# Patient Record
Sex: MALE | Race: WHITE | NOT HISPANIC OR LATINO | Employment: OTHER | URBAN - METROPOLITAN AREA
[De-identification: names, ages, dates, MRNs, and addresses within clinical notes are randomized per-mention and may not be internally consistent; named-entity substitution may affect disease eponyms.]

---

## 2019-12-19 ENCOUNTER — TRANSFERRED RECORDS (OUTPATIENT)
Dept: HEALTH INFORMATION MANAGEMENT | Facility: CLINIC | Age: 69
End: 2019-12-19

## 2021-03-08 ENCOUNTER — TRANSFERRED RECORDS (OUTPATIENT)
Dept: HEALTH INFORMATION MANAGEMENT | Facility: CLINIC | Age: 71
End: 2021-03-08

## 2021-03-09 ENCOUNTER — TRANSFERRED RECORDS (OUTPATIENT)
Dept: HEALTH INFORMATION MANAGEMENT | Facility: CLINIC | Age: 71
End: 2021-03-09

## 2021-03-10 ENCOUNTER — REFERRAL (OUTPATIENT)
Dept: TRANSPLANT | Facility: CLINIC | Age: 71
End: 2021-03-10

## 2021-03-10 DIAGNOSIS — N18.6 ESRD (END STAGE RENAL DISEASE) (H): Primary | ICD-10-CM

## 2021-03-10 DIAGNOSIS — N05.9 GLOMERULONEPHRITIS: ICD-10-CM

## 2021-03-10 NOTE — LETTER
March 23, 2021      Cesar Rashid  [address] [place] [address]        Dear Cesar,    Thank you for your interest in the Transplant Center at [facility]. We look forward to being a part of your care team and assisting you through the transplant process.    As we discussed, your transplant coordinator is Brenda Hernandez (Kidney).  You may call your coordinator at any time with questions or concerns.  Your first scheduled call will be on March 29, 2021.  If this needs to change, call [phone].    Please complete the following.    1. Fill out and return the enclosed forms    Authorization for Electronic Communication    Authorization to Discuss Protected Health Information    Authorization for Release of Protected Health Information    Authorization for Care Everywhere Release of Information    2. Sign up for:    [employer], access to your electronic medical record (see enclosed pamphlet)    [employer], a transplant education website    You can use these tools to learn more about your transplant, communicate with your care team, and track your medical details    Sincerely,    Solid Organ Transplant  [facility]  Cc: Loreta Valdivia MD (Referring)

## 2021-03-10 NOTE — LETTER
Cesar Rashid  [address] [facility] [address]                March 11, 2021                                                                                        MEDICAL RECORDS REQUEST    [facility] Kidney, Kidney Pancreas Transplant Program Records Request                      Facility: [facility]    Thank you for referring your patient to the [facility] Kidney, Kidney Pancreas Program, in order to process the referral we will need the following information;    1. Demographics  2. 2728 form   3. Immunizations records  4. Providers Progress notes, (last 3 note)      Please call our office at [phone] if you have any questions or concerns.                Please fax all paper records to [phone] within 1-3 business days.      Thank you,   The SOT Referral Intake Team     [facility]  Solid Organ Transplant Office  [address] [place] [address], [address] [address]

## 2021-03-10 NOTE — LETTER
Cesar Rashid  [address] [facility] [address]                March 23, 2021       Date:     To:      Fax:     Re: Misdirected Fax     Greetings,     Our intake team has received a fax regarding *** from your facility. To better assist you, please resend fax to the appropriate facility/department.   This is a referral fax number for new adult Solid Organ Transplant patients only.      If patient is needing transplant services, send a referral for transplant, and we will reach out to the patient in 1-2 business days.     Please call our office at [phone] if you have any questions or concerns.      Sincerely,     [facility]  Solid Organ Transplant Office  [facility], [address] [address]

## 2021-03-11 VITALS — WEIGHT: 159 LBS | HEIGHT: 68 IN | BODY MASS INDEX: 24.1 KG/M2

## 2021-03-11 SDOH — HEALTH STABILITY: MENTAL HEALTH: HOW OFTEN DO YOU HAVE 6 OR MORE DRINKS ON ONE OCCASION?: NOT ASKED

## 2021-03-11 SDOH — HEALTH STABILITY: MENTAL HEALTH: HOW MANY STANDARD DRINKS CONTAINING ALCOHOL DO YOU HAVE ON A TYPICAL DAY?: NOT ASKED

## 2021-03-11 SDOH — HEALTH STABILITY: MENTAL HEALTH: HOW OFTEN DO YOU HAVE A DRINK CONTAINING ALCOHOL?: NOT ASKED

## 2021-03-11 ASSESSMENT — MIFFLIN-ST. JEOR: SCORE: 1460.72

## 2021-03-11 NOTE — TELEPHONE ENCOUNTER
PCP: Olga Lazcano NP   Referring Provider: Loreta Valdivia MD  Referring Diagnosis: ESRD  Glomeruonephritis    Is patient under the age of 65? N  Is patient diabetic?   Is patient on insulin?   Was patient offered a pancreas transplant referral? N    Is patient in a group home/assisted living? N  Does patient have a guardian? N    Referral intake process completed.  Patient is aware that after financial approval is received, medical records will be requested.   Patient confirmed for a callback from transplant coordinator on March 29, 2021. (within 2 weeks)  Tentative evaluation date April 14, 2021. (within 4 weeks)    Confirmed coordinator will discuss evaluation process in more detail at the time of their call.   Patient is aware of the need to arrange age appropriate cancer screening, vaccinations, and dental care.  Reminded patient to complete questionnaire, complete medical records release, and review packet prior to evaluation visit .  Assessed patient for special needs (ie--wheelchair, assistance, guardian, and ):  none   Patient instructed to call [phone] with questions.     Patient gave verbal consent during intake call to obtain medical records and documents outside of [facility]:  yes

## 2021-03-31 ENCOUNTER — COMMITTEE REVIEW (OUTPATIENT)
Dept: TRANSPLANT | Facility: CLINIC | Age: 71
End: 2021-03-31

## 2021-03-31 NOTE — COMMITTEE REVIEW
Abdominal Patient Discussion Note Transplant Coordinator: Brenda Hernandez  Transplant Surgeon:       Referring Physician: Loreta Valdivia    Committee Review Members:  Nephrology Elgin Perez MD, Tracee Nixon MD, Sarbjit Irvin MD   Nurse Franchesca Owusu RN   Pharmacy Edgardo Herron [facility]    - Clinical Mireyadebby Angel, MSW   Transplant Radha Neal RN, Brenda Hernandez, RN, Roshni Valadez, RN, Katy Goldsmith RN, Tamera Salguero MD, Giulai Garcia, BIJU, Susie Romero RN   Transplant Surgery Ashutosh Calero MD, Tamera Salguero MD       Additional Discussion Notes and Findings: Reviewed pt's recent diagnosis of low-grade noninvasive urothelial carcinoma and treatment plan.  Pt was diagnosed on 2/27/20 and has been following w/ urology since.  He has been undergoing cystoscopies q3 months.  Dr. Salguero felt it appropriate to begin his pre-kidney transplant evaluation at this time as he is already one year out from his initial diagnosis.  Will contact pt w/ outcome.

## 2021-04-01 NOTE — TELEPHONE ENCOUNTER
Called pt and let him know we reviewed his bladder cancer history at [facility] on 3/31/21 and the team thought it would be appropriate for him to proceed w/ transplant evaluation. He is not sure if he is totally on board w/ transplant, he states he feels really good and his labs continue to show improvements in his kidney function.  He told me he is having more labs done early next week and he wants to think it about it more and he will call me back next week.  He has my direct line and had no further questions for me.

## 2021-04-03 ENCOUNTER — HEALTH MAINTENANCE LETTER (OUTPATIENT)
Age: 71
End: 2021-04-03

## 2021-04-12 ENCOUNTER — TELEPHONE (OUTPATIENT)
Dept: TRANSPLANT | Facility: CLINIC | Age: 71
End: 2021-04-12

## 2021-04-12 NOTE — TELEPHONE ENCOUNTER
Called pt to let him know we have a tentative evaluation date of 4/14/21, he confirmed he would like to cancel as he still wants to think more about pursuing transplant.  He has my direct line and will call me when he has a decision about whether or not he wishes to proceed w/ eval. Will let scheduling know.

## 2021-09-09 ENCOUNTER — TELEPHONE (OUTPATIENT)
Dept: TRANSPLANT | Facility: CLINIC | Age: 71
End: 2021-09-09

## 2021-09-09 NOTE — TELEPHONE ENCOUNTER
Left pt a VM stating that we have not heard from him since April and since his insurance auth for eval is now expiring we will be closing his referral.  Stated he may be referred again if the future by his nephrologist if he wishes to be evaluated.  Provided my direct line for follow up.

## 2021-09-18 ENCOUNTER — HEALTH MAINTENANCE LETTER (OUTPATIENT)
Age: 71
End: 2021-09-18

## 2021-12-29 ENCOUNTER — TELEPHONE (OUTPATIENT)
Dept: TRANSPLANT | Facility: CLINIC | Age: 71
End: 2021-12-29
Payer: COMMERCIAL

## 2021-12-29 NOTE — TELEPHONE ENCOUNTER
Pt called and told me he was unsure about pursuing transplant when he was initially referred back in March 2021, however, he has changed his mind and would like to proceed w/ evaluation for kidney transplant.  I explained to him that we will have intake reach out and get insurance approval per our normal process and that once those two pieces are completed, he will hear from me and will have an evaluation arranged. He had no further questions and was in good agreement w/ the plan. Will send chart to intake.

## 2021-12-30 ENCOUNTER — REFERRAL (OUTPATIENT)
Dept: TRANSPLANT | Facility: CLINIC | Age: 71
End: 2021-12-30
Payer: COMMERCIAL

## 2021-12-30 DIAGNOSIS — Z76.82 ORGAN TRANSPLANT CANDIDATE: ICD-10-CM

## 2021-12-30 DIAGNOSIS — N18.6 ESRD (END STAGE RENAL DISEASE) (H): Primary | ICD-10-CM

## 2021-12-30 DIAGNOSIS — Z01.818 PRE-TRANSPLANT EVALUATION FOR KIDNEY TRANSPLANT: ICD-10-CM

## 2021-12-30 DIAGNOSIS — I10 ESSENTIAL HYPERTENSION: ICD-10-CM

## 2021-12-30 DIAGNOSIS — N18.6 END STAGE RENAL DISEASE (H): ICD-10-CM

## 2021-12-30 NOTE — LETTER
January 3, 2022                                                                       MEDICAL RECORDS REQUEST    [facility] Kidney, Kidney Pancreas Transplant Program Records Request                      Facility: [facility]   Rob Rashid   1950    Thank you for referring your patient to the [facility] Kidney, Kidney Pancreas Program, in order to process the referral we will need the following information;    1. Immunizations records  2. Providers Progress notes, (last 3 note)      Please call our office at [phone] if you have any questions or concerns.                Please fax all paper records to [phone] within 3-5 business days.      Thank you,   The SOT Referral Intake Team     [facility]  Solid Organ Transplant Office  [address] [place] [address], [address] [address]

## 2021-12-30 NOTE — LETTER
January 31, 2022    Cesar Rashid  [address] [place] [address]    Dear Cesar,    Thank you for your interest in the Transplant Center at [facility]. We look forward to being a part of your care team and assisting you through the transplant process.    As we discussed, your transplant coordinator is Brenda Hernandez (Kidney).  You may call your coordinator at any time with questions or concerns.  [phone].    Please complete the following.    1. Fill out and return the enclosed forms    Authorization for Electronic Communication    Authorization to Discuss Protected Health Information    Authorization for Release of Protected Health Information    Authorization for Care Everywhere Release of Information    2. Sign up for:    [employer], access to your electronic medical record (see enclosed pamphlet)    [email or web address], a transplant education website    You can use these tools to learn more about your transplant, communicate with your care team, and track your medical details      Sincerely,      Solid Organ Transplant  [facility]    cc: Referring Physician PCP

## 2022-01-03 VITALS — HEIGHT: 68 IN | WEIGHT: 163 LBS | BODY MASS INDEX: 24.71 KG/M2

## 2022-01-03 ASSESSMENT — MIFFLIN-ST. JEOR: SCORE: 1473.86

## 2022-01-03 NOTE — TELEPHONE ENCOUNTER
PCP: Olga Lazcano NP  Referring Provider: Loreta Valdivia MD  Referring Diagnosis: ESRD    Is patient under the age of 65? No  Is patient diabetic? no  Is patient on insulin?   Was patient offered a pancreas transplant referral? no    Is patient in a group home/assisted living? no  Does patient have a guardian? no    Referral intake process completed.  Patient is aware that after financial approval is received, medical records will be requested.   Patient confirmed for a callback from transplant coordinator on Jan. 18, 2022. (within 2 weeks)  Tentative evaluation date Feb. 2, 2022. (within 4 weeks)    Confirmed coordinator will discuss evaluation process in more detail at the time of their call.   Patient is aware of the need to arrange age appropriate cancer screening, vaccinations, and dental care.  Reminded patient to complete questionnaire, complete medical records release, and review packet prior to evaluation visit .  Assessed patient for special needs (ie-wheelchair, assistance, guardian, and ):  none   Patient instructed to call [phone] with questions.     Patient gave verbal consent during intake call to obtain medical records and documents outside of [facility]:  yes

## 2022-01-18 NOTE — TELEPHONE ENCOUNTER
Contacted patient and introduced myself as their Transplant Coordinator, also introduced the role of the Transplant Coordinator in the transplant process.  Explained the purpose of this call including reviewing next steps and answering questions.    Confirmed Referring Provider, Dialysis Center, and Primary Care Physician. Notified patient of the importance of continued communication with referring providers and primary care physicians.    Reviewed components of transplant evaluation process including necessary appointments, tests, and procedures.    Answered questions for patient regarding evaluation, provided my name and contact information and requested they call with any additional questions.    Determined that patient would like additional information regarding transplant by:     Drop Down choices: Mail, Email, MyChart, Phone Call   Encourage MyChart   Notified patients that they will hear from a Transplant  to schedule evaluation.     Reviewed pt's chart for pre-kidney transplant evaluation planning. Pt lives in [place], [place]. Pt has ESKD d/t autoimmune glomerulonephritis, on PD and follows w/ Dr. Loreta Valdivia. Other hx includes anemia, HTN, hypothyroid, GERD, chronic frontal sinusitis (working w/ local ENT), and urothelial carcinoma (follows w/ local urologist- due for follow up).  Heart hx includes angina.  No significant lung issues. BMI 23.  Colonoscopy: not UTD.  Dental: not UTD.  Pt is not a smoker, and denies alcohol and recreational drug abuse. Pt is independent w/ ADLs.  Pt lives w/ wife.  Pt has been vaccinated x3 against COVID-19.     I also introduced [employer] and asked pt to create an account and view pre-kidney transplant videos for review with me following evaluation. Will cancel STD since [place] out of state and will need to be seen in person. Informed pt they will hear from scheduling to arrange the evaluation. Smartset orders entered, chart routed to scheduling pool.

## 2022-02-27 ENCOUNTER — TRANSFERRED RECORDS (OUTPATIENT)
Dept: HEALTH INFORMATION MANAGEMENT | Facility: CLINIC | Age: 72
End: 2022-02-27
Payer: COMMERCIAL

## 2022-03-14 ENCOUNTER — TELEPHONE (OUTPATIENT)
Dept: TRANSPLANT | Facility: CLINIC | Age: 72
End: 2022-03-14
Payer: COMMERCIAL

## 2022-03-14 NOTE — TELEPHONE ENCOUNTER
Received call from pt, he stated that he is no longer interested in being evaluated for kidney transplant.  He has reviewed the pt education and feels that at his age and based on the fact that he is tolerating dialysis well, he does not wish to take on the risks of transplant.  I did mention that he could keep his PKE slot if he wanted to meet with the transplant providers and discuss further with them, he declined, but we did review that if he has a change of heart in the future he is always welcome to be referred back.  He states he feels well overall and since dialysis is going well he wants to maintain his current quality of life.  Will end referral episode and notify scheduling to cancel his PKE STD.

## 2022-04-30 ENCOUNTER — HEALTH MAINTENANCE LETTER (OUTPATIENT)
Age: 72
End: 2022-04-30

## 2022-11-19 ENCOUNTER — HEALTH MAINTENANCE LETTER (OUTPATIENT)
Age: 72
End: 2022-11-19

## 2023-03-24 ENCOUNTER — TRANSFERRED RECORDS (OUTPATIENT)
Dept: HEALTH INFORMATION MANAGEMENT | Facility: CLINIC | Age: 73
End: 2023-03-24

## 2023-06-01 ENCOUNTER — HEALTH MAINTENANCE LETTER (OUTPATIENT)
Age: 73
End: 2023-06-01

## 2024-02-06 ENCOUNTER — TRANSFERRED RECORDS (OUTPATIENT)
Dept: HEALTH INFORMATION MANAGEMENT | Facility: CLINIC | Age: 74
End: 2024-02-06

## 2024-04-02 ENCOUNTER — TRANSFERRED RECORDS (OUTPATIENT)
Dept: HEALTH INFORMATION MANAGEMENT | Facility: CLINIC | Age: 74
End: 2024-04-02

## 2024-04-05 ENCOUNTER — TRANSFERRED RECORDS (OUTPATIENT)
Dept: HEALTH INFORMATION MANAGEMENT | Facility: CLINIC | Age: 74
End: 2024-04-05

## 2024-05-07 ENCOUNTER — REFERRAL (OUTPATIENT)
Dept: TRANSPLANT | Facility: CLINIC | Age: 74
End: 2024-05-07
Payer: COMMERCIAL

## 2024-05-07 DIAGNOSIS — C67.2 MALIGNANT NEOPLASM OF LATERAL WALL OF URINARY BLADDER (H): ICD-10-CM

## 2024-05-07 DIAGNOSIS — D63.1 ANEMIA DUE TO CHRONIC KIDNEY DISEASE: ICD-10-CM

## 2024-05-07 DIAGNOSIS — N18.6 ESRD ON PERITONEAL DIALYSIS (H): Primary | ICD-10-CM

## 2024-05-07 DIAGNOSIS — Z01.818 PRE-TRANSPLANT EVALUATION FOR KIDNEY TRANSPLANT: ICD-10-CM

## 2024-05-07 DIAGNOSIS — Z99.2 ESRD ON PERITONEAL DIALYSIS (H): Primary | ICD-10-CM

## 2024-05-07 DIAGNOSIS — N18.9 ANEMIA DUE TO CHRONIC KIDNEY DISEASE: ICD-10-CM

## 2024-05-07 DIAGNOSIS — Z96.641 STATUS POST RIGHT HIP REPLACEMENT: ICD-10-CM

## 2024-05-07 DIAGNOSIS — N18.6 ESRD (END STAGE RENAL DISEASE) (H): ICD-10-CM

## 2024-05-07 DIAGNOSIS — Z86.16 HISTORY OF COVID-19: ICD-10-CM

## 2024-05-07 NOTE — LETTER
Cesar Rashid  [address] [place] [id number]          Dear Cesar,    Thank you for your interest in the Transplant Center at [facility]. We look forward to being a part of your care team and assisting you through the transplant process.    As we discussed, your transplant coordinator is Nalini Vargas (Kidney).  You may call your coordinator at any time with questions or concerns.  Your first scheduled call will be on 5/13/2024 between 1-4pm.  If this needs to change, call [phone].    Please complete the following.    Fill out and return the enclosed forms  Authorization for Electronic Communication  Authorization to Discuss Protected Health Information  Authorization for Release of Protected Health Information    Sign up for:  [employer], access to your electronic medical record (see enclosed pamphlet)  [employer], a transplant education website    You can use these tools to learn more about your transplant, communicate with your care team, and track your medical details      Sincerely,      Solid Organ Transplant  [facility]    cc: Referring Physician Dialysis Unit PCP

## 2024-05-07 NOTE — LETTER
Cesar Schuler Rashid  [address] [facility] [address]                May 10, 2024                                                                                        MEDICAL RECORDS REQUEST    [facility] Kidney, Kidney Pancreas Transplant Program Records Request                      Facility: [facility] ([facility])    Thank you for referring your patient to the [facility] Kidney, Kidney Pancreas Program, in order to process the referral we will need the following information;    Demographics  2728 form                 Please call our office at [phone] if you have any questions or concerns.                Please fax all paper records to [phone] within 3-5 business days.      Thank you,   The SOT Referral Intake Team     [facility]  Solid Organ Transplant Office  [address] [place] [address] [address]  [place] [address]

## 2024-05-09 ENCOUNTER — TELEPHONE (OUTPATIENT)
Dept: TRANSPLANT | Facility: CLINIC | Age: 74
End: 2024-05-09
Payer: COMMERCIAL

## 2024-05-09 NOTE — TELEPHONE ENCOUNTER
Patient Call: General  Route to LPN    Reason for call: wife and patient returning call back to intake team.     Call back needed? Yes    Return Call Needed  Same as documented in contacts section  When to return call?: Same day: Route High Priority

## 2024-05-10 VITALS — BODY MASS INDEX: 25.01 KG/M2 | WEIGHT: 165 LBS | HEIGHT: 68 IN

## 2024-05-10 PROBLEM — N18.6 ESRD ON PERITONEAL DIALYSIS (H): Status: ACTIVE | Noted: 2021-06-01

## 2024-05-10 PROBLEM — Z99.2 ESRD ON PERITONEAL DIALYSIS (H): Status: ACTIVE | Noted: 2021-06-01

## 2024-05-10 NOTE — TELEPHONE ENCOUNTER
SOT KIDNEY INTAKE   May 10, 2024 3:58 PM - Jessica Schmitz LPN:     PCP: Steffi Hatch    Referring Organization: [facility]  Referring Provider: Loreta Valdivia MD   Referring Diagnosis: ESRD on PD    GFR/Date: On dialysis     Is patient under the age of 65? No   Is patient diabetic? No  Is patient on insulin? No  Was patient offered a pancreas transplant referral? No    Previous transplants: No  Cancer history: Bladder mass 12/31/2019  Cardiac history: VF, Ischemic cardiomyopathy, STEMI , ICD 9/06/2023  Biopsy: Left renal biopsy 1/2020  Oxygen use at rest: 0 with activity: 0    BMI: 25.09 (BMI> 40 - RNCC call only/ no PKE date)    Is patient in a group home/assisted living? No  Does patient have a guardian? No    Referral intake process completed.  Patient is aware that after financial approval is received, medical records will be requested.   Patient confirmed for a callback from transplant coordinator on 5/13/2024. (within 2 weeks)  Tentative evaluation date 7/29/2024 slot 5 (within 4 weeks) if appointment is virtual, does patient have capabilities of setting this up? No    Confirmed coordinator will discuss evaluation process in more detail at the time of their call.   Patient is aware of the need to arrange age appropriate cancer screening, vaccinations, and dental care.  Reminded patient to complete questionnaire, complete medical records release, and review packet prior to evaluation visit .  Assessed patient for special needs (ie-wheelchair, assistance, guardian, and ):  None    Patient instructed to call [phone] with questions.     Patient gave verbal consent during intake call to obtain medical records and documents outside of [facility]:  Yes

## 2024-05-13 PROBLEM — N17.9 ACUTE RENAL FAILURE (H): Status: ACTIVE | Noted: 2019-12-20

## 2024-05-13 PROBLEM — I25.5 ISCHEMIC CARDIOMYOPATHY: Status: ACTIVE | Noted: 2023-09-06

## 2024-05-13 PROBLEM — E78.5 DYSLIPIDEMIA: Status: ACTIVE | Noted: 2023-09-07

## 2024-05-13 PROBLEM — Z95.5 STENTED CORONARY ARTERY: Status: ACTIVE | Noted: 2023-09-06

## 2024-05-13 PROBLEM — Z86.16 HISTORY OF COVID-19: Status: ACTIVE | Noted: 2022-12-16

## 2024-05-13 PROBLEM — I21.19 ACUTE ST ELEVATION MYOCARDIAL INFARCTION (STEMI) OF INFERIOR WALL (H): Status: ACTIVE | Noted: 2023-09-06

## 2024-05-13 PROBLEM — I25.10 ATHEROSCLEROSIS OF CORONARY ARTERY: Status: ACTIVE | Noted: 2023-10-10

## 2024-05-13 PROBLEM — D63.1 ANEMIA DUE TO CHRONIC KIDNEY DISEASE: Status: ACTIVE | Noted: 2020-07-19

## 2024-05-13 PROBLEM — C67.2 MALIGNANT NEOPLASM OF LATERAL WALL OF URINARY BLADDER (H): Status: ACTIVE | Noted: 2020-02-27

## 2024-05-13 PROBLEM — I49.01 VF (VENTRICULAR FIBRILLATION) (H): Status: ACTIVE | Noted: 2023-09-06

## 2024-05-13 PROBLEM — N18.9 ANEMIA DUE TO CHRONIC KIDNEY DISEASE: Status: ACTIVE | Noted: 2020-07-19

## 2024-05-13 PROBLEM — N32.89 BLADDER MASS: Status: ACTIVE | Noted: 2019-12-31

## 2024-05-13 PROBLEM — R91.1 PULMONARY NODULE: Status: ACTIVE | Noted: 2019-12-20

## 2024-05-13 PROBLEM — R00.1 BRADYCARDIA, SINUS: Status: ACTIVE | Noted: 2023-09-08

## 2024-05-13 PROBLEM — N18.6 ESRD (END STAGE RENAL DISEASE) (H): Status: ACTIVE | Noted: 2021-06-01

## 2024-05-13 RX ORDER — CARVEDILOL 12.5 MG/1
12.5 TABLET ORAL 2 TIMES DAILY WITH MEALS
COMMUNITY

## 2024-05-13 RX ORDER — ASPIRIN 81 MG/1
81 TABLET, CHEWABLE ORAL DAILY
COMMUNITY
Start: 2023-09-12

## 2024-05-13 RX ORDER — LISINOPRIL 20 MG/1
20 TABLET ORAL DAILY
COMMUNITY
Start: 2023-09-12

## 2024-05-13 RX ORDER — CARVEDILOL 6.25 MG/1
6.25 TABLET ORAL 2 TIMES DAILY WITH MEALS
COMMUNITY
Start: 2024-01-03 | End: 2025-01-02

## 2024-05-13 RX ORDER — LEVOTHYROXINE SODIUM 25 UG/1
1 TABLET ORAL DAILY
COMMUNITY
Start: 2023-11-24

## 2024-05-13 RX ORDER — LEVOTHYROXINE SODIUM 200 UG/1
1 TABLET ORAL DAILY
COMMUNITY
Start: 2023-04-04

## 2024-05-13 NOTE — TELEPHONE ENCOUNTER
Reviewed patients chart for pre-Kidney transplant evaluation planning.      services is required NO    Is pt able to attend virtual education class? YES: 7/22/24      Pt has bladder ca in 2020, biopsy unknown.  Pt is on dialysis, PD started on 7/22/20. Pt is not diabetic, not insulin.     Health hx: Low grade bladder cancer 3/2020, last cytology  Heart hx: heart attack with stent.  Lung hx: n/a  Surgical hx: hips, eye, chest port, PD cath    Pt has never smoked, does not consume alcohol, and does not do recreational drugs. Does not have current infection, does not have non-healing wounds, or active cancer.    Health maintenance items: BMI 25 on 5/10/24.  Colonoscopy: pending.  Dental: pending. Vaccinations: pending. Pt is independent w/ ADLs.  Pt lives in [place] w/ spouse.  Has support following transplant. Pt does not have living donors at this time.     Imaging Available: none, need CT iliacs after eval    Contacted patient and introduced myself as their Transplant Coordinator, also introduced the role of the Transplant Coordinator in the transplant process.  Explained the purpose of this call including reviewing next steps and answering questions.      Confirmed Referring Provider; Dialysis Center, and Primary Care Physician. Explained to the patient of the importance of continued communication with referring providers and primary care physicians.      Reviewed components of transplant evaluation process including necessary appointments, tests, and procedures.  Instructed pt to bring 1-2 people with them to eval and to eat and drink and normally on eval day    Answered questions for patient regarding evaluation, provided my name and contact information and requested they call/message with any additional questions or concerns.  Informed patient they will receive a letter with information discussed in referral call. Determined that patient would like information regarding transplant by:        Lynette Rivas signed up for education class prior to evaluation. Pt expressed good understanding of all and were in  agreement with the plan.    Confirmed STD 7/29/24. Smartset orders entered.

## 2024-06-16 ENCOUNTER — HEALTH MAINTENANCE LETTER (OUTPATIENT)
Age: 74
End: 2024-06-16

## 2024-06-28 ENCOUNTER — TELEPHONE (OUTPATIENT)
Dept: TRANSPLANT | Facility: CLINIC | Age: 74
End: 2024-06-28
Payer: COMMERCIAL

## 2024-06-28 NOTE — TELEPHONE ENCOUNTER
Spoke with Rafaela to let her know we are resending the welcome packet with enclosed forms for patient to sign. She had no further questions and will sign and return forms.

## 2024-07-22 ENCOUNTER — VIRTUAL VISIT (OUTPATIENT)
Dept: TRANSPLANT | Facility: CLINIC | Age: 74
End: 2024-07-22
Attending: NURSE PRACTITIONER
Payer: COMMERCIAL

## 2024-07-22 ENCOUNTER — TELEPHONE (OUTPATIENT)
Dept: TRANSPLANT | Facility: CLINIC | Age: 74
End: 2024-07-22
Payer: COMMERCIAL

## 2024-07-22 DIAGNOSIS — Z99.2 ESRD ON PERITONEAL DIALYSIS (H): ICD-10-CM

## 2024-07-22 DIAGNOSIS — Z01.818 PRE-TRANSPLANT EVALUATION FOR KIDNEY TRANSPLANT: ICD-10-CM

## 2024-07-22 DIAGNOSIS — N18.6 ESRD ON PERITONEAL DIALYSIS (H): ICD-10-CM

## 2024-07-22 NOTE — TELEPHONE ENCOUNTER
Spoke with patient and his wife to reschedule the virtual transplant class as they had difficulties logging on this morning. Rescheduled to 0930 on 7/25/24. Patient will log on a few minutes before 0930.

## 2024-07-22 NOTE — TELEPHONE ENCOUNTER
Pt's wife called stated they missed the Transplant class this morning and wonder what to do next or how to get another one scheduled?

## 2024-07-25 ENCOUNTER — VIRTUAL VISIT (OUTPATIENT)
Dept: TRANSPLANT | Facility: CLINIC | Age: 74
End: 2024-07-25
Payer: COMMERCIAL

## 2024-07-25 DIAGNOSIS — Z76.82 ORGAN TRANSPLANT CANDIDATE: Primary | ICD-10-CM

## 2024-07-25 NOTE — GROUP NOTE
Date: 7/25/2024    Scheduled Start Time:  9:30 AM   Scheduled End Time: 10:30 AM    Department: [facility] Transplant Clinic    Facilitator(s): Kim Mahoney RN    Documentation:  Solid Organ Transplant Education      Patient attended the pre-transplant patient education class today. The [facility] website pre-transplant modules were viewed:     Content reviewed:  Living Donation and how to access that program  Paired exchange  Kidney Donor Profile Index (KDPI)  Waiting list issues (right to decline without penalty, high PHS risk donors, what to expect when called with an offer)  Hospital experience, length of stay, need to stay locally post-discharge (2-4 weeks)    Surgical complications with video                      Post-surgery lifting and driving restrictions  Post-transplant routines, frequency of lab work and clinic visits  Role of Transplant Coordinator    Participants were informed of the benefits of transplant as well as potential risks such as infection, cancer, and death. The need for total adherence with immunosuppression medications and following transplant regimens was stressed.  The overall evaluation/approval/listing process was reviewed.          Patient:   Cesar Rashid    Transplant Episode(s):  Kidney Transplant Referral - 5/7/2024    Patient attended class with his wife and was very engaged and asked good questions.

## 2024-07-25 NOTE — PROGRESS NOTES
[facility] SOLID ORGAN TRANSPLANT  OUTPATIENT MNT: KIDNEY TRANSPLANT EVALUATION    Current BMI: 24.7 (HT 69.5 in,  lbs/77 kg)  BMI guideline for kidney transplant up to a BMI of 40 / per surgeon discretion     Frailty Assessment-- Not Frail (2/5 points)- low activity, low   Handgrip Strength: 16    Reference:  Score of 0-2 = Not Frail  Score of 3-5 = Frail     *Pt would need to stop several supplements (see below) at time of transplant      TIME SPENT: 15 minutes  VISIT TYPE: Initial   REFERRING PHYSICIAN: Lester  PT ACCOMPANIED BY: his wife     History of previous txp: none   Dialysis: PD 7/2020    NUTRITION ASSESSMENT  - Meal prep & grocery shopping: pt's wife   - Previous RD education: yes  - Appetite: variable, slightly better over the years  - Food allergies/intolerances: no  - Issues chewing or swallowing: no  - N/V/D/C: no  - Food access concerns: not asked     *Has been taking the following supplements for several years  Vitamins, Supplements, Pertinent Meds: omega 3, CoQ10, renagel (takes most of the time)     Herbal Medicines/Supplements: all of the below would need to be stopped at time of transplant  - [employer] Cardiogenics Intensive Care  - Arterosil Endothelial Glycocalyx Support  - NutriDyn Chondro Relief  - [facility] Liver Care  - NutriDyn Essential Multivitamin- contains many herbals     Protein Supplements: protein drink from [employer]- 1/2 per day with meds     Weight hx // fluid retention:   - no MIKE  - weight stable     PHYSICAL ACTIVITY   Works around the house, [employer], cuts wood   Energy level OK, can walk 1 block      DIET RECALL  Breakfast Late AM- pastry with coffee or none due to lack of appetite    Lunch Hamburger, brat   Dinner Taco salad; brat & beans; carrot juice    Snacks Occasional potato chips   Beverages Water, some coffee, occasional iced tea   Alcohol Seldom- if out for pizza 2x/year    Dining out A few times/week      LABS  Per pt, K/Phos slightly high  the past few months, no recent results on file      NUTRITION DIAGNOSIS   No nutrition diagnosis identified at this time     NUTRITION INTERVENTION   Nutrition education provided:  Discussed sodium intake (low sodium foods and drinks, seasoning food without salt and tips for low sodium diet).  Reviewed K/Phos, protein needs with being on dialysis.     Reviewed post txp diet guidelines in brief (will review in further detail post txp):  (1) Review of proper food safety measures d/t immunosuppressant therapy post-op and increased risk for food-borne illness    (2) Avoid the following post txp d/t risk for rejection, unknown effects on the organs, and/or potential interactions with immunosuppressants:  - Herbal, Chinese, holistic, chiropractic, natural, alternative medicines and supplements  - Detoxes and cleanses  - Weight loss pills  - Protein powders or other products with extracts or herbs (ie green tea extract)    (3) Med regimen and possible side effects    Patient Understanding: Pt verbalized understanding of education provided.  Expected Engagement: Good  Follow-Up Plans: PRN     NUTRITION GOALS   No nutrition goals identified at this time     Zoila Thomas, RD, LD, CCTD

## 2024-07-27 NOTE — PROGRESS NOTES
TRANSPLANT NEPHROLOGY RECIPIENT EVALUATION NOTE    Recommendations:  - Visit with Dr. Stoddard for input on ANCA disease quiescence.   - cardiac risk assessment   - CT a/p/c and iliacs  - Hindu: unknown to this writer at time of evaluation. Will need to confirm willingness for blood transfusions/ cell saver.   -urology/ updated cysto  - colonoscopy   - repeat lupus anticoagulant with SPEP/ UPEP and beta-2 glycoproteins,  per lab recs.     Assessment and Plan:  # Kidney Transplant Evaluation: Patient is a fair candidate overall. Benefits of a living donor transplant were discussed.    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 Refused a kidney biopsy. HD initiation 7/17/20 since changed to PD which is going well. May benefit from a kidney transplant. Repeat MPO titer today decreased/stable ~ 58. His course has also been  c/b  chronic frontal sinusitis. Recommend evaluation with Dr. Stoddard for input on disease quiescence.     # Cardiac Risk: H/o STEMI and VF cardiac arrest 9/6/23 s/p shockwave lithotripsy and SURESH to RCA and SURESH x1 to RPLA, ischemic cardiomyopathy with LVEF of 45%, on amiodarone for VT. During his hospital stay he had an in-hospital VT arrest. Repeat coronary angio was completed that showed no evidence of In Stent restenosis. S/p ICD placement. Follow-up echo 11/17/23 showed LVEF of 56% with mild hypokinesis of mid lateral wall, mild MR. Would need risk assessment,     # PAD Screening: scattered disease on 2019 CT. Refer to surgery for updated imaging.     # Chronic frontal sinusitis: followed by ENT, managing with sinus rinses and appears stable with last infection in 12/2023.     # Hindu: unknown to this writer at time of evaluation. Will need to confirm willingness for blood transfusions/ cell saver.     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     Path showed:   A.  Bladder, bladder tumor, transurethral resection:    Non-invasive low grade papillary urothelial carcinoma  Muscularis propria is present There was no evidence of muscle invasion.     Overdue for urology follow up, scheuduled for 8/8.     # Indeterminant 3 mm nodule right middle lobe:  most likely benign on 2019 CT along with a couple of tiny 3 mm subpleural nodules along the major fissures. Most likely benign. Recommend repeat CT.     # Indeterminate lupus anticoagulant: will repeat with SPEP/ UPEP and beta-2 glycoproteins per lab recs.     # Health Maintenance: Colonoscopy: Not up to date and Dental: should be UTD in the past 6-12 months.     - Discussed the risks and benefits of a transplant, including the risk of surgery and immunosuppression medications.  Patient's overall evaluation will be discussed in the Transplant Program's regular meeting with a final recommendation on the patients suitability for transplant to be made at that time.    Pending completion of the full evaluation, patient presently appears to be enough of an acceptable kidney transplant recipient candidate to have any potential kidney donors start the evaluation process.      Evaluation:  Cesar Rashid was seen in consultation at the request of Dr. Myranda Batista for evaluation as a potential kidney transplant recipient.    Reason for Visit:  Cesar Rashid is a 73 year old male with ESKD from ANCA vasculitis, who presents for kidney transplant evaluation.    History of Present Illness:           Kidney Disease Hx:         Presented in 12/2019 with LLUVIA (Cr ~7 mg/dl), microscopic hematuria (11-20 RBCs on UA) and a UPCR ~ 3.9 g/g. Serologies unremarkable for ISABEL -ve , C3/C4 N , SPEP , FLC  and Anti GBM, however, was positive for MPO ANCA  (level  ~ 70.5 CU) / PR3 negative. He refused a kidney biopsy / IS/Ritux at the time. He ultimately started hemodialysis on 7/17/2020. During that time he  was also diagnosed with bladder cancer s/p TURBT in 3/2020 and received BCG. His last cysto was negative in 8/2023. He has since changed to PD which is going well. His delay in coming in for a transplant evaluation was because he was unsure if he wanted a transplant.            Kidney Disease Dx: ANCA vasculitis       Biopsy Proven: No         On Dialysis: Yes, Date initiated: 7/2020 and Dialysis Type: Incenter HD; making a little urine,  BPs reportedly stable       Primary Nephrologist: Dr. Loreta Valdivia.        H/o Kidney Stones: Yes; 2 stones that he  passed on his own many years ago.        H/o Recurrent/Frequent UTI: No         Diabetic Hx: None           Cardiac Disease    H/o STEMI and VF cardiac arrest 9/6/23 s/p shockwave lithotripsy and SURESH to RCA and SURESH x1 to RPLA, ischemic cardiomyopathy with LVEF of 45%, on amiodarone for VT. During his hospital stay he had an in-hospital VT arrest. Repeat coronary angio was completed that showed no evidence of InStent restenosis. S/p ICD placement. Follow-up echo 11/17/23 showed LVEF of 56% with mild hypokinesis of mid lateral wall, mild MR.         Viral Serology Status       CMV IgG Antibody: Unknown       EBV IgG Antibody: Unknown         Volume Status/Weight:        Volume status: Euvolemic       Weight:  Acceptable BMI       BMI: There is no height or weight on file to calculate BMI.         Functional Capacity/Frailty:        Stays active maintaining his property,  mowing the lawn / cutting wood. Went to [place] last winter to vacation. Walks twice a day outside with his wife for 30 min, believes he could walk a mile. NO exertional or claudication symptoms.     Fatigue/Decreased Energy: [] No [x] Yes Sometimes tired   Chest Pain or SOB with Exertion: [x] No [] Yes    Significant Weight Change: [x] No [] Yes    Nausea, Vomiting or Diarrhea: [x] No [] Yes    Fever, Sweats or Chills:  [x] No [] Yes    Leg Swelling [x] No [] Yes        History of Cancer:   #  Urothelial carcinoma (3/2020): S/p TURBT and BCG, neg cysto last done in 8/2023    Path showed:   A.  Bladder, bladder tumor, transurethral resection:    Non-invasive low grade papillary urothelial carcinoma  Muscularis propria is present There was no evidence of muscle invasion.     Overdue for urology follow up, scheuduled  for 8/8.         Allergy Testing Questions:   Medication that caused a reaction  amlodipine -palpatations    Antibiotics used that didn't give an allergic reaction?  Patient doesn't know    COVID Vaccination Up To Date: Not asked    Potential Living Kidney Donors: Yes, maybe wife     Review of Systems:  A comprehensive review of systems was obtained and negative, except as noted in the HPI or PMH.    Past Medical History:   Medical record was reviewed and PMH was discussed with patient and noted below.  Past Medical History:   Diagnosis Date    Bladder cancer (H)        Past Social History:   Past Surgical History:   Procedure Laterality Date    repaired ruptured globe Right     TOTAL HIP ARTHROPLASTY Left 04/29/2014    TOTAL HIP ARTHROPLASTY Right 09/02/2014    VASCULAR SURGERY      chest port, peritoneal dialysis catheter     Personal history of bleeding or anesthesia problems: No    Family History:  Family History   Problem Relation Age of Onset    Colon Cancer Mother     Coronary Artery Disease Mother     Diabetes Mother     Coronary Artery Disease Father     Thyroid Disease Father     Alcoholism Brother     Diabetes Brother        Personal History:   Social History     Socioeconomic History    Marital status:      Spouse name: Not on file    Number of children: Not on file    Years of education: Not on file    Highest education level: Not on file   Occupational History    Not on file   Tobacco Use    Smoking status: Never    Smokeless tobacco: Never   Substance and Sexual Activity    Alcohol use: Not Currently     Comment: Very little - maybe 2 drinks a year    Drug use: Never     Sexual activity: Not on file   Other Topics Concern    Parent/sibling w/ CABG, MI or angioplasty before 65F 55M? No   Social History Narrative    Not on file     Social Determinants of Health     Financial Resource Strain: Not on file   Food Insecurity: No Food Insecurity (9/6/2023)    [facility]    Hunger Vital Sign     Worried About Running Out of Food in the Last Year: Never true     Ran Out of Food in the Last Year: Never true   Transportation Needs: Not on file   Physical Activity: Not on file   Stress: Not on file   Social Connections: Unknown (4/11/2023)    Received from [employer] [facility]    Social Connections     Frequency of Communication with Friends and Family: Not on file   Interpersonal Safety: Not on file   Housing Stability: Not on file       Allergies:  Allergies   Allergen Reactions    Levofloxacin Other (See Comments)    Omeprazole Nausea and Vomiting     Patient denied - no intolerance or allergy to this    Amlodipine Palpitations       Medications:  Current Outpatient Medications   Medication Sig Dispense Refill    aspirin (ASA) 81 MG chewable tablet Take 81 mg by mouth daily      carvedilol (COREG) 12.5 MG tablet Take 12.5 mg by mouth 2 times daily (with meals)      carvedilol (COREG) 6.25 MG tablet Take 6.25 mg by mouth 2 times daily (with meals)      levothyroxine (SYNTHROID/LEVOTHROID) 200 MCG tablet Take 1 tablet by mouth daily      levothyroxine (SYNTHROID/LEVOTHROID) 25 MCG tablet Take 1 tablet by mouth daily      lisinopril (ZESTRIL) 20 MG tablet Take 20 mg by mouth daily      Naltrexone HCl, Pain, 4.5 MG CAPS Take 4.5 mg by mouth daily       No current facility-administered medications for this visit.       Vitals:  There were no vitals taken for this visit.    Exam:  GENERAL APPEARANCE: alert and no distress  HENT: mouth without ulcers or lesions  RESP: lungs clear to auscultation - no rales, rhonchi or wheezes  CV: regular rhythm, normal  rate, no rub, no murmur  EDEMA: no LE edema bilaterally  ABDOMEN: soft, nondistended, nontender, bowel sounds normal  MS: extremities normal - no gross deformities noted, no evidence of inflammation in joints, no muscle tenderness  SKIN: no rash    Results:   No results found for this or any previous visit (from the past 336 hour(s)).    60 minutes spent on the date of the encounter doing chart review, history and exam, documentation and further activities per the note.

## 2024-07-28 LAB
A1 AG RBC QL: POSITIVE
ABO/RH(D): NORMAL
ABO/RH(D): NORMAL
ANTIBODY SCREEN: NEGATIVE
ANTIBODY TITER IGM SCREEN: NEGATIVE
B IGG TITR SERPL: 16 {TITER}
B IGM TITR SERPL: 8 {TITER}
SPECIMEN EXPIRATION DATE: NORMAL

## 2024-07-29 ENCOUNTER — ALLIED HEALTH/NURSE VISIT (OUTPATIENT)
Dept: TRANSPLANT | Facility: CLINIC | Age: 74
End: 2024-07-29
Attending: NURSE PRACTITIONER
Payer: COMMERCIAL

## 2024-07-29 ENCOUNTER — ANCILLARY PROCEDURE (OUTPATIENT)
Dept: CARDIOLOGY | Facility: CLINIC | Age: 74
End: 2024-07-29
Attending: NURSE PRACTITIONER
Payer: COMMERCIAL

## 2024-07-29 ENCOUNTER — LAB (OUTPATIENT)
Dept: LAB | Facility: CLINIC | Age: 74
End: 2024-07-29
Attending: NURSE PRACTITIONER
Payer: COMMERCIAL

## 2024-07-29 ENCOUNTER — DOCUMENTATION ONLY (OUTPATIENT)
Dept: TRANSPLANT | Facility: CLINIC | Age: 74
End: 2024-07-29

## 2024-07-29 ENCOUNTER — ANCILLARY PROCEDURE (OUTPATIENT)
Dept: GENERAL RADIOLOGY | Facility: CLINIC | Age: 74
End: 2024-07-29
Attending: NURSE PRACTITIONER
Payer: COMMERCIAL

## 2024-07-29 VITALS
BODY MASS INDEX: 24.35 KG/M2 | HEIGHT: 70 IN | WEIGHT: 170.1 LBS | DIASTOLIC BLOOD PRESSURE: 85 MMHG | OXYGEN SATURATION: 99 % | SYSTOLIC BLOOD PRESSURE: 151 MMHG | HEART RATE: 58 BPM

## 2024-07-29 DIAGNOSIS — Z76.82 ORGAN TRANSPLANT CANDIDATE: ICD-10-CM

## 2024-07-29 DIAGNOSIS — Z99.2 ESRD ON PERITONEAL DIALYSIS (H): ICD-10-CM

## 2024-07-29 DIAGNOSIS — N18.6 ESRD (END STAGE RENAL DISEASE) (H): ICD-10-CM

## 2024-07-29 DIAGNOSIS — Z76.82 ORGAN TRANSPLANT CANDIDATE: Primary | ICD-10-CM

## 2024-07-29 DIAGNOSIS — I77.82 ANCA-ASSOCIATED VASCULITIS (H): ICD-10-CM

## 2024-07-29 DIAGNOSIS — Z01.818 PRE-TRANSPLANT EVALUATION FOR KIDNEY TRANSPLANT: ICD-10-CM

## 2024-07-29 DIAGNOSIS — N18.6 ESRD ON PERITONEAL DIALYSIS (H): ICD-10-CM

## 2024-07-29 DIAGNOSIS — N18.6 ESRD ON PERITONEAL DIALYSIS (H): Primary | ICD-10-CM

## 2024-07-29 DIAGNOSIS — Z99.2 ESRD ON PERITONEAL DIALYSIS (H): Primary | ICD-10-CM

## 2024-07-29 DIAGNOSIS — N18.6 END STAGE RENAL DISEASE (H): ICD-10-CM

## 2024-07-29 DIAGNOSIS — Z12.5 ENCOUNTER FOR SCREENING FOR MALIGNANT NEOPLASM OF PROSTATE: ICD-10-CM

## 2024-07-29 DIAGNOSIS — Z01.818 PRE-TRANSPLANT EVALUATION FOR KIDNEY TRANSPLANT: Primary | ICD-10-CM

## 2024-07-29 DIAGNOSIS — N18.6 ESRD (END STAGE RENAL DISEASE) (H): Primary | ICD-10-CM

## 2024-07-29 LAB
ALBUMIN SERPL BCG-MCNC: 3.8 G/DL (ref 3.5–5.2)
ALP SERPL-CCNC: 82 U/L (ref 40–150)
ALT SERPL W P-5'-P-CCNC: 13 U/L (ref 0–70)
ANION GAP SERPL CALCULATED.3IONS-SCNC: 20 MMOL/L (ref 7–15)
APTT PPP: 29 SECONDS (ref 22–38)
AST SERPL W P-5'-P-CCNC: 13 U/L (ref 0–45)
BASOPHILS # BLD AUTO: 0.1 10E3/UL (ref 0–0.2)
BASOPHILS NFR BLD AUTO: 2 %
BILIRUB SERPL-MCNC: 0.3 MG/DL
BUN SERPL-MCNC: 53.8 MG/DL (ref 8–23)
CALCIUM SERPL-MCNC: 8.8 MG/DL (ref 8.8–10.4)
CHLORIDE SERPL-SCNC: 92 MMOL/L (ref 98–107)
CREAT SERPL-MCNC: 16.4 MG/DL (ref 0.67–1.17)
EGFRCR SERPLBLD CKD-EPI 2021: 3 ML/MIN/1.73M2
EOSINOPHIL # BLD AUTO: 0.4 10E3/UL (ref 0–0.7)
EOSINOPHIL NFR BLD AUTO: 7 %
ERYTHROCYTE [DISTWIDTH] IN BLOOD BY AUTOMATED COUNT: 15.1 % (ref 10–15)
FACTOR 2 INTERPRETATION: NORMAL
FACTOR V INTERPRETATION: NORMAL
GLUCOSE SERPL-MCNC: 135 MG/DL (ref 70–99)
HBV CORE AB SERPL QL IA: NONREACTIVE
HBV SURFACE AB SERPL IA-ACNC: <3.5 M[IU]/ML
HBV SURFACE AB SERPL IA-ACNC: NONREACTIVE M[IU]/ML
HBV SURFACE AG SERPL QL IA: NONREACTIVE
HCO3 SERPL-SCNC: 26 MMOL/L (ref 22–29)
HCT VFR BLD AUTO: 31 % (ref 40–53)
HCV AB SERPL QL IA: NONREACTIVE
HGB BLD-MCNC: 10 G/DL (ref 13.3–17.7)
HIV 1+2 AB+HIV1 P24 AG SERPL QL IA: NONREACTIVE
IMM GRANULOCYTES # BLD: 0 10E3/UL
IMM GRANULOCYTES NFR BLD: 0 %
INR PPP: 1.15 (ref 0.85–1.15)
LAB DIRECTOR COMMENTS: NORMAL
LAB DIRECTOR DISCLAIMER: NORMAL
LAB DIRECTOR INTERPRETATION: NORMAL
LAB DIRECTOR METHODOLOGY: NORMAL
LAB DIRECTOR RESULTS: NORMAL
LVEF ECHO: NORMAL
LYMPHOCYTES # BLD AUTO: 1.4 10E3/UL (ref 0.8–5.3)
LYMPHOCYTES NFR BLD AUTO: 23 %
MCH RBC QN AUTO: 31.9 PG (ref 26.5–33)
MCHC RBC AUTO-ENTMCNC: 32.3 G/DL (ref 31.5–36.5)
MCV RBC AUTO: 99 FL (ref 78–100)
MONOCYTES # BLD AUTO: 0.4 10E3/UL (ref 0–1.3)
MONOCYTES NFR BLD AUTO: 7 %
NEUTROPHILS # BLD AUTO: 3.7 10E3/UL (ref 1.6–8.3)
NEUTROPHILS NFR BLD AUTO: 61 %
NRBC # BLD AUTO: 0 10E3/UL
NRBC BLD AUTO-RTO: 0 /100
PLATELET # BLD AUTO: 289 10E3/UL (ref 150–450)
POTASSIUM SERPL-SCNC: 4.6 MMOL/L (ref 3.4–5.3)
PROT SERPL-MCNC: 7 G/DL (ref 6.4–8.3)
PSA SERPL DL<=0.01 NG/ML-MCNC: 0.44 NG/ML (ref 0–6.5)
RBC # BLD AUTO: 3.13 10E6/UL (ref 4.4–5.9)
SODIUM SERPL-SCNC: 138 MMOL/L (ref 135–145)
SPECIMEN DESCRIPTION: NORMAL
T PALLIDUM AB SER QL: NONREACTIVE
WBC # BLD AUTO: 6 10E3/UL (ref 4–11)

## 2024-07-29 PROCEDURE — 99205 OFFICE O/P NEW HI 60 MIN: CPT | Performed by: NURSE PRACTITIONER

## 2024-07-29 PROCEDURE — 86900 BLOOD TYPING SEROLOGIC ABO: CPT | Performed by: NURSE PRACTITIONER

## 2024-07-29 PROCEDURE — 86886 COOMBS TEST INDIRECT TITER: CPT | Performed by: NURSE PRACTITIONER

## 2024-07-29 PROCEDURE — 85613 RUSSELL VIPER VENOM DILUTED: CPT | Performed by: NURSE PRACTITIONER

## 2024-07-29 PROCEDURE — 99204 OFFICE O/P NEW MOD 45 MIN: CPT | Performed by: SURGERY

## 2024-07-29 PROCEDURE — 93000 ELECTROCARDIOGRAM COMPLETE: CPT | Performed by: INTERNAL MEDICINE

## 2024-07-29 PROCEDURE — 83516 IMMUNOASSAY NONANTIBODY: CPT | Performed by: NURSE PRACTITIONER

## 2024-07-29 PROCEDURE — 86787 VARICELLA-ZOSTER ANTIBODY: CPT | Performed by: NURSE PRACTITIONER

## 2024-07-29 PROCEDURE — 85730 THROMBOPLASTIN TIME PARTIAL: CPT | Performed by: NURSE PRACTITIONER

## 2024-07-29 PROCEDURE — 86832 HLA CLASS I HIGH DEFIN QUAL: CPT | Performed by: NURSE PRACTITIONER

## 2024-07-29 PROCEDURE — 36415 COLL VENOUS BLD VENIPUNCTURE: CPT | Performed by: NURSE PRACTITIONER

## 2024-07-29 PROCEDURE — 86780 TREPONEMA PALLIDUM: CPT | Performed by: NURSE PRACTITIONER

## 2024-07-29 PROCEDURE — 85041 AUTOMATED RBC COUNT: CPT | Performed by: NURSE PRACTITIONER

## 2024-07-29 PROCEDURE — 86481 TB AG RESPONSE T-CELL SUSP: CPT | Performed by: NURSE PRACTITIONER

## 2024-07-29 PROCEDURE — G0103 PSA SCREENING: HCPCS | Performed by: NURSE PRACTITIONER

## 2024-07-29 PROCEDURE — G0463 HOSPITAL OUTPT CLINIC VISIT: HCPCS | Performed by: SURGERY

## 2024-07-29 PROCEDURE — 86147 CARDIOLIPIN ANTIBODY EA IG: CPT | Performed by: NURSE PRACTITIONER

## 2024-07-29 PROCEDURE — 86833 HLA CLASS II HIGH DEFIN QUAL: CPT | Performed by: NURSE PRACTITIONER

## 2024-07-29 PROCEDURE — 86706 HEP B SURFACE ANTIBODY: CPT | Performed by: NURSE PRACTITIONER

## 2024-07-29 PROCEDURE — 85670 THROMBIN TIME PLASMA: CPT | Performed by: NURSE PRACTITIONER

## 2024-07-29 PROCEDURE — 86850 RBC ANTIBODY SCREEN: CPT | Performed by: NURSE PRACTITIONER

## 2024-07-29 PROCEDURE — 81378 HLA I & II TYPING HR: CPT | Performed by: NURSE PRACTITIONER

## 2024-07-29 PROCEDURE — 86665 EPSTEIN-BARR CAPSID VCA: CPT | Performed by: NURSE PRACTITIONER

## 2024-07-29 PROCEDURE — 86704 HEP B CORE ANTIBODY TOTAL: CPT | Performed by: NURSE PRACTITIONER

## 2024-07-29 PROCEDURE — 93306 TTE W/DOPPLER COMPLETE: CPT | Performed by: STUDENT IN AN ORGANIZED HEALTH CARE EDUCATION/TRAINING PROGRAM

## 2024-07-29 PROCEDURE — 87340 HEPATITIS B SURFACE AG IA: CPT | Performed by: NURSE PRACTITIONER

## 2024-07-29 PROCEDURE — 71046 X-RAY EXAM CHEST 2 VIEWS: CPT | Mod: GC | Performed by: RADIOLOGY

## 2024-07-29 PROCEDURE — 86644 CMV ANTIBODY: CPT | Performed by: NURSE PRACTITIONER

## 2024-07-29 PROCEDURE — 86803 HEPATITIS C AB TEST: CPT | Performed by: NURSE PRACTITIONER

## 2024-07-29 PROCEDURE — G0452 MOLECULAR PATHOLOGY INTERPR: HCPCS | Mod: 26 | Performed by: PATHOLOGY

## 2024-07-29 PROCEDURE — 84155 ASSAY OF PROTEIN SERUM: CPT | Performed by: NURSE PRACTITIONER

## 2024-07-29 PROCEDURE — 81240 F2 GENE: CPT | Performed by: NURSE PRACTITIONER

## 2024-07-29 PROCEDURE — 86905 BLOOD TYPING RBC ANTIGENS: CPT | Performed by: NURSE PRACTITIONER

## 2024-07-29 PROCEDURE — 82040 ASSAY OF SERUM ALBUMIN: CPT | Performed by: NURSE PRACTITIONER

## 2024-07-29 PROCEDURE — 85390 FIBRINOLYSINS SCREEN I&R: CPT | Mod: 26 | Performed by: PATHOLOGY

## 2024-07-29 PROCEDURE — 85610 PROTHROMBIN TIME: CPT | Performed by: NURSE PRACTITIONER

## 2024-07-29 RX ORDER — SEVELAMER HYDROCHLORIDE 800 MG/1
800 TABLET, FILM COATED ORAL
COMMUNITY

## 2024-07-29 NOTE — PROGRESS NOTES
Transplant Surgery Consult Note     Medical record number: [id number]  YOB: 1950,   Consult requested  for evaluation of kidney transplant candidacy.    Assessment and Recommendations:Mr. Rashid appears to be a good candidate for kidney transplantation and has a good understanding of the risks and benefits of this approach to the management of renal failure. The following issues should be addressed prior to finalizing his transplant candidacy:     Transplant order: Mr. Rashid has End stage renal failure due to ANCA vasculitis whose condition is not expected to resolve, is expected to progress, and is expected to continue to develop related comorbid conditions.  Recommend he be considered as a candidate for kidney transplant.  Cardiology consult for cardiac risk stratification to be ordered: Yes  CT abdomen and pelvis without contrast to be ordered for assessment of vascular targets: Yes  Transplant listing labs ordered to include HLA, ABOx2, Cr, etc.  Dietician consult ordered: Yes  Social work consult ordered: Yes  Imaging reports reviewed: None available for review  Radiology images reviewed: None available for review  Recipient suitable to move forward with work up of living donors:  Yes  MI 9/2023  Fungal infection in the sinuses 2022 s/p treatment (1/2024 no growth on cx)  MPO still positive as of 2/2023  Holiness. Will get list of blood products he will receive.   Superficial bladder cancer. Due for cystoscopy and monitoring.  Will need this for transplant evaluation  Derm evaluation  Was having chronic vomiting but has been resolved for 1 month.  Unsure of what this was from.  May need further workup if it returns and may need immunosuppression trial.    The majority of our visit was spent in counselling, discussing the medical and surgical risks of kidney transplantation. We discussed approximate wait time and how that is influenced by issues such as blood type and  sensitization (PRA) and access to a living donor. I contrasted potential waiting time for living vs  donor kidneys from  normal (0-85%) or higher (%) kidney donor profile index (KDPI) donors and their associated outcomes. I toldwould recommend this individual to consider kidneys from high KDPI donors. The reason for this decision is best summarized as: decreased dialysis related morbidity/mortality, accepting lower kidney graft survival rates. Potential surgical complications of kidney transplantation include bleeding, superficial or deep wound complications (infection, hernia, lymphocele), ureteral anastomotic failure (leak or stenosis), graft thrombosis, need for reoperation and other issues such as cardiac complications, pneumonia, deep venous thrombosis, pulmonary embolism, post transplant diabetes and death. The potential for recurrent disease or need for retransplantation was also addressed. We discussed the possible need for ureteral stent (and subsequent removal), and the utility of protocol biopsy and laboratory studies to evaluate for rejection or recurrent disease. We discussed the risk of graft rejection, our center's average graft and patient survival rates, immunosuppression protocols, as well as the potential opportunity to participate in clinical trials.  We also discussed the average length of stay, recovery process, and posttransplant lab and monitoring protocol.  I emphasized the need for strict immunosuppression medication adherence and the potential for complications of immunosuppression such as skin cancer or lymphoma, as well as a very low but not zero risk of donor-derived disease transmission risks (infection, cancer). Mr. Rashid asked good questions and his candidacy will be reviewed at our Multidisciplinary Selection Committee. Thank you for the opportunity to participate in Mr. Rashid's care.      Total time: 60 minutes        Myranda Batista MD  [facility]  Associate Professor of Surgery  Director, Living Kidney Donor Program.    ---------------------------------------------------------------------------------------------------    HPI: Mr. Rashid has End stage renal failure due to ANCA vasculitis. The patient is non-diabetic.       The patient is on dialysis.    Has potential kidney donors:  Doesn't know .  Interested in participation in paired exchange if a donor is willing: Doesn't know     The patient has the following pertinent history:       No    Yes  Dialysis:    []      [x] via:   PD    Blood Transfusion                  [x]      []  Number of units:   Most recently:  Pregnancy:    [x]      [] Number:       Previous Transplant:  [x]      [] Details:    Cancer    []      [x] Comment: Bladder cancer s/p transuretheral bladder resection. No BCG  Kidney stones   [x]      [] Comment:    remote (40yrs ago)  Recurrent infections  [x]      []  Type:                  Bladder dysfunction  [x]      [] Cause:    Claudication   [x]      [] Distance:    Previous Amputation  [x]      [] Cause:     Chronic anticoagulation  [x]      [] Indication:   Yazidi  [x]      []      Past Medical History:   Diagnosis Date    Bladder cancer (H)      Past Surgical History:   Procedure Laterality Date    repaired ruptured globe Right     TOTAL HIP ARTHROPLASTY Left 04/29/2014    TOTAL HIP ARTHROPLASTY Right 09/02/2014    VASCULAR SURGERY      chest port, peritoneal dialysis catheter     Family History   Problem Relation Age of Onset    Colon Cancer Mother 80    Coronary Artery Disease Mother     Diabetes Mother     Coronary Artery Disease Father     Thyroid Disease Father     Alcoholism Brother     Diabetes Brother      Social History     Socioeconomic History    Marital status:      Spouse name: Not on file    Number of children: Not on file    Years of education: Not on file    Highest education level: Not on file   Occupational History    Not on file    Tobacco Use    Smoking status: Never    Smokeless tobacco: Never   Substance and Sexual Activity    Alcohol use: Not Currently     Comment: Very little - maybe 2 drinks a year    Drug use: Never    Sexual activity: Not on file   Other Topics Concern    Parent/sibling w/ CABG, MI or angioplasty before 65F 55M? No   Social History Narrative    Not on file     Social Determinants of Health     Financial Resource Strain: Not on file   Food Insecurity: No Food Insecurity (9/6/2023)    [facility]    Hunger Vital Sign     Worried About Running Out of Food in the Last Year: Never true     Ran Out of Food in the Last Year: Never true   Transportation Needs: Not on file   Physical Activity: Not on file   Stress: Not on file   Social Connections: Unknown (4/11/2023)    [facility]    Social Connections     Frequency of Communication with Friends and Family: Not on file   Interpersonal Safety: Not on file   Housing Stability: Not on file       ROS:   CONSTITUTIONAL:  No fevers or chills  EYES: negative for icterus  ENT:  negative for hearing loss, tinnitus and sore throat  RESPIRATORY:  negative for cough, sputum, dyspnea  CARDIOVASCULAR:  negative for chest pain Fatigue  GASTROINTESTINAL:  negative for nausea, vomiting, diarrhea or constipation  GENITOURINARY:  negative for incontinence, dysuria, bladder emptying problems  HEME:  No easy bruising  INTEGUMENT:  negative for rash and pruritus  NEURO:  Negative for headache, seizure disorder  Allergies:   Allergies   Allergen Reactions    Levofloxacin Other (See Comments)    Omeprazole Nausea and Vomiting     Patient denied - no intolerance or allergy to this    Amlodipine Palpitations     Medications:  Prescription Medications as of 7/29/2024         Rx Number Disp Refills Start End Last Dispensed Date Next Fill Date Owning Pharmacy    aspirin (ASA) 81 MG chewable tablet  -- -- 9/12/2023 --       Sig: Take 81 mg  "by mouth daily    Class: Historical    Route: Oral    carvedilol (COREG) 12.5 MG tablet  -- --  --       Sig: Take 12.5 mg by mouth 2 times daily (with meals)    Class: Historical    Route: Oral    carvedilol (COREG) 6.25 MG tablet  -- -- 1/3/2024 1/2/2025       Sig: Take 6.25 mg by mouth 2 times daily (with meals)    Class: Historical    Route: Oral    levothyroxine (SYNTHROID/LEVOTHROID) 200 MCG tablet  -- -- 4/4/2023 --       Sig: Take 1 tablet by mouth daily    Class: Historical    Route: Oral    levothyroxine (SYNTHROID/LEVOTHROID) 25 MCG tablet  -- -- 11/24/2023 --       Sig: Take 1 tablet by mouth daily    Class: Historical    Route: Oral    lisinopril (ZESTRIL) 20 MG tablet  -- -- 9/12/2023 --       Sig: Take 20 mg by mouth daily    Class: Historical    Route: Oral    Naltrexone HCl, Pain, 4.5 MG CAPS  -- --  --       Sig: Take 4.5 mg by mouth daily    Class: Historical    Route: Oral    sevelamer HCl (RENAGEL) 800 MG tablet  -- --  --       Sig: Take 800 mg by mouth 3 times daily (with meals)    Class: Historical    Route: Oral          Exam:     BP (!) 151/85   Pulse 58   Ht 1.765 m (5' 9.5\")   Wt 77.2 kg (170 lb 1.6 oz)   SpO2 99%   BMI 24.76 kg/m    Appearance: in no apparent distress.   Skin: normal  Eyes:  no redness or discharge.  Sclera anicteric  Head and Neck: Normal, no rashes or jaundice  Respiratory: easy respirations, no audible wheezing.  Abdomen: flat, No distention, Surgical scars consistent with history, and PD catheter present  Extremities: femoral 2+/2+, Edema, none  Neuro: without deficit   Psychiatric: Normal mood and affect    Diagnostics:   No results found for this or any previous visit (from the past 672 hour(s)).  No results found for: \"CPRA\"   "

## 2024-07-29 NOTE — PROGRESS NOTES
Psychosocial Assessment  Patient Name/ Age: Cesar Rashid 73 year old   Medical Record #: [id number]  Duration of Interview:  45 mins  Process:   Face-to-Face Interview                (counseling < 50%)   Present at Appointment: Rob and Rafaela (Spouse)        : FAITH Smith, LOPEZ Date: July 29, 2024        Type of transplant: Kidney     Donor type: Rafaela has expressed interest in being a living donor for Rob.     Cadaver and spouse   Prior Transplants:    No Status of Transplant:       Current Living Situation    Location:   [address] [place], [place] [id number]  With Whom: lives with their spouse       Family/ Social Support:    Rob and Rafaela have one daughter and son who live in [place], [place].     Rob has two sisters who live an hour away.  Available, helpful   Committed relationship: Rob and Rafaela have been  for 45 years.      Stable/supportive   Other supports: Friends, [facility] community   Available, helpful       Activities/ Functional Ability    Current level: Rob uses glasses for reading.  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 Rob sometimes helps his son with the landscaping company.  retired   Job Description      Income   [employer] and Spouse's income   Insurance  [employer] Medicare Replacement     At this time, patient can afford medication costs:  Yes  Private Insurance       Medical Status    Current mode of treatment for ESRD Dialysis   Complications Neuropathy       Behavioral    Tobacco Use No Chemical Dependency No         Psychiatric Impairment No      Reading ability Good  Education Level: Some vocational training  Recent Legal History No      Coping Style/Strategies: Rob enjoys wood working, cutting wood, and going for walks with spouse.      Ability to Adhere to Complex  Medical Regime: Yes     Adherence History: Rob reports generally following his providers recommendations and instructions.         Education  _X_ Medicare  _X_ Rehabilitation  _X_ Donor issues  _X_ Community resources  _X_ Post discharge housing  _X_ Financial resources  _X_ Medical insurance options  _X_ Psych adjustment  _X_ Family adjustment  _X_ Health Care Directive - Yes, Will Provide   Psychosocial Risks of Transplant Reviewed and Discussed:  _X_ Increased stress related to emotional,            family, social, employment or financial           situation  _X_ Affect on work and/or disability benefits  _X_ Affect on future life insurance  _X_ Transplant outcome expectations may           not be met  _X_ Mental Health Risks: anxiety,           depression, PTSD, guilt, grief and           chronic fatigue     Notable Items:   Rob and Rafaela identify as Jehovah Witnesses.       Final Evaluation/Assessment   Patient seemed to process information well. Appeared well informed, motivated and able to follow post transplant requirements. Behavior was appropriate during interview. Has adequate income and insurance coverage. Adequate social support. No major contraindications noted for transplant.  At this time patient appears to understand the risks and benefits of transplant.      Recommendation  Acceptable    Selection Criteria Met:  Plan for support Yes   Chemical Dependence Yes   Smoking Yes   Mental Health Yes   Adequate Finances Yes    Signature: FAITH Smith, LGSW   Title: Clinical

## 2024-07-29 NOTE — PROGRESS NOTES
"Kidney Transplant Evaluation     Participants were informed of the benefits of transplant as well as potential risks such as infection, cancer, and death.  The need for total adherence with immunosuppression medications and following transplant regimens was stressed.  The overall evaluation/approval/listing process was reviewed.        The patient was provided with the following documents:  What You Need to Know About a Kidney Transplant  Adult Kidney Transplant - A Guide for Patients  SRTR Data Sheet - Kidney  Brochure - Kidney Allocation  Brochure - Multiple Listing and Waiting Time Transfer  What Every Patient Needs to Know (UNOS)  Finding a Donor  KDPI Consent  Receipt of Information form    Signed the  Receipt of Information for Organ Transplant Recipient.\" He was provided transplant coordinator's business card and instructed to call with additional questions.      Summary    Team s concerns/comments: Follow up on anca vasculitis - may need rheumatology, due for a cystoscopy, repeat CT - chest/abd/pelvis. Shinto    Candidacy category: No data was found    Action/Plan: continue with evaluation    Expected Selection Meeting Discussion: 8/07/24        "

## 2024-07-29 NOTE — Clinical Note
"7/29/2024      Cesar Rashid  [address] [facility] [address]      Dear Colleague,    Thank you for referring your patient, Cesar Rashid, to the [facility]. Please see a copy of my visit note below.    Transplant Surgery Consult Note     Medical record number: [id number]  YOB: 1950,   Consult requested  for evaluation of {ORGAN:668520} transplant candidacy.    Assessment and Recommendations:Mr. Rashid appears to be a {QUALITY:0969308} candidate for kidney transplantation and has a {GOOD/FAIR/POOR:724104::\"good\"} understanding of the risks and benefits of this approach to the management of renal failure. The following issues should be addressed prior to finalizing his transplant candidacy:     Transplant order: Mr. Rashid has {[facility] TRANSPLANT DX:704935} whose condition is not expected to resolve, is expected to progress, and is expected to continue to develop related comorbid conditions.  Recommend he be considered as a candidate for ***.  Cardiology consult for cardiac risk stratification to be ordered: {Yes and No:143042}  CT abdomen and pelvis without contrast to be ordered for assessment of vascular targets: {Yes and No:997708}  Transplant listing labs ordered to include HLA, ABOx2, Cr, etc.  Dietician consult ordered: Yes  Social work consult ordered: Yes  Imaging reports reviewed:  ***  Radiology images reviewed:***  Recipient suitable to move forward with work up of living donors:  {Yes and No:863060}  MI 9/2023  Fungal infection in the sinuses 2022 s/p treatment (1/2024 no growth on cx)  MPO still positive as of 2/2023  Muslim. Will get list of blood products he will receive.   Superficial bladder cancer. Due for cystoscopy and monitoring  Derm  Was having chronic vomiting but has been resolved for 1 month    The majority of our visit was spent in counselling, discussing the medical and surgical risks of kidney transplantation. We " discussed approximate wait time and how that is influenced by issues such as blood type and sensitization (PRA) and access to a living donor. I contrasted potential waiting time for living vs  donor kidneys from  normal (0-85%) or higher (%) kidney donor profile index (KDPI) donors and their associated outcomes. I {Would:660240} recommend this individual to consider kidneys from high KDPI donors. The reason for this decision is best summarized as: {KDPI REASON:350213271}. Potential surgical complications of kidney transplantation include bleeding, superficial or deep wound complications (infection, hernia, lymphocele), ureteral anastomotic failure (leak or stenosis), graft thrombosis, need for reoperation and other issues such as cardiac complications, pneumonia, deep venous thrombosis, pulmonary embolism, post transplant diabetes and death. The potential for recurrent disease or need for retransplantation was also addressed. We discussed the possible need for ureteral stent (and subsequent removal), and the utility of protocol biopsy and laboratory studies to evaluate for rejection or recurrent disease. We discussed the risk of graft rejection, our center's average graft and patient survival rates, immunosuppression protocols, as well as the potential opportunity to participate in clinical trials.  We also discussed the average length of stay, recovery process, and posttransplant lab and monitoring protocol.  I emphasized the need for strict immunosuppression medication adherence and the potential for complications of immunosuppression such as skin cancer or lymphoma, as well as a very low but not zero risk of donor-derived disease transmission risks (infection, cancer). Mr. Rashid asked good questions and his candidacy will be reviewed at our Multidisciplinary Selection Committee. Thank you for the opportunity to participate in Mr. Rashid's care.      Total time: *** minutes  Counselling  time: *** minutes    {[facility] TRANSPLANT MD SIGNATURE:[id number]}    ---------------------------------------------------------------------------------------------------    HPI: Mr. Rashid has {[facility] TRANSPLANT DX:601799}. The patient is non-diabetic.       The patient {is/is not:355938} on dialysis.    Has potential kidney donors:  {Yes/No:04103831}.  Interested in participation in paired exchange if a donor is willing: {Yes/No Doesn't know ( Default Yes):28460892}    The patient has the following pertinent history:       No    Yes  Dialysis:    []      [x] via:   PD    Blood Transfusion                  [x]      []  Number of units:   Most recently:  Pregnancy:    [x]      [] Number:       Previous Transplant:  [x]      [] Details:    Cancer    []      [x] Comment: Bladder cancer s/p transuretheral bladder resection. No BCG  Kidney stones   [x]      [] Comment:    remote (40yrs ago)  Recurrent infections  [x]      []  Type:                  Bladder dysfunction  [x]      [] Cause:    Claudication   [x]      [] Distance:    Previous Amputation  [x]      [] Cause:     Chronic anticoagulation  [x]      [] Indication:   Latter day  [x]      []      Past Medical History:   Diagnosis Date    Bladder cancer (H)      Past Surgical History:   Procedure Laterality Date    repaired ruptured globe Right     TOTAL HIP ARTHROPLASTY Left 04/29/2014    TOTAL HIP ARTHROPLASTY Right 09/02/2014    VASCULAR SURGERY      chest port, peritoneal dialysis catheter     Family History   Problem Relation Age of Onset    Colon Cancer Mother 80    Coronary Artery Disease Mother     Diabetes Mother     Coronary Artery Disease Father     Thyroid Disease Father     Alcoholism Brother     Diabetes Brother      Social History     Socioeconomic History    Marital status:      Spouse name: Not on file    Number of children: Not on file    Years of education: Not on file    Highest education level: Not on file   Occupational History    Not  on file   Tobacco Use    Smoking status: Never    Smokeless tobacco: Never   Substance and Sexual Activity    Alcohol use: Not Currently     Comment: Very little - maybe 2 drinks a year    Drug use: Never    Sexual activity: Not on file   Other Topics Concern    Parent/sibling w/ CABG, MI or angioplasty before 65F 55M? No   Social History Narrative    Not on file     Social Determinants of Health     Financial Resource Strain: Not on file   Food Insecurity: No Food Insecurity (9/6/2023)    Received from [facility]    Hunger Vital Sign     Worried About Running Out of Food in the Last Year: Never true     Ran Out of Food in the Last Year: Never true   Transportation Needs: Not on file   Physical Activity: Not on file   Stress: Not on file   Social Connections: Unknown (4/11/2023)    [facility]    Social Connections     Frequency of Communication with Friends and Family: Not on file   Interpersonal Safety: Not on file   Housing Stability: Not on file       ROS:   CONSTITUTIONAL:  No fevers or chills  EYES: negative for icterus  ENT:  negative for hearing loss, tinnitus and sore throat  RESPIRATORY:  negative for cough, sputum, dyspnea  CARDIOVASCULAR:  negative for chest pain {Vascular Disease Manifestation:946555}  GASTROINTESTINAL:  negative for nausea, vomiting, diarrhea or constipation  GENITOURINARY:  negative for incontinence, dysuria, bladder emptying problems  HEME:  No easy bruising  INTEGUMENT:  negative for rash and pruritus  NEURO:  Negative for headache, seizure disorder  Allergies:   Allergies   Allergen Reactions    Levofloxacin Other (See Comments)    Omeprazole Nausea and Vomiting     Patient denied - no intolerance or allergy to this    Amlodipine Palpitations     Medications:  Prescription Medications as of 7/29/2024         Rx Number Disp Refills Start End Last Dispensed Date Next Fill Date Owning Pharmacy    aspirin (ASA) 81 MG chewable tablet   "-- -- 9/12/2023 --       Sig: Take 81 mg by mouth daily    Class: Historical    Route: Oral    carvedilol (COREG) 12.5 MG tablet  -- --  --       Sig: Take 12.5 mg by mouth 2 times daily (with meals)    Class: Historical    Route: Oral    carvedilol (COREG) 6.25 MG tablet  -- -- 1/3/2024 1/2/2025       Sig: Take 6.25 mg by mouth 2 times daily (with meals)    Class: Historical    Route: Oral    levothyroxine (SYNTHROID/LEVOTHROID) 200 MCG tablet  -- -- 4/4/2023 --       Sig: Take 1 tablet by mouth daily    Class: Historical    Route: Oral    levothyroxine (SYNTHROID/LEVOTHROID) 25 MCG tablet  -- -- 11/24/2023 --       Sig: Take 1 tablet by mouth daily    Class: Historical    Route: Oral    lisinopril (ZESTRIL) 20 MG tablet  -- -- 9/12/2023 --       Sig: Take 20 mg by mouth daily    Class: Historical    Route: Oral    Naltrexone HCl, Pain, 4.5 MG CAPS  -- --  --       Sig: Take 4.5 mg by mouth daily    Class: Historical    Route: Oral    sevelamer HCl (RENAGEL) 800 MG tablet  -- --  --       Sig: Take 800 mg by mouth 3 times daily (with meals)    Class: Historical    Route: Oral          Exam:     BP (!) 151/85   Pulse 58   Ht 1.765 m (5' 9.5\")   Wt 77.2 kg (170 lb 1.6 oz)   SpO2 99%   BMI 24.76 kg/m    Appearance: {Distress levels:788958::\"in no apparent distress.\"}   Skin: {SKIN:203898}  Eyes:  no redness or discharge.  Sclera anicteric  Head and Neck: {JAUNDICE:964211:s}  Respiratory: easy respirations, no audible wheezing.  Abdomen: {ABDOMEN INSPECTION:602}, {ABDOMEN (MM):907280}   Extremities: {PULSE POSITIVES LIST:763303:s}, Edema, {EDEMA1:239952}  Neuro: {NEURO:981949}   Psychiatric: Normal mood and affect    Diagnostics:   No results found for this or any previous visit (from the past 672 hour(s)).  No results found for: \"CPRA\"       Again, thank you for allowing me to participate in the care of your patient.        Sincerely,        Myranda Batista MD, MD  "

## 2024-07-29 NOTE — LETTER
7/29/2024      Cesar Rashid  [address] [address]      Dear Colleague,    Thank you for referring your patient, Cesar Rashid, to the [facility] TRANSPLANT CLINIC. Please see a copy of my visit note below.    TRANSPLANT NEPHROLOGY RECIPIENT EVALUATION NOTE    Recommendations:  - Visit with Dr. Stoddard for input on ANCA disease quiescence.   - cardiac risk assessment   - CT a/p/c and iliacs  - Taoist: unknown to this writer at time of evaluation. Will need to confirm willingness for blood transfusions/ cell saver.   -urology/ updated cysto  - colonoscopy   - repeat lupus anticoagulant with SPEP/ UPEP and beta-2 glycoproteins,  per lab recs.     Assessment and Plan:  # Kidney Transplant Evaluation: Patient is a fair candidate overall. Benefits of a living donor transplant were discussed.    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 Refused a kidney biopsy. HD initiation 7/17/20 since changed to PD which is going well. May benefit from a kidney transplant. Repeat MPO titer today decreased/stable ~ 58. His course has also been  c/b  chronic frontal sinusitis. Recommend evaluation with Dr. Stoddard for input on disease quiescence.     # Cardiac Risk: H/o STEMI and VF cardiac arrest 9/6/23 s/p shockwave lithotripsy and SURESH to RCA and SURESH x1 to RPLA, ischemic cardiomyopathy with LVEF of 45%, on amiodarone for VT. During his hospital stay he had an in-hospital VT arrest. Repeat coronary angio was completed that showed no evidence of In Stent restenosis. S/p ICD placement. Follow-up echo 11/17/23 showed LVEF of 56% with mild hypokinesis of mid lateral wall, mild MR. Would need risk assessment,     # PAD Screening: scattered disease on 2019 CT. Refer to surgery for updated imaging.     # Chronic frontal sinusitis: followed by ENT, managing with sinus rinses and appears stable with last infection in 12/2023.     #  Restoration: unknown to this writer at time of evaluation. Will need to confirm willingness for blood transfusions/ cell saver.     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     Path showed:   A.  Bladder, bladder tumor, transurethral resection:    Non-invasive low grade papillary urothelial carcinoma  Muscularis propria is present There was no evidence of muscle invasion.     Overdue for urology follow up, scheuduled for 8/8.     # Indeterminant 3 mm nodule right middle lobe:  most likely benign on 2019 CT along with a couple of tiny 3 mm subpleural nodules along the major fissures. Most likely benign. Recommend repeat CT.     # Indeterminate lupus anticoagulant: will repeat with SPEP/ UPEP and beta-2 glycoproteins per lab recs.     # Health Maintenance: Colonoscopy: Not up to date and Dental: should be UTD in the past 6-12 months.     - Discussed the risks and benefits of a transplant, including the risk of surgery and immunosuppression medications.  Patient's overall evaluation will be discussed in the Transplant Program's regular meeting with a final recommendation on the patients suitability for transplant to be made at that time.    Pending completion of the full evaluation, patient presently appears to be enough of an acceptable kidney transplant recipient candidate to have any potential kidney donors start the evaluation process.      Evaluation:  Cesar Rashid was seen in consultation at the request of Dr. Myranda Btaista for evaluation as a potential kidney transplant recipient.    Reason for Visit:  Cesar Rashid is a 73 year old male with ESKD from ANCA vasculitis, who presents for kidney transplant evaluation.    History of Present Illness:           Kidney Disease Hx:         Presented in 12/2019 with LLUVIA (Cr ~7 mg/dl), microscopic hematuria (11-20 RBCs on UA) and a UPCR ~ 3.9 g/g. Serologies unremarkable  for ISABEL -ve , C3/C4 N , SPEP , FLC  and Anti GBM, however, was positive for MPO ANCA  (level  ~ 70.5 CU) / PR3 negative. He refused a kidney biopsy / IS/Ritux at the time. He ultimately started hemodialysis on 7/17/2020. During that time he was also diagnosed with bladder cancer s/p TURBT in 3/2020 and received BCG. His last cysto was negative in 8/2023. He has since changed to PD which is going well. His delay in coming in for a transplant evaluation was because he was unsure if he wanted a transplant.            Kidney Disease Dx: ANCA vasculitis       Biopsy Proven: No         On Dialysis: Yes, Date initiated: 7/2020 and Dialysis Type: Incenter HD; making a little urine,  BPs reportedly stable       Primary Nephrologist: Dr. Loreta Valdivia.        H/o Kidney Stones: Yes; 2 stones that he  passed on his own many years ago.        H/o Recurrent/Frequent UTI: No         Diabetic Hx: None           Cardiac Disease    H/o STEMI and VF cardiac arrest 9/6/23 s/p shockwave lithotripsy and SURESH to RCA and SURESH x1 to RPLA, ischemic cardiomyopathy with LVEF of 45%, on amiodarone for VT. During his hospital stay he had an in-hospital VT arrest. Repeat coronary angio was completed that showed no evidence of InStent restenosis. S/p ICD placement. Follow-up echo 11/17/23 showed LVEF of 56% with mild hypokinesis of mid lateral wall, mild MR.         Viral Serology Status       CMV IgG Antibody: Unknown       EBV IgG Antibody: Unknown         Volume Status/Weight:        Volume status: Euvolemic       Weight:  Acceptable BMI       BMI: There is no height or weight on file to calculate BMI.         Functional Capacity/Frailty:        Stays active maintaining his property,  mowing the lawn / cutting wood. Went to [place] last winter to vacation. Walks twice a day outside with his wife for 30 min, believes he could walk a mile. NO exertional or claudication symptoms.     Fatigue/Decreased Energy: [] No [x] Yes Sometimes tired   Chest  Pain or SOB with Exertion: [x] No [] Yes    Significant Weight Change: [x] No [] Yes    Nausea, Vomiting or Diarrhea: [x] No [] Yes    Fever, Sweats or Chills:  [x] No [] Yes    Leg Swelling [x] No [] Yes        History of Cancer:   # Urothelial carcinoma (3/2020): S/p TURBT and BCG, neg cysto last done in 8/2023    Path showed:   A.  Bladder, bladder tumor, transurethral resection:    Non-invasive low grade papillary urothelial carcinoma  Muscularis propria is present There was no evidence of muscle invasion.     Overdue for urology follow up, scheuduled  for 8/8.         Allergy Testing Questions:   Medication that caused a reaction  amlodipine -palpatations    Antibiotics used that didn't give an allergic reaction?  Patient doesn't know    COVID Vaccination Up To Date: Not asked    Potential Living Kidney Donors: Yes, maybe wife     Review of Systems:  A comprehensive review of systems was obtained and negative, except as noted in the HPI or PMH.    Past Medical History:   Medical record was reviewed and PMH was discussed with patient and noted below.  Past Medical History:   Diagnosis Date     Bladder cancer (H)        Past Social History:   Past Surgical History:   Procedure Laterality Date     repaired ruptured globe Right      TOTAL HIP ARTHROPLASTY Left 04/29/2014     TOTAL HIP ARTHROPLASTY Right 09/02/2014     VASCULAR SURGERY      chest port, peritoneal dialysis catheter     Personal history of bleeding or anesthesia problems: No    Family History:  Family History   Problem Relation Age of Onset     Colon Cancer Mother      Coronary Artery Disease Mother      Diabetes Mother      Coronary Artery Disease Father      Thyroid Disease Father      Alcoholism Brother      Diabetes Brother        Personal History:   Social History     Socioeconomic History     Marital status:      Spouse name: Not on file     Number of children: Not on file     Years of education: Not on file     Highest education level:  Not on file   Occupational History     Not on file   Tobacco Use     Smoking status: Never     Smokeless tobacco: Never   Substance and Sexual Activity     Alcohol use: Not Currently     Comment: Very little - maybe 2 drinks a year     Drug use: Never     Sexual activity: Not on file   Other Topics Concern     Parent/sibling w/ CABG, MI or angioplasty before 65F 55M? No   Social History Narrative     Not on file     Social Determinants of Health     Financial Resource Strain: Not on file   Food Insecurity: No Food Insecurity (9/6/2023)    Received from [facility]    Hunger Vital Sign      Worried About Running Out of Food in the Last Year: Never true      Ran Out of Food in the Last Year: Never true   Transportation Needs: Not on file   Physical Activity: Not on file   Stress: Not on file   Social Connections: Unknown (4/11/2023)    Received from [employer] [facility]    Social Connections      Frequency of Communication with Friends and Family: Not on file   Interpersonal Safety: Not on file   Housing Stability: Not on file       Allergies:  Allergies   Allergen Reactions     Levofloxacin Other (See Comments)     Omeprazole Nausea and Vomiting     Patient denied - no intolerance or allergy to this     Amlodipine Palpitations       Medications:  Current Outpatient Medications   Medication Sig Dispense Refill     aspirin (ASA) 81 MG chewable tablet Take 81 mg by mouth daily       carvedilol (COREG) 12.5 MG tablet Take 12.5 mg by mouth 2 times daily (with meals)       carvedilol (COREG) 6.25 MG tablet Take 6.25 mg by mouth 2 times daily (with meals)       levothyroxine (SYNTHROID/LEVOTHROID) 200 MCG tablet Take 1 tablet by mouth daily       levothyroxine (SYNTHROID/LEVOTHROID) 25 MCG tablet Take 1 tablet by mouth daily       lisinopril (ZESTRIL) 20 MG tablet Take 20 mg by mouth daily       Naltrexone HCl, Pain, 4.5 MG CAPS Take 4.5 mg by mouth daily       No current facility-administered  medications for this visit.       Vitals:  There were no vitals taken for this visit.    Exam:  GENERAL APPEARANCE: alert and no distress  HENT: mouth without ulcers or lesions  RESP: lungs clear to auscultation - no rales, rhonchi or wheezes  CV: regular rhythm, normal rate, no rub, no murmur  EDEMA: no LE edema bilaterally  ABDOMEN: soft, nondistended, nontender, bowel sounds normal  MS: extremities normal - no gross deformities noted, no evidence of inflammation in joints, no muscle tenderness  SKIN: no rash    Results:   No results found for this or any previous visit (from the past 336 hour(s)).    60 minutes spent on the date of the encounter doing chart review, history and exam, documentation and further activities per the note.         Again, thank you for allowing me to participate in the care of your patient.        Sincerely,        Shyla Durham NP

## 2024-07-30 LAB
CARDIOLIPIN IGG SER IA-ACNC: 34 GPL-U/ML
CARDIOLIPIN IGG SER IA-ACNC: ABNORMAL
CARDIOLIPIN IGM SER IA-ACNC: <2 MPL-U/ML
CARDIOLIPIN IGM SER IA-ACNC: NEGATIVE
CMV IGG SERPL IA-ACNC: >10 U/ML
CMV IGG SERPL IA-ACNC: ABNORMAL
DRVVT CONFIRM NORMALIZED RATIO: 1.13
DRVVT SCREEN MIX RATIO: 1.11
DRVVT SCREEN RATIO: 1.31
EBV VCA IGG SER IA-ACNC: >750 U/ML
EBV VCA IGG SER IA-ACNC: POSITIVE
LA PPP-IMP: ABNORMAL
LUPUS INTERPRETATION: ABNORMAL
MYELOPEROXIDASE AB SER IA-ACNC: 58 U/ML
MYELOPEROXIDASE AB SER IA-ACNC: POSITIVE
PROTEINASE3 AB SER IA-ACNC: <1 U/ML
PROTEINASE3 AB SER IA-ACNC: NEGATIVE
PTT RATIO: 1.11
THROMBIN TIME: 17.2 SECONDS (ref 13–19)
VZV IGG SER QL IA: 1922 INDEX
VZV IGG SER QL IA: POSITIVE

## 2024-07-31 LAB
GAMMA INTERFERON BACKGROUND BLD IA-ACNC: 0.01 IU/ML
M TB IFN-G BLD-IMP: NEGATIVE
M TB IFN-G CD4+ BCKGRND COR BLD-ACNC: 9.99 IU/ML
MITOGEN IGNF BCKGRD COR BLD-ACNC: -0.01 IU/ML
MITOGEN IGNF BCKGRD COR BLD-ACNC: 0.01 IU/ML
QUANTIFERON MITOGEN: 10 IU/ML
QUANTIFERON NIL TUBE: 0.01 IU/ML
QUANTIFERON TB1 TUBE: 0.02 IU/ML
QUANTIFERON TB2 TUBE: 0

## 2024-08-01 LAB
ATRIAL RATE - MUSE: 65 BPM
DIASTOLIC BLOOD PRESSURE - MUSE: NORMAL MMHG
INTERPRETATION ECG - MUSE: NORMAL
P AXIS - MUSE: 30 DEGREES
PR INTERVAL - MUSE: 186 MS
QRS DURATION - MUSE: 96 MS
QT - MUSE: 442 MS
QTC - MUSE: 459 MS
R AXIS - MUSE: 17 DEGREES
SYSTOLIC BLOOD PRESSURE - MUSE: NORMAL MMHG
T AXIS - MUSE: 2 DEGREES
VENTRICULAR RATE- MUSE: 65 BPM

## 2024-08-06 LAB
A*: NORMAL
A*LOCUS SEROLOGIC EQUIVALENT: 2403
A*LOCUS: NORMAL
A*SEROLOGIC EQUIVALENT: 30
ABTEST METHOD: NORMAL
B*: NORMAL
B*LOCUS SEROLOGIC EQUIVALENT: 13
B*LOCUS: NORMAL
B*SEROLOGIC EQUIVALENT: 60
BW-1: NORMAL
BW-2: NORMAL
C*: NORMAL
C*LOCUS SEROLOGIC EQUIVALENT: 10
C*LOCUS: NORMAL
C*SEROLOGIC EQUIVALENT: 6
DPA1*: NORMAL
DPB1*: NORMAL
DPB1*LOCUS: NORMAL
DQA1*: NORMAL
DQA1*LOCUS: NORMAL
DQB1*LOCUS SEROLOGIC EQUIVALENT: 8
DQB1*LOCUS: NORMAL
DRB1*: NORMAL
DRB1*LOCUS SEROLOGIC EQUIVALENT: 4
DRB1*LOCUS: NORMAL
DRB1*SEROLOGIC EQUIVALENT: 4
DRB4*LOCUS SEROLOGIC EQUIVALENT: 53
DRB4*LOCUS: NORMAL
DRSSO TEST METHOD: NORMAL

## 2024-08-07 ENCOUNTER — TELEPHONE (OUTPATIENT)
Dept: TRANSPLANT | Facility: CLINIC | Age: 74
End: 2024-08-07
Payer: COMMERCIAL

## 2024-08-07 ENCOUNTER — COMMITTEE REVIEW (OUTPATIENT)
Dept: TRANSPLANT | Facility: CLINIC | Age: 74
End: 2024-08-07
Payer: COMMERCIAL

## 2024-08-07 NOTE — COMMITTEE REVIEW
Kidney/Pancreas Committee Review Note     Evaluation Date: 7/29/2024  Committee Review Date: 8/7/2024    Organ being evaluated for: Kidney    Transplant Phase: Evaluation  Transplant Status: Active    Transplant Coordinator: Nalini Vargas  Transplant Surgeon: Dr. Batista       Referring Physician: Loreta Valdivia    Primary Diagnosis: ESRD likely secondary to MPO ANCA Vasculitis  Secondary Diagnosis:     Committee Review Members:  Cardiology Kristyn Dallas, APRN CNP   Nephrology Shyla Durham, YUNIER, Debi Whitfield MD, Ky Riley, APRN CNP   Nurse Shabbir Morales, RN, TRAV SCHMIDT, BIJU   Nutrition Zoila Thomas, RD   Pharmacist Yvette Barraza, [facility]    - Clinical Maricruz Ward, MSW, Paul Gil MSW, Reggie Hines, MSW   Transplant LINDA AHUMADA, BIJU, Laura Resendiz, BIJU, Shyla Durham, YUNIER, Radha Neal, BIJU, Luciana Salmon, BIJU, MARY ELLEN Guerrero CNP, Brenda Hernandez, BIJU, Luba Matamoros, BIJU, Katy Goldsmith, BIJU, Susie Romero, BIJU, Nalini Vargas, RN   Transplant Surgery Ashutosh Calero MD       Transplant Eligibility: Irreversible chronic kidney disease treated w/dialysis or expected need for dialysis    Committee Review Decision: Needs Re-presentation    Relative Contraindications: None    Absolute Contraindications: None     Committee Chair Ashutosh Calero MD verbally attested to the committee's decision.    Committee Discussion Details: Reviewed pt's medical status and evaluation results to date with multidisciplinary committee.    Recommended the following evaluation items:    Should first complete a return visit with a Transplant Surgeon: Needs to be seen again by a transplant surgeon to discuss his willingness of accepting blood products due to identifying as a Jew. Need to determine what products he would be willing to accept due to his complex cardiac history and overall safety of proceeding with a kidney transplant.  Recommend he brings a representative from his [employer] to the appointment to assist in identifying acceptable blood products.     Following surgeon visit and if determined ok to proceed with transplant, should complete:   Nephrology: Should see Dr. Stoddard for input on ANCA disease quiescence.   Cardiology: Needs a cardiac risk assessment.   Imaging: Needs CT abdomen/pelvis and Iliac US to assess vascular tagets. Needs a CT Chest for follow-up of right middle lobe nodule.   Urology: Needs to follow-up with urology and have an updated cystoscopy.   Labs: Needs repeat cardiolipin and lupus anticoagulant in 12 weeks.   Health Maintenance: Colonoscopy, Dental and vaccinations.     Patient should have live donors register now to initiate donor evaluation: Yes    Committee determined that patient is a Good candidate for Kidney     Listing plan: Needs Representation     A2B Candidate: No    Patient will be called and summary letter will be sent.

## 2024-08-07 NOTE — TELEPHONE ENCOUNTER
Attempted to reach patient to review selection committee results and was able to speak to patient's wife, Rafaela. Rafaela requested I call back tomorrow morning as Rob isn't home at the moment. I let her know I will call tomorrow morning.

## 2024-08-08 ENCOUNTER — TELEPHONE (OUTPATIENT)
Dept: TRANSPLANT | Facility: CLINIC | Age: 74
End: 2024-08-08
Payer: COMMERCIAL

## 2024-08-08 DIAGNOSIS — N18.9 ANEMIA DUE TO CHRONIC KIDNEY DISEASE: ICD-10-CM

## 2024-08-08 DIAGNOSIS — D63.1 ANEMIA DUE TO CHRONIC KIDNEY DISEASE: ICD-10-CM

## 2024-08-08 DIAGNOSIS — I77.82 ANCA-ASSOCIATED VASCULITIS (H): ICD-10-CM

## 2024-08-08 DIAGNOSIS — Z76.82 ORGAN TRANSPLANT CANDIDATE: Primary | ICD-10-CM

## 2024-08-08 DIAGNOSIS — N18.6 END STAGE RENAL DISEASE (H): ICD-10-CM

## 2024-08-08 LAB
PROTOCOL CUTOFF: NORMAL
SA 1  COMMENTS: NORMAL
SA 1 CELL: NORMAL
SA 1 TEST METHOD: NORMAL
SA 2 CELL: NORMAL
SA 2 COMMENTS: NORMAL
SA 2 TEST METHOD: NORMAL
SA1 HI RISK ABY: NORMAL
SA1 MOD RISK ABY: NORMAL
SA2 HI RISK ABY: NORMAL
SA2 MOD RISK ABY: NORMAL
UNACCEPTABLE ANTIGENS: NORMAL
UNOS CPRA: 0

## 2024-08-08 NOTE — LETTER
08/08/24        Cesar Rashid  [address] [place] [id number]        Dear Cesar,    It was a pleasure to see you recently for consideration of kidney transplantation. Your pre-transplant evaluation results were reviewed at our Multidisciplinary Selection Committee on 08/07/2024. The committee is requesting the following items are completed before determining your candidacy:    Return visit with a transplant surgeon to discuss if you are willing to receive any blood products and document which products you are to ensure safety of proceeding with a kidney transplant surgery. Our schedulers will call you to schedule.   Abdominal pelvic CT without contrast to assess for arterial calcifications. Our schedulers will call you to schedule.   Aorto iliac artery ultrasound to assess the blood flow through vital blood vessels for transplant. Our schedulers will call you to schedule.   Chest CT to follow-up on 2019 CT for 3 mm right middle lobe nodule. Our schedulers will call you to schedule.   Cardiologist appointment for an assessment of your heart status and for a recommendation to proceed with a kidney transplant surgery and if any additional testing needs to be completed. You may work with a local cardiologist to complete or order will be placed following surgeon visit.   Lab appointment in 12 weeks to recheck your lupus anticoagulant panel and cardiolipins. Order will be placed following your visit with the transplant surgeon.   Nephrology visit with Dr. Helio Stoddard for input on ANCA disease quiescence. Order will be placed following your visit with the transplant surgeon.   Urology appointment including an updated cystoscopy for their recommendation to proceed with a kidney transplant surgery. Please work with your urologist to complete this.  Colonoscopy needs to be up-to-date. Please call to have this scheduled where you would prefer to complete.   Dental work needs to be up-to-date. Please work with  your dentist to complete.  Please work with your primary care provider to ensure you are up-to-date on Hepatitis B, Shingrix, Pneumovax and all other recommended vaccinations.     For any questions, please contact the Transplant Office at [phone]. My direct number is [phone].       Sincerely,  Nalini Vargas RN; Pre Kidney Transplant Coordinator    Solid Organ Transplant  [facility]    CC: Dr. Steffi Hatch, Ani YaFern Mckeon

## 2024-08-08 NOTE — TELEPHONE ENCOUNTER
Contacted patient to review outcome of selection committee meeting (See selection committee encounter).   Explained to patient that he/she needs to complete all components of the evaluation to be eligible for active status on the waiting list or to proceed with a live donor kidney transplant.   Reviewed next steps based on outcomes:   Patient will not be listed (patient is on dialysis and evaluation is not complete), patient will receive:    - An Evaluation Summary Letter indicating what is needed to complete evaluation-discussed with patient if they would like to have testing done with [facility] or locally  Confirmed with patient that on successful completion of outstanding components, patient is eligible for active status and they will receive a follow-up call.   Confirmed that patient has contact information for additional questions or concerns.  Patient states he did see urology yesterday and had a cystoscopy completed. He did say he is not willing to receive any blood products.  However, I did let him know we are recommending he speaks with a transplant surgeon to discuss in greater detail what blood products he would accept and to determine how safe it is to proceed with a kidney transplant surgery due to his cardiac history. Patient was in good understanding of the plan and has my direct number for any additional questions.

## 2024-08-20 ENCOUNTER — TRANSFERRED RECORDS (OUTPATIENT)
Dept: HEALTH INFORMATION MANAGEMENT | Facility: CLINIC | Age: 74
End: 2024-08-20

## 2024-08-22 ENCOUNTER — TRANSFERRED RECORDS (OUTPATIENT)
Dept: HEALTH INFORMATION MANAGEMENT | Facility: CLINIC | Age: 74
End: 2024-08-22

## 2024-09-12 ENCOUNTER — ANCILLARY PROCEDURE (OUTPATIENT)
Dept: CT IMAGING | Facility: CLINIC | Age: 74
End: 2024-09-12
Attending: NURSE PRACTITIONER
Payer: COMMERCIAL

## 2024-09-12 ENCOUNTER — ANCILLARY PROCEDURE (OUTPATIENT)
Dept: ULTRASOUND IMAGING | Facility: CLINIC | Age: 74
End: 2024-09-12
Attending: NURSE PRACTITIONER
Payer: COMMERCIAL

## 2024-09-12 ENCOUNTER — OFFICE VISIT (OUTPATIENT)
Dept: TRANSPLANT | Facility: CLINIC | Age: 74
End: 2024-09-12
Attending: NURSE PRACTITIONER
Payer: COMMERCIAL

## 2024-09-12 DIAGNOSIS — N18.6 END STAGE RENAL DISEASE (H): ICD-10-CM

## 2024-09-12 DIAGNOSIS — N18.9 ANEMIA DUE TO CHRONIC KIDNEY DISEASE: ICD-10-CM

## 2024-09-12 DIAGNOSIS — I77.82 ANCA-ASSOCIATED VASCULITIS (H): ICD-10-CM

## 2024-09-12 DIAGNOSIS — D63.1 ANEMIA DUE TO CHRONIC KIDNEY DISEASE: ICD-10-CM

## 2024-09-12 DIAGNOSIS — C67.2 MALIGNANT NEOPLASM OF LATERAL WALL OF URINARY BLADDER (H): Primary | ICD-10-CM

## 2024-09-12 DIAGNOSIS — Z76.82 ORGAN TRANSPLANT CANDIDATE: ICD-10-CM

## 2024-09-12 DIAGNOSIS — Z95.5 STENTED CORONARY ARTERY: ICD-10-CM

## 2024-09-12 DIAGNOSIS — Z99.2 PERITONEAL DIALYSIS CATHETER IN PLACE (H): ICD-10-CM

## 2024-09-12 DIAGNOSIS — Z01.818 ENCOUNTER FOR PRE-TRANSPLANT EVALUATION FOR KIDNEY TRANSPLANT: ICD-10-CM

## 2024-09-12 DIAGNOSIS — Z95.0 PRESENCE OF PERMANENT CARDIAC PACEMAKER: ICD-10-CM

## 2024-09-12 PROCEDURE — 99214 OFFICE O/P EST MOD 30 MIN: CPT | Performed by: TRANSPLANT SURGERY

## 2024-09-12 PROCEDURE — G0463 HOSPITAL OUTPT CLINIC VISIT: HCPCS | Performed by: TRANSPLANT SURGERY

## 2024-09-12 PROCEDURE — 71250 CT THORAX DX C-: CPT | Mod: GC | Performed by: RADIOLOGY

## 2024-09-12 PROCEDURE — 93978 VASCULAR STUDY: CPT | Mod: GC | Performed by: STUDENT IN AN ORGANIZED HEALTH CARE EDUCATION/TRAINING PROGRAM

## 2024-09-12 PROCEDURE — 74176 CT ABD & PELVIS W/O CONTRAST: CPT | Performed by: RADIOLOGY

## 2024-09-12 NOTE — LETTER
9/12/2024      Cesar Rashid  [address] [address]      Dear Colleague,    Thank you for referring your patient, Cesar Rashid, to the [facility] TRANSPLANT CLINIC. Please see a copy of my visit note below.    Surg eval KTx 75 yo man.  On PD for years.  One episode of peritonitis.  No vascular issues.  LE ulcers or DVT.  Minimal urine:  Had prior superficial bladder cancer (history: but no path in records).  No excision of bladder.      Recent MI, 2 stents, PM/defibrillator  Also a Amish, does not want blood.    Current Outpatient Medications   Medication Sig Dispense Refill     aspirin (ASA) 81 MG chewable tablet Take 81 mg by mouth daily       carvedilol (COREG) 6.25 MG tablet Take 6.25 mg by mouth 2 times daily (with meals)       levothyroxine (SYNTHROID/LEVOTHROID) 200 MCG tablet Take 1 tablet by mouth daily       levothyroxine (SYNTHROID/LEVOTHROID) 25 MCG tablet Take 1 tablet by mouth daily       sevelamer HCl (RENAGEL) 800 MG tablet Take 800 mg by mouth 3 times daily (with meals)       carvedilol (COREG) 12.5 MG tablet Take 12.5 mg by mouth 2 times daily (with meals)       lisinopril (ZESTRIL) 20 MG tablet Take 20 mg by mouth daily       Naltrexone HCl, Pain, 4.5 MG CAPS Take 4.5 mg by mouth daily       No current facility-administered medications for this visit.     There were no vitals taken for this visit.  Abd: soft, non-tender  Groin pulse ok    ECHO: Global and regional left ventricular function is normal with an EF of 55-60%.  Global right ventricular function is normal. The right ventricle is normal  size.  No significant valvular abnormalities present.  There is mild dilation at the level of the ascending aorta (4.2 cm, indexed  value 2.21 cm/m2).  IVC diameter <2.1 cm collapsing >50% with sniff suggests a normal RA pressure  of 3 mmHg.    CT: reviewed with patient.  OK landing sites, but has significant Ca in Ao and iliacs.  Had a stroke W/U 2019.  No  gross path identified    2/27/2020: The patient has a 3 cm papillary bladder tumor seen on recent cystoscopy in the clinic.  I would recommend a transurethral resection of the bladder tumor (TURBT) under anesthesia at [facility] in the near future.  I reviewed the procedure in detail with him    There is no prior study for direct comparison.    I had a long discussion with the patient regarding kidney transplantation which included but was not limited to the following points:  Kidney transplant evaluation process to assess whether (s)he can safely undergo a kidney transplant and take long-term immunosuppression.    The selection committee process was discussed.  The federal rules for cadaveric waiting list and blood type matching of the organ. The availability of living-related donor transplantation.  The types of donors: brain death donors, non-heart beating donors and living donor grafts  Extended criteria:  Donors and the increased risk of primary non-function using these extended criteria donors (KDPI being current measure of donor kidney quality).   [facility] high risk donors, risk of donor transmitted infections and donor transmitted malignancy  The kidney transplant operation and the associated risks and technical complications which can include intraoperative death, post-operative death, primary non-function, bleeding requiring re-operations, vascular and ureteral complications, bowel perforations and intra-abdominal abscess. Some of these complications may require a second operation.  The postoperative course and risk of postoperative complications which can include sepsis, MI, stroke, brain injury, pneumonia, pleural effusions, and renal dysfunction.  The current 1 year and 5 year graft and patient survivals.  The need for life long immunosuppressive therapy and the side effects of these medications, including the possibility of toxicity, opportunistic infections, risk of cancer including lymphoma, and  the possibility of rejection even if the patient is taking the medication exactly as prescribed.  The need for compliance with medications and follow-up visits in the clinic and thereafter.  The patient and family understand these risks and wish to proceed to transplantation    IP  Multiple comorbidites.  LDKT would be preferable if proceed.  He seems ok with current Rx.    No absolute surgical contraindications for patient.  However, the path report from 2020 needs to be reviewed, if not reviewing the slides.  Would get local urology opinion, even with neg cysto earlier this year.  Check PVR to make sure empties bladder.  Vessels appear ok.  No anticoagulation at this time.  Cardiac:  has stents, PM and implantable defibrillator.  Sign inc risk, leave to medical discretion  At 73 yo, need frailty and qol assessment..  He needs sufficient gFR to undergo surgical and immunosuppression risk.  Would be best with LD.  He has minimal prob with PD and finds QOL sufficient by his assessment.  Jehovah witness, unwilling to take blood, but until neoplastic and cardiac risks are clarified, did not address strategy to proceed with KT.  Briefly discussed cell saver, but he was unclear about txp in general.    45 min.  10 review, try and find op note and path. 25 min f2f, discussion, 10 documentation      Again, thank you for allowing me to participate in the care of your patient.        Sincerely,        José Miguel Foote MD

## 2024-09-16 NOTE — PROGRESS NOTES
Surg eval KTx 75 yo man.  On PD for years.  One episode of peritonitis.  No vascular issues.  LE ulcers or DVT.  Minimal urine:  Had prior superficial bladder cancer (history: but no path in records).  No excision of bladder.      Recent MI, 2 stents, PM/defibrillator  Also a Mosque, does not want blood.    Current Outpatient Medications   Medication Sig Dispense Refill    aspirin (ASA) 81 MG chewable tablet Take 81 mg by mouth daily      carvedilol (COREG) 6.25 MG tablet Take 6.25 mg by mouth 2 times daily (with meals)      levothyroxine (SYNTHROID/LEVOTHROID) 200 MCG tablet Take 1 tablet by mouth daily      levothyroxine (SYNTHROID/LEVOTHROID) 25 MCG tablet Take 1 tablet by mouth daily      sevelamer HCl (RENAGEL) 800 MG tablet Take 800 mg by mouth 3 times daily (with meals)      carvedilol (COREG) 12.5 MG tablet Take 12.5 mg by mouth 2 times daily (with meals)      lisinopril (ZESTRIL) 20 MG tablet Take 20 mg by mouth daily      Naltrexone HCl, Pain, 4.5 MG CAPS Take 4.5 mg by mouth daily       No current facility-administered medications for this visit.     There were no vitals taken for this visit.  Abd: soft, non-tender  Groin pulse ok    ECHO: Global and regional left ventricular function is normal with an EF of 55-60%.  Global right ventricular function is normal. The right ventricle is normal  size.  No significant valvular abnormalities present.  There is mild dilation at the level of the ascending aorta (4.2 cm, indexed  value 2.21 cm/m2).  IVC diameter <2.1 cm collapsing >50% with sniff suggests a normal RA pressure  of 3 mmHg.    CT: reviewed with patient.  OK landing sites, but has significant Ca in Ao and iliacs.  Had a stroke W/U 2019.  No gross path identified    2/27/2020: The patient has a 3 cm papillary bladder tumor seen on recent cystoscopy in the clinic.  I would recommend a transurethral resection of the bladder tumor (TURBT) under anesthesia at [facility] in the near  future.  I reviewed the procedure in detail with him    There is no prior study for direct comparison.    I had a long discussion with the patient regarding kidney transplantation which included but was not limited to the following points:  Kidney transplant evaluation process to assess whether (s)he can safely undergo a kidney transplant and take long-term immunosuppression.    The selection committee process was discussed.  The federal rules for cadaveric waiting list and blood type matching of the organ. The availability of living-related donor transplantation.  The types of donors: brain death donors, non-heart beating donors and living donor grafts  Extended criteria:  Donors and the increased risk of primary non-function using these extended criteria donors (KDPI being current measure of donor kidney quality).   The CDC high risk donors, risk of donor transmitted infections and donor transmitted malignancy  The kidney transplant operation and the associated risks and technical complications which can include intraoperative death, post-operative death, primary non-function, bleeding requiring re-operations, vascular and ureteral complications, bowel perforations and intra-abdominal abscess. Some of these complications may require a second operation.  The postoperative course and risk of postoperative complications which can include sepsis, MI, stroke, brain injury, pneumonia, pleural effusions, and renal dysfunction.  The current 1 year and 5 year graft and patient survivals.  The need for life long immunosuppressive therapy and the side effects of these medications, including the possibility of toxicity, opportunistic infections, risk of cancer including lymphoma, and the possibility of rejection even if the patient is taking the medication exactly as prescribed.  The need for compliance with medications and follow-up visits in the clinic and thereafter.  The patient and family understand these risks and wish  to proceed to transplantation    IP  Multiple comorbidites.  LDKT would be preferable if proceed.  He seems ok with current Rx.    No absolute surgical contraindications for patient.  However, the path report from 2020 needs to be reviewed, if not reviewing the slides.  Would get local urology opinion, even with neg cysto earlier this year.  Check PVR to make sure empties bladder.  Vessels appear ok.  No anticoagulation at this time.  Cardiac:  has stents, PM and implantable defibrillator.  Sign inc risk, leave to medical discretion  At 73 yo, need frailty and qol assessment..  He needs sufficient gFR to undergo surgical and immunosuppression risk.  Would be best with LD.  He has minimal prob with PD and finds QOL sufficient by his assessment.  Jehovah witness, unwilling to take blood, but until neoplastic and cardiac risks are clarified, did not address strategy to proceed with KT.  Briefly discussed cell saver, but he was unclear about txp in general.    45 min.  10 review, try and find op note and path. 25 min f2f, discussion, 10 documentation

## 2024-09-18 ENCOUNTER — TELEPHONE (OUTPATIENT)
Dept: TRANSPLANT | Facility: CLINIC | Age: 74
End: 2024-09-18
Payer: COMMERCIAL

## 2024-09-18 ENCOUNTER — PATIENT OUTREACH (OUTPATIENT)
Dept: NEPHROLOGY | Facility: CLINIC | Age: 74
End: 2024-09-18
Payer: COMMERCIAL

## 2024-09-18 DIAGNOSIS — Z76.82 ORGAN TRANSPLANT CANDIDATE: ICD-10-CM

## 2024-09-18 DIAGNOSIS — N18.6 END STAGE RENAL DISEASE (H): Primary | ICD-10-CM

## 2024-09-18 DIAGNOSIS — Z99.2 ANEMIA DUE TO CHRONIC KIDNEY DISEASE, ON CHRONIC DIALYSIS (H): ICD-10-CM

## 2024-09-18 DIAGNOSIS — N18.6 ANEMIA DUE TO CHRONIC KIDNEY DISEASE, ON CHRONIC DIALYSIS (H): ICD-10-CM

## 2024-09-18 DIAGNOSIS — I77.82 ANCA-ASSOCIATED VASCULITIS (H): ICD-10-CM

## 2024-09-18 DIAGNOSIS — D63.1 ANEMIA DUE TO CHRONIC KIDNEY DISEASE, ON CHRONIC DIALYSIS (H): ICD-10-CM

## 2024-09-18 NOTE — TELEPHONE ENCOUNTER
Called Rob to let him know his imaging was reviewed yesterday and vessels are fine for transplant but it appears his PD catheter has a leak. He said he never has any leakage on his dressing. Will notify his dialysis unit. Also, let him know we will do a chest CT annually to follow the nodules.    Dialysis unit at [place] notified.

## 2024-09-18 NOTE — PROGRESS NOTES
Vasculitis and Lupus Program Note: Patient Outreach Encounter    REASON FOR CALL:     REASON FOR CALL: Consult    SITUATION/BACKROUND:   Patient is being worked up for SOT kidney on Transplant who request GN team to eval MPO+ results.          ASSESSMENT:     Pre-transplant coordinator, Nalini Vargas sent message requesting consult.  Noted MPO+ results as well as UA/Protein Random   [phone]  Nurse Assessments:    Medications  Nephology medication reconciliation completed: Yes  Any barriers to taking medication(s) as prescribed?  No  Taking over the counter medication(s?) No    Current Outpatient Medications (Antihypertensive, Cardiovascular, Diuretics, Beta blockers, Calcium blockers, Anticoagulants)   Medication Sig    carvedilol (COREG) 12.5 MG tablet Take 12.5 mg by mouth 2 times daily (with meals)    carvedilol (COREG) 6.25 MG tablet Take 6.25 mg by mouth 2 times daily (with meals)    lisinopril (ZESTRIL) 20 MG tablet Take 20 mg by mouth daily     Current Outpatient Medications (Other)   Medication Sig    aspirin (ASA) 81 MG chewable tablet Take 81 mg by mouth daily    levothyroxine (SYNTHROID/LEVOTHROID) 200 MCG tablet Take 1 tablet by mouth daily  = 217mcg daily    levothyroxine (SYNTHROID/LEVOTHROID) 25 MCG tablet Take 1 tablet by mouth daily   = 217mcg daily    Naltrexone HCl, Pain, 4.5 MG CAPS Take 4.5 mg by mouth daily    sevelamer HCl (RENAGEL) 800 MG tablet Take 800 mg by mouth 3 times daily (with meals)         PLAN:     Follow Up:   Route to provider to further advise  Plan to follow up once reviewed on timing of visit.    Patient verbalized understanding and will follow up as recommended.    Suzette Morejon RN  Vasculitis and Lupus Program: [phone]

## 2024-09-20 ENCOUNTER — TELEPHONE (OUTPATIENT)
Dept: TRANSPLANT | Facility: CLINIC | Age: 74
End: 2024-09-20
Payer: COMMERCIAL

## 2024-09-20 DIAGNOSIS — R76.0 LUPUS ANTICOAGULANT POSITIVE: ICD-10-CM

## 2024-09-20 DIAGNOSIS — N18.6 ANEMIA DUE TO CHRONIC KIDNEY DISEASE, ON CHRONIC DIALYSIS (H): ICD-10-CM

## 2024-09-20 DIAGNOSIS — N18.6 END STAGE RENAL DISEASE (H): ICD-10-CM

## 2024-09-20 DIAGNOSIS — Z99.2 ANEMIA DUE TO CHRONIC KIDNEY DISEASE, ON CHRONIC DIALYSIS (H): ICD-10-CM

## 2024-09-20 DIAGNOSIS — I77.82 ANCA-ASSOCIATED VASCULITIS (H): ICD-10-CM

## 2024-09-20 DIAGNOSIS — D63.1 ANEMIA DUE TO CHRONIC KIDNEY DISEASE, ON CHRONIC DIALYSIS (H): ICD-10-CM

## 2024-09-20 DIAGNOSIS — Z76.82 ORGAN TRANSPLANT CANDIDATE: Primary | ICD-10-CM

## 2024-09-20 NOTE — PROGRESS NOTES
Update from On Call Vasculitis provider, Transplant Nephrology team to eval and update if additional support is needed.  MyChart sent to patient.  Referral not needed at this time.  Suzette Morejon RN, BSN, PHN  Vasculitis & Lupus Program Nephrology Nurse Navigator  [phone]

## 2024-09-20 NOTE — TELEPHONE ENCOUNTER
Spoke with Rob to get an update regarding his transplant evaluation. I did let him know Dr. Stoddard and our transplant nephrologists reviewed his evaluation results and he does not need to see Dr. Stoddard at this time. Rob let me know he has seen a dentist and had a colonoscopy, those records were requested. We discussed that our providers need to obtain his ENT records to ensure he doesn't have ongoing sinus issues, records were requested. He is aware he will need to sign release of information consents for requested records.    He will let me know once he has his cardiology appointment scheduled. He will have his cardiolipin and lupus anticoagulant panel redrawn around 10/21/24 at [facility] lab. He said he will discuss the Hepatitis B vaccine with his nephrologist and for any other recommended vaccinations including pneumovax and shingrix.     He touched base with his nephrologist regarding his PD catheter and no further follow-up or intervention is needed. A [employer] message was sent reviewing remaining items and I provided Rob my direct number for any questions/updates.

## 2024-09-20 NOTE — LETTER
Cesar Chapinensen  [address] [facility] [address]                September 20, 2024      Dental Clearance Form       Patient Name: Rob Rashid    YOB: 1950    Dentist Name _______________________________________________     Dental Office: [facility]     Patient's Last Dental Exam _____________________________________       I confirm that this patient has had a dental exam in the last 2 years and this patient does not have a medical condition that requires dental treatment.     Today's Date _______________________________________________     Dentist Signature _______________________________________________          Please fax completed form to our Transplant Office at [phone]. For questions, please call BIJU Gayle at [phone].

## 2024-09-23 ENCOUNTER — TRANSFERRED RECORDS (OUTPATIENT)
Dept: HEALTH INFORMATION MANAGEMENT | Facility: CLINIC | Age: 74
End: 2024-09-23
Payer: COMMERCIAL

## 2024-10-03 ENCOUNTER — TELEPHONE (OUTPATIENT)
Dept: TRANSPLANT | Facility: CLINIC | Age: 74
End: 2024-10-03
Payer: COMMERCIAL

## 2024-10-03 NOTE — TELEPHONE ENCOUNTER
Returned patient's wife's call and left a voicemail with my direct number for her to return my call.     Addendum: Pt's wife called back and I let her know the lab orders are in for Rob to have done at the end of October. She verbalized understanding with that. She will let me know when Rob has his next cardiology appointment so a cardiac risk assessment can be completed.

## 2024-10-10 ENCOUNTER — TELEPHONE (OUTPATIENT)
Dept: TRANSPLANT | Facility: CLINIC | Age: 74
End: 2024-10-10
Payer: COMMERCIAL

## 2024-10-10 NOTE — TELEPHONE ENCOUNTER
Called [facility] and left a message with the  for Armani Ngo NP. Writer asked if provider could please comment on whether or not they believe pt's most recent sinusitis in Jan 2024 was caused by an ANCA flare. Provided my direct number for a return call for questions and asked if the updated note could be faxed to our transplant office.

## 2024-10-25 ENCOUNTER — TELEPHONE (OUTPATIENT)
Dept: TRANSPLANT | Facility: CLINIC | Age: 74
End: 2024-10-25
Payer: COMMERCIAL

## 2024-10-25 DIAGNOSIS — C67.2 MALIGNANT NEOPLASM OF LATERAL WALL OF URINARY BLADDER (H): Primary | ICD-10-CM

## 2024-10-25 NOTE — TELEPHONE ENCOUNTER
Called [facility] and left a second message with the  for Armani Ngo NP. Writer asked if provider could please comment on whether or not they believe pt's most recent sinusitis in Jan 2024 was caused by an ANCA flare. Provided my direct number for a return call for questions and asked if the updated note could be faxed to our transplant office.

## 2024-11-05 ENCOUNTER — LAB (OUTPATIENT)
Dept: LAB | Facility: CLINIC | Age: 74
End: 2024-11-05
Payer: COMMERCIAL

## 2024-11-05 DIAGNOSIS — Z99.2 ANEMIA DUE TO CHRONIC KIDNEY DISEASE, ON CHRONIC DIALYSIS (H): ICD-10-CM

## 2024-11-05 DIAGNOSIS — N18.6 END STAGE RENAL DISEASE (H): ICD-10-CM

## 2024-11-05 DIAGNOSIS — D63.1 ANEMIA DUE TO CHRONIC KIDNEY DISEASE, ON CHRONIC DIALYSIS (H): ICD-10-CM

## 2024-11-05 DIAGNOSIS — R76.0 LUPUS ANTICOAGULANT POSITIVE: ICD-10-CM

## 2024-11-05 DIAGNOSIS — I77.82 ANCA-ASSOCIATED VASCULITIS (H): ICD-10-CM

## 2024-11-05 DIAGNOSIS — Z76.82 ORGAN TRANSPLANT CANDIDATE: ICD-10-CM

## 2024-11-05 DIAGNOSIS — N18.6 ANEMIA DUE TO CHRONIC KIDNEY DISEASE, ON CHRONIC DIALYSIS (H): ICD-10-CM

## 2024-11-05 LAB — TOTAL PROTEIN SERUM FOR ELP: 6.3 G/DL (ref 6.4–8.3)

## 2024-11-05 PROCEDURE — 84155 ASSAY OF PROTEIN SERUM: CPT

## 2024-11-05 PROCEDURE — 85390 FIBRINOLYSINS SCREEN I&R: CPT | Performed by: PATHOLOGY

## 2024-11-05 PROCEDURE — 85613 RUSSELL VIPER VENOM DILUTED: CPT

## 2024-11-05 PROCEDURE — 85730 THROMBOPLASTIN TIME PARTIAL: CPT

## 2024-11-05 PROCEDURE — 86146 BETA-2 GLYCOPROTEIN ANTIBODY: CPT | Performed by: STUDENT IN AN ORGANIZED HEALTH CARE EDUCATION/TRAINING PROGRAM

## 2024-11-05 PROCEDURE — 86147 CARDIOLIPIN ANTIBODY EA IG: CPT | Performed by: STUDENT IN AN ORGANIZED HEALTH CARE EDUCATION/TRAINING PROGRAM

## 2024-11-05 PROCEDURE — 36415 COLL VENOUS BLD VENIPUNCTURE: CPT

## 2024-11-05 PROCEDURE — 86334 IMMUNOFIX E-PHORESIS SERUM: CPT | Mod: 26 | Performed by: STUDENT IN AN ORGANIZED HEALTH CARE EDUCATION/TRAINING PROGRAM

## 2024-11-05 PROCEDURE — 84165 PROTEIN E-PHORESIS SERUM: CPT | Performed by: STUDENT IN AN ORGANIZED HEALTH CARE EDUCATION/TRAINING PROGRAM

## 2024-11-06 ENCOUNTER — LAB (OUTPATIENT)
Dept: LAB | Facility: CLINIC | Age: 74
End: 2024-11-06
Payer: COMMERCIAL

## 2024-11-06 DIAGNOSIS — R76.0 LUPUS ANTICOAGULANT POSITIVE: ICD-10-CM

## 2024-11-06 DIAGNOSIS — D63.1 ANEMIA DUE TO CHRONIC KIDNEY DISEASE, ON CHRONIC DIALYSIS (H): ICD-10-CM

## 2024-11-06 DIAGNOSIS — I77.82 ANCA-ASSOCIATED VASCULITIS (H): ICD-10-CM

## 2024-11-06 DIAGNOSIS — N18.6 ANEMIA DUE TO CHRONIC KIDNEY DISEASE, ON CHRONIC DIALYSIS (H): ICD-10-CM

## 2024-11-06 DIAGNOSIS — N18.6 END STAGE RENAL DISEASE (H): ICD-10-CM

## 2024-11-06 DIAGNOSIS — Z99.2 ANEMIA DUE TO CHRONIC KIDNEY DISEASE, ON CHRONIC DIALYSIS (H): ICD-10-CM

## 2024-11-06 DIAGNOSIS — Z76.82 ORGAN TRANSPLANT CANDIDATE: ICD-10-CM

## 2024-11-06 LAB
ALBUMIN SERPL ELPH-MCNC: 3.4 G/DL (ref 3.7–5.1)
ALPHA1 GLOB SERPL ELPH-MCNC: 0.4 G/DL (ref 0.2–0.4)
ALPHA2 GLOB SERPL ELPH-MCNC: 0.9 G/DL (ref 0.5–0.9)
B-GLOBULIN SERPL ELPH-MCNC: 0.8 G/DL (ref 0.6–1)
DRVVT CONFIRM NORMALIZED RATIO: 1.2
DRVVT SCREEN MIX RATIO: 1.17
DRVVT SCREEN RATIO: 1.55
GAMMA GLOB SERPL ELPH-MCNC: 0.8 G/DL (ref 0.7–1.6)
INR PPP: 1.14 (ref 0.85–1.15)
LA PPP-IMP: ABNORMAL
LOCATION OF TASK: ABNORMAL
LUPUS INTERPRETATION: ABNORMAL
M PROTEIN SERPL ELPH-MCNC: 0 G/DL
PROT PATTERN SERPL ELPH-IMP: ABNORMAL
PTT RATIO: 1.08
THROMBIN TIME: 19.9 SECONDS (ref 13–19)

## 2024-11-06 PROCEDURE — 84166 PROTEIN E-PHORESIS/URINE/CSF: CPT | Performed by: STUDENT IN AN ORGANIZED HEALTH CARE EDUCATION/TRAINING PROGRAM

## 2024-11-07 ENCOUNTER — TELEPHONE (OUTPATIENT)
Dept: TRANSPLANT | Facility: CLINIC | Age: 74
End: 2024-11-07
Payer: COMMERCIAL

## 2024-11-07 DIAGNOSIS — D47.2 MONOCLONAL PARAPROTEINEMIA: ICD-10-CM

## 2024-11-07 DIAGNOSIS — C67.2 MALIGNANT NEOPLASM OF LATERAL WALL OF URINARY BLADDER (H): Primary | ICD-10-CM

## 2024-11-07 DIAGNOSIS — Z76.82 ORGAN TRANSPLANT CANDIDATE: ICD-10-CM

## 2024-11-07 LAB
ALBUMIN MFR UR ELPH: 19.6 %
ALPHA1 GLOB MFR UR ELPH: 14 %
ALPHA2 GLOB MFR UR ELPH: 20.8 %
B-GLOBULIN MFR UR ELPH: 27.3 %
GAMMA GLOB MFR UR ELPH: 18.3 %
LOCATION OF TASK: ABNORMAL
M PROTEIN MFR UR ELPH: 3.7 %
PROT PATTERN UR ELPH-IMP: ABNORMAL

## 2024-11-07 NOTE — TELEPHONE ENCOUNTER
LVM for patient asking him to give me a call back regarding labs drawn on 11/5/24. Nephrology is requesting some follow-up labs to be drawn and has ordered them.

## 2024-11-08 LAB
B2 GLYCOPROT1 IGG SERPL IA-ACNC: 71 U/ML
B2 GLYCOPROT1 IGM SERPL IA-ACNC: <2.4 U/ML
CARDIOLIPIN IGG SER IA-ACNC: 35 GPL-U/ML
CARDIOLIPIN IGG SER IA-ACNC: ABNORMAL
CARDIOLIPIN IGM SER IA-ACNC: <2 MPL-U/ML
CARDIOLIPIN IGM SER IA-ACNC: NEGATIVE
LOCATION OF TASK: NORMAL
PROT PATTERN SERPL IFE-IMP: NORMAL

## 2024-11-08 NOTE — TELEPHONE ENCOUNTER
Rob called back and will have the additional lab drawn at [place] as soon as he is able. Pt needs to have a serum/urine immunofixation lab drawn. He had no further questions at this time.

## 2024-11-11 ENCOUNTER — LAB (OUTPATIENT)
Dept: LAB | Facility: CLINIC | Age: 74
End: 2024-11-11
Payer: COMMERCIAL

## 2024-11-11 DIAGNOSIS — Z76.82 ORGAN TRANSPLANT CANDIDATE: ICD-10-CM

## 2024-11-11 DIAGNOSIS — D47.2 MONOCLONAL PARAPROTEINEMIA: ICD-10-CM

## 2024-11-13 ENCOUNTER — LAB (OUTPATIENT)
Dept: LAB | Facility: CLINIC | Age: 74
End: 2024-11-13
Payer: COMMERCIAL

## 2024-11-13 PROCEDURE — 86335 IMMUNFIX E-PHORSIS/URINE/CSF: CPT | Performed by: PATHOLOGY

## 2024-11-14 LAB — PROT ELPH PNL UR ELPH: NORMAL

## 2024-11-15 ENCOUNTER — TELEPHONE (OUTPATIENT)
Dept: TRANSPLANT | Facility: CLINIC | Age: 74
End: 2024-11-15
Payer: COMMERCIAL

## 2024-11-15 DIAGNOSIS — N18.6 ANEMIA DUE TO CHRONIC KIDNEY DISEASE, ON CHRONIC DIALYSIS (H): ICD-10-CM

## 2024-11-15 DIAGNOSIS — Z99.2 ANEMIA DUE TO CHRONIC KIDNEY DISEASE, ON CHRONIC DIALYSIS (H): ICD-10-CM

## 2024-11-15 DIAGNOSIS — D63.1 ANEMIA DUE TO CHRONIC KIDNEY DISEASE, ON CHRONIC DIALYSIS (H): ICD-10-CM

## 2024-11-15 DIAGNOSIS — Z76.82 ORGAN TRANSPLANT CANDIDATE: ICD-10-CM

## 2024-11-15 DIAGNOSIS — N18.6 END STAGE RENAL DISEASE (H): ICD-10-CM

## 2024-11-15 DIAGNOSIS — R76.0 LUPUS ANTICOAGULANT POSITIVE: ICD-10-CM

## 2024-11-15 DIAGNOSIS — I77.82 ANCA-ASSOCIATED VASCULITIS (H): ICD-10-CM

## 2024-11-15 DIAGNOSIS — C67.2 MALIGNANT NEOPLASM OF LATERAL WALL OF URINARY BLADDER (H): Primary | ICD-10-CM

## 2024-11-15 NOTE — TELEPHONE ENCOUNTER
LVM for pt asking for him to return call to review previous lab draw results and follow-up needed. Provided my direct number for a return call.

## 2024-11-18 ENCOUNTER — TELEPHONE (OUTPATIENT)
Dept: TRANSPLANT | Facility: CLINIC | Age: 74
End: 2024-11-18
Payer: COMMERCIAL

## 2024-11-18 DIAGNOSIS — C67.2 MALIGNANT NEOPLASM OF LATERAL WALL OF URINARY BLADDER (H): Primary | ICD-10-CM

## 2024-11-18 NOTE — TELEPHONE ENCOUNTER
Called patient to let him know the transplant team would like him to see the bleeding and clotting clinic due to the results of his lupus anticoagulant and cardiolipin. Patient verbalized understanding. Pt said he sees cardiology on 12/04/2024 at [facility].

## 2025-01-09 ENCOUNTER — VIRTUAL VISIT (OUTPATIENT)
Dept: HEMATOLOGY | Facility: CLINIC | Age: 75
End: 2025-01-09
Attending: NURSE PRACTITIONER
Payer: COMMERCIAL

## 2025-01-09 VITALS — BODY MASS INDEX: 24.22 KG/M2 | WEIGHT: 166.4 LBS

## 2025-01-09 DIAGNOSIS — D63.1 ANEMIA DUE TO CHRONIC KIDNEY DISEASE, ON CHRONIC DIALYSIS (H): ICD-10-CM

## 2025-01-09 DIAGNOSIS — I77.82 ANCA-ASSOCIATED VASCULITIS (H): ICD-10-CM

## 2025-01-09 DIAGNOSIS — Z76.82 ORGAN TRANSPLANT CANDIDATE: ICD-10-CM

## 2025-01-09 DIAGNOSIS — R76.0 LUPUS ANTICOAGULANT POSITIVE: ICD-10-CM

## 2025-01-09 DIAGNOSIS — N18.6 ANEMIA DUE TO CHRONIC KIDNEY DISEASE, ON CHRONIC DIALYSIS (H): ICD-10-CM

## 2025-01-09 DIAGNOSIS — N18.6 END STAGE RENAL DISEASE (H): ICD-10-CM

## 2025-01-09 DIAGNOSIS — Z99.2 ANEMIA DUE TO CHRONIC KIDNEY DISEASE, ON CHRONIC DIALYSIS (H): ICD-10-CM

## 2025-01-09 NOTE — PROGRESS NOTES
Patient was contacted to complete the pre-visit call prior to their telephone visit with the provider.     Allergies and medications were reviewed.     I thanked them for their time to cover this information.     Bertha Celaya MA

## 2025-01-09 NOTE — PROGRESS NOTES
[facility]  [address] [place] [id number]  Phone: [phone], Fax: [phone]    Outpatient Visit Note:    Patient: Cesar Rashid  MRN: [id number]  : 1950  ROXANNE: 2025     Reason for Consultation:  Pos APS test, future kidney transplant    Assessment: Cesar Rashid is a 74 year old man with a history of CAD, ANCA vasculitis and ESRD, bladder cancer, with seropositivity for ALIA IgG and B2GPI IgG without a history of venous thrombosis.  Overall, this is not changed his risk for thrombosis whether that DVT or graft thrombosis.  I would recommend Serendip care postoperative DVT prophylaxis.    Anticardiolipin IgG antibodies are the least specific antibodies for antiphospholipid syndrome and are quite common.  The presence of both beta-2 glycoprotein 1 antibodies and cardiolipin antibodies may also just simply be due to the autoimmune nature of these antibodies and his prior history of ANCA vasculitis will resee a fair amount of overlap and autoimmunity.  My interpretation of his lupus inhibitor testing is that its negative (technically read as possible, but does not fit the pattern we do expect for a lupus inhibitor).  Most importantly, he has no personal history of VTE so does not meet the criteria for antiphospholipid syndrome.    Recommendations:  I counseled the patient about my assessment of results of his APS testing as above  This should not be a barrier to transplant.  The presence of cardiolipin antibodies and beta-2 micro protein 1 antibodies in the absence of a clinical thrombotic event or other manifestation of APS is of no clinical significance so he should receive should receive standard of care VTE prophylaxis after kidney transplant.    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    Overall, he reports he is doing well.  He has no personal history of VTE.  His daughter is with him today.    Current Outpatient Medications   Medication Sig Dispense Refill    aspirin (ASA) 81 MG chewable tablet Take 81 mg by mouth daily      levothyroxine (SYNTHROID/LEVOTHROID) 200 MCG tablet Take 1 tablet by mouth daily      levothyroxine (SYNTHROID/LEVOTHROID) 25 MCG tablet Take 1 tablet by mouth daily      Naltrexone HCl, Pain, 4.5 MG CAPS Take 4.5 mg by mouth daily      sevelamer HCl (RENAGEL) 800 MG tablet Take 800 mg by mouth 3 times daily (with meals)      carvedilol (COREG) 12.5 MG tablet Take 12.5 mg by mouth 2 times daily (with meals)      carvedilol (COREG) 6.25 MG tablet Take 6.25 mg by mouth 2 times daily (with meals)      lisinopril (ZESTRIL) 20 MG tablet Take 20 mg by mouth daily       No current facility-administered medications for this visit.       Physical Exam:  Exam:  Body mass index is 24.22 kg/m .   Constitutional: Appears well, no distress  Eyes: no discharge, injection or icterus  Respiratory: no cough or labored breathing  Skin: no rashes or petechiae  Neurological: no deficits appreciated, speech is fluent  Psych: affect is normal    Labs:   Latest Reference Range & Units 07/29/24 14:01 11/05/24 14:20   Cardiolipin IgG Anay Negative  Weak Positive ! Weak Positive !   Cardiolipin IgM Anay Negative  Negative Negative   Cardiolipin Anay IgG Instrument Value <10.0 GPL-U/mL 34.0 (H) 35.0 (H)   Cardiolipin Anay IgM Instrument Value <10.0 MPL-U/mL <2.0 <2.0   Beta 2 Glycoprotein 1 Antibody  IgG <7.0 U/mL  71.0 (H)   Beta 2 Glycoprotein 1 Antibody IgM <7.0 U/mL  <2.4   !: Data is abnormal  (H): Data is abnormally high    Lupus inhibitor testing:  Final Interpretation     INHIBITOR DETECTED, LUPUS ANTICOAGULANT POSSIBLE.     Recommendations     DRVVT Screen ratio, DRVVT Confirm Normalized ratio and DRVVT Screen Mix ratio  are consistent with an inhibitor, but does not confirm with this testing to be a lupus anticoagulant.     Imaging:  None

## 2025-01-14 ENCOUNTER — TELEPHONE (OUTPATIENT)
Dept: TRANSPLANT | Facility: CLINIC | Age: 75
End: 2025-01-14
Payer: COMMERCIAL

## 2025-01-14 DIAGNOSIS — C67.2 MALIGNANT NEOPLASM OF LATERAL WALL OF URINARY BLADDER (H): Primary | ICD-10-CM

## 2025-01-14 NOTE — LETTER
Cesar Rashid  [address] [facility] [address]                January 14, 2025      Dear Dental Provider,     You may or may not know that the above patient in your care is in evaluation for an organ transplant. In order to be a candidate for transplant, our center requests that the patient provide a letter of clearance from their dentist stating that they are free from disease or infections. At your earliest convenience, please fax this information to [phone]. Your help is greatly appreciated.    Sincerely,   Nalini Vargas RN; Pre Kidney/Pancreas Transplant Coordinator  Direct Number: [phone]      Dental Clearance Form       Patient Name: Rob Rashid    YOB: 1950    Dentist Name: _______________________________________________     Dental Office: [facility]    Patient's Last Dental Exam: _____________________________________     I confirm that this patient has had a dental exam in the last 2 years and this patient does not have a medical condition that requires dental treatment.     Today's Date: _______________________________________________     Dentist Signature: _______________________________________________

## 2025-01-15 ENCOUNTER — COMMITTEE REVIEW (OUTPATIENT)
Dept: TRANSPLANT | Facility: CLINIC | Age: 75
End: 2025-01-15
Payer: COMMERCIAL

## 2025-01-15 DIAGNOSIS — C67.2 MALIGNANT NEOPLASM OF LATERAL WALL OF URINARY BLADDER (H): Primary | ICD-10-CM

## 2025-01-15 NOTE — TELEPHONE ENCOUNTER
Called [facility] and left a message with the  for Armani Ngo NP. Writer asked if provider could please comment on whether or not they believe pt's most recent sinusitis in Jan 2024 was caused by an ANCA flare. Provided my direct number for a return call for questions and asked if the updated note could be faxed to our transplant office.

## 2025-01-16 ENCOUNTER — TELEPHONE (OUTPATIENT)
Dept: TRANSPLANT | Facility: CLINIC | Age: 75
End: 2025-01-16
Payer: COMMERCIAL

## 2025-01-16 DIAGNOSIS — C67.2 MALIGNANT NEOPLASM OF LATERAL WALL OF URINARY BLADDER (H): Primary | ICD-10-CM

## 2025-01-16 NOTE — LETTER
01/16/25        Cesar Rashid  [address] [address]        Dear Cesar,    It was a pleasure to see you recently for consideration of kidney transplantation. Your pre-transplant evaluation results were reviewed at our Multidisciplinary Selection Committee on 01/15/2025. The committee is requesting the following items are completed before determining your candidacy:    Cardiac Stress Test and the transplant team recommends you are taking a statin due to CAD history.   ENT's input regarding previous sinusitis symptoms and whether or not they believe flares were related to an ANCA flare.   We are asking that you have a list of blood products you are not willing and are willing to receive.        For any questions, please contact the Transplant Office at [phone]. My direct number is [phone].       Sincerely,  Nalini Vargas RN; Pre Kidney Transplant Coordinator     Solid Organ Transplant  [facility]

## 2025-01-16 NOTE — TELEPHONE ENCOUNTER
Contacted patient to review outcome of selection committee meeting (See selection committee encounter).   Explained to patient that he/she needs to complete all components of the evaluation to be eligible for active status on the waiting list or to proceed with a live donor kidney transplant.   Reviewed next steps based on outcomes:   Patient will not be listed (patient is on dialysis and evaluation is not complete), patient will receive:    - An Evaluation Summary Letter indicating what is needed to complete evaluation-discussed with patient if they would like to have testing done with [facility] or locally  Confirmed with patient that on successful completion of outstanding components, patient is eligible for active status and they will receive a follow-up call.   Confirmed that patient has contact information for additional questions or concerns.      Called [facility] and left a message asking if patient could have an exercise stress test as part of his cardiac risk assessment. Confirmed their fax number as well and faxed order for stress test.

## 2025-01-16 NOTE — COMMITTEE REVIEW
Kidney/Pancreas Committee Review Note     Evaluation Date: 7/29/2024  Committee Review Date: 1/15/2025    Organ being evaluated for: Kidney    Transplant Phase: Evaluation  Transplant Status: Active    Transplant Coordinator: Nalini Vargas  Transplant Surgeon:  Dr. Foote      Referring Physician: Loreta Valdivia    Primary Diagnosis: MPO ANCA Vasculitis  Secondary Diagnosis:     Committee Review Members:  Nephrology Tracee Nixon MD, Marcelo Lennon MD   Nutrition Zoila Thomas, JN   Pharmacist Yvette Barraza, [facility]    - Clinical FAITH Masterson, Ofelia Recio, [facility]   Transplant LINDA AHUMADA, RN, Laura Resendiz, RN, Radha Neal, RN, MARY ELLEN Guerrero CNP, Brenda Hernandez, RN, Rhina Schuler, BIJ[facility], Kim Silver, BIJU, Katy Goldsmith, BIJU, Nalini Vargas, RN   Transplant Surgery Myranda Batista MD, MD       Transplant Eligibility: Irreversible chronic kidney disease treated w/dialysis or expected need for dialysis    Committee Review Decision: Needs Re-presentation    Relative Contraindications: None    Absolute Contraindications: None     Committee Chair Myranda Batista MD verbally attested to the committee's decision.    Committee Discussion Details: Reviewed pt's medical status and evaluation results to date with multidisciplinary committee.    -Reviewed PAD screening has been reviewed and vessels suitable for transplant.   -Reviewed hx of noninvasive urothelial carcinoma s/p TURBT and pt had a negative cysto done 08/2024. No further testing needed at this time.   -Reviewed pulm nodule hx since 2019 and pt had a CT Chest done 09/2024 and recommended follow-up CT in 12 months.   -Reviewed bleeding and clotting visit for indeterminate lupus anticoagulant and weak positive cardiolipin. Bleeding and clotting recommends pt to receive standard of care for VTE prophylaxis following kidney transplant.   -Reviewed health maintenance is UTD.     Recommended the following  evaluation items:    Cardiology: Needs an exercise stress test if able to exercise or a lexiscan. Patient should be on a statin.    ENT: Need to follow-up with ENT and Dr. Loreta Valdivia, Nephrology to determine if ANCA flare was cause of sinusitis symptoms in 03/2023 and 12/2023.   Confucianist: Pt will need to bring in a list of blood products he is willing to take and not willing to take. Does not need a follow-up with transplant surgery at this time to review these unless pt requests to meet with transplant surgery.     Patient should have live donors register now to initiate donor evaluation: Yes    Committee determined that patient is a Good candidate for Kidney     Listing plan: Needs Representation     A2B Candidate: No    Patient will be called and summary letter will be sent.

## 2025-01-16 NOTE — LETTER
PHYSICIAN ORDERS      DATE & TIME ISSUED: 2025 12:28 PM  PATIENT NAME: Cesar Rashid   : 1950     [facility] MR# [if applicable]: [id number]     DIAGNOSIS:  Awaiting Organ Transplant  ICD-10 CODE: Z76.82     As part of this patient's transplant evaluation, the transplant team is requesting the following:   - Echocardiogram Exercise Stress Test     Any questions please call: Nalini Vargas, Pre-Kidney Transplant Coordinator at [phone].         Myranda Batista MD FACS  Associate Professor of Surgery  Director, Living Kidney Donor Program.

## 2025-01-23 ENCOUNTER — MEDICAL CORRESPONDENCE (OUTPATIENT)
Dept: HEALTH INFORMATION MANAGEMENT | Facility: CLINIC | Age: 75
End: 2025-01-23

## 2025-01-27 ENCOUNTER — TELEPHONE (OUTPATIENT)
Dept: TRANSPLANT | Facility: CLINIC | Age: 75
End: 2025-01-27
Payer: COMMERCIAL

## 2025-01-27 DIAGNOSIS — C67.2 MALIGNANT NEOPLASM OF LATERAL WALL OF URINARY BLADDER (H): Primary | ICD-10-CM

## 2025-01-27 NOTE — TELEPHONE ENCOUNTER
Dr. Stoner's assistant called to get more information regarding what information our transplant team is requesting. She said she will review this with Dr. Stoner and call once she has more information.

## 2025-01-27 NOTE — TELEPHONE ENCOUNTER
Called [facility] in [place], [place] and left a detailed message with their  requesting a call back. Explained that our transplant team is hoping for the ENT providers input regarding whether or not they believe patient's sinusitis symptoms have been a result of an ANCA flare or not. Provided my direct number for their office to call with their input or if they have any additional questions.    Detail Level: Zone

## 2025-01-28 NOTE — TELEPHONE ENCOUNTER
Zoila from [facility] returned call after speaking with Dr. Stoner. Per Dr. Stoner, he does not have any additional information about ANCA vasculitis other than that some types can cause sinusitis symptoms. Dr. Stoner is not able to say if he believes Rob's ANCA caused his sinusitis symptoms.

## 2025-01-29 ENCOUNTER — TELEPHONE (OUTPATIENT)
Dept: TRANSPLANT | Facility: CLINIC | Age: 75
End: 2025-01-29
Payer: COMMERCIAL

## 2025-01-29 DIAGNOSIS — C67.2 MALIGNANT NEOPLASM OF LATERAL WALL OF URINARY BLADDER (H): Primary | ICD-10-CM

## 2025-01-29 NOTE — TELEPHONE ENCOUNTER
Spoke with patient's PD nurse at [facility] in [place]. Left a message requesting for Dr. Valdivia to call me to discuss patient's history of ANCA and whether or not she believes an ANCA flare could be causing patient's sinusitis symptoms. Provided my direct number for a return call.

## 2025-02-13 ENCOUNTER — TELEPHONE (OUTPATIENT)
Dept: TRANSPLANT | Facility: CLINIC | Age: 75
End: 2025-02-13
Payer: COMMERCIAL

## 2025-02-13 DIAGNOSIS — C67.2 MALIGNANT NEOPLASM OF LATERAL WALL OF URINARY BLADDER (H): Primary | ICD-10-CM

## 2025-02-13 NOTE — TELEPHONE ENCOUNTER
Spoke with patient's nephrologist, Dr. 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 Dr. Valdivia had referred patient to ENT due to his chronic sinus issues and concern for an ANCA flare. Dr. Valdivia sad she has spoke to his ENT provider numerous times and there was never a concern for active vasculitis and has never been a need for treatment of vasculitis in regards to his sinus symptoms.     Dr. Valdivia did discuss her concerns about patient's willingness to take new medications. Prior to her being his nephrologist and prior to dialysis, pt did not want any treatment for ANCA vasculitis other than Prednisone.

## 2025-02-17 ENCOUNTER — TELEPHONE (OUTPATIENT)
Dept: TRANSPLANT | Facility: CLINIC | Age: 75
End: 2025-02-17
Payer: COMMERCIAL

## 2025-02-17 DIAGNOSIS — D63.1 ANEMIA DUE TO CHRONIC KIDNEY DISEASE, ON CHRONIC DIALYSIS (H): ICD-10-CM

## 2025-02-17 DIAGNOSIS — N18.6 END STAGE RENAL DISEASE (H): ICD-10-CM

## 2025-02-17 DIAGNOSIS — Z99.2 ANEMIA DUE TO CHRONIC KIDNEY DISEASE, ON CHRONIC DIALYSIS (H): ICD-10-CM

## 2025-02-17 DIAGNOSIS — I77.82 ANCA-ASSOCIATED VASCULITIS (H): ICD-10-CM

## 2025-02-17 DIAGNOSIS — N18.6 ANEMIA DUE TO CHRONIC KIDNEY DISEASE, ON CHRONIC DIALYSIS (H): ICD-10-CM

## 2025-02-17 DIAGNOSIS — Z76.82 ORGAN TRANSPLANT CANDIDATE: Primary | ICD-10-CM

## 2025-02-18 NOTE — TELEPHONE ENCOUNTER
Spoke with Rob to provide an update in his transplant evaluation status. Rob completed a stress test on  on 1/23/25. Pt has not started a statin but is taking citrus bergamot instead of this. Informed the pt this will need to be reviewed by the transplant team as he most likely will need to stop herbal medicines/supplements at the time of transplant unless told otherwise by the transplant team. Pt stated the previous statin he was on made him feel like he had a stroke. Pt will call his cardiologist's office to get their recommendations on changing to an alternative statin.     Discussed with pt that our transplant team would like him to have a follow-up with transplant nephrology to confirm his willingness to take all post-transplant medications as Dr. Valdivia did mention patient has been hesitant to take medications in the past. Pt said he does not have any problem taking the post-transplant medications and understands he will be on them for the rest of his life.

## 2025-02-24 NOTE — PROGRESS NOTES
TRANSPLANT NEPHROLOGY WAITLIST VISIT    Assessment and Plan:  # Kidney Transplant Wait List Evaluation: Patient is a fair candidate overall. Patient is still in evaluation phase. Could be considered for active status as all prior pending items are complete, however, will need to review recent stress test with committee.     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 Refused a kidney biopsy. HD initiation 7/17/20 since changed to PD which is going well.  Repeat MPO titer from 7/2024 decreased/stable ~ 58. Primary nephrologist Dr. Valdivia does believe his disease is quiescent. He may benefit from a kidney transplant.       # Cardiac Risk: H/o STEMI and VF cardiac arrest 9/6/23 s/p shockwave lithotripsy and SURESH to RCA and SURESH to RPLA, ischemic cardiomyopathy with LVEF of 45%. During his hospital stay he had an in-hospital VT arrest. Repeat coronary angio was completed that showed no evidence of In Stent restenosis. S/p ICD placement.     - outside 11/2024 ECHO showed normal LVEF ~ 65%, no WMAs and no significant valvular abnormalities present.     - outside 1/23/2025 stress test achieved 82 % of maximum predicted HR. Results showed that mid to distal inferior wall(s) were hypokinetic post stress, with more hypokinesis in a larger territory (basal to mid) compared to the rest images (no prior study to compare to).    May need coronary angiogram with mild concerning findings on stress test above, however, will await final decision by committee.      # PAD Screening: scattered disease on 2019 CT. Refer to surgery for updated imaging.      # Chronic frontal sinusitis: stable and followed by ENT, managing with sinus rinses.      # Baptism: patient will not accept blood transfusions but will accept cell saver.     # History of noncompliance: outside cardiology documentation mentions patient has self-stopped medications in the past. No concerns about  compliance at this visit. Patient knows his medications and has good blood pressure control. Provided education on the importance post transplant compliance. Appreciate social work input as well.      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      Path showed:   A.  Bladder, bladder tumor, transurethral resection:    Non-invasive low grade papillary urothelial carcinoma  Muscularis propria is present There was no evidence of muscle invasion.      No evidence of recurrence on 2024 cytoscopy.      # Pulmonary nodules: sub 6-mm and benign appearing on 2024 CT chest. Recommend 12 month repeat in 2025.     # Beta 2 glycoprotein 1 antibody +:  no history of clotting. See Dr. Haque's note from 2025 for DVT prophylaxis recs.      # Health Maintenance: 2024 Colonoscopy reportedly up to date (need records) and Dental: UTD     Discussed the risks and benefits of a transplant, including the risk of surgery and immunosuppression medications.    KDPI: We discussed approximate remaining wait time and how that is influenced by issues such as blood type and sensitization (PRA) and access to a living donor. I contrasted potential waiting time for living vs  donor kidneys from  normal (0-85%) or higher (%) kidney donor profile index (KDPI) donors and their associated outcomes. I would recommend Mr. Rashid to consider kidneys from high KDPI donors. The reason for this decision is best summarized as: decreased dialysis related morbidity/mortality, accepting lower kidney graft survival rates.    Patient presently appears to be enough of an acceptable kidney transplant recipient candidate to have any potential kidney donors start the evaluation process.  Patient s overall re-evaluation may require further discussion in the Transplant Program s multidisciplinary selection committee for a final recommendation on the patient s suitability for transplant.     Reason for Visit:  Cesar Schuler  "Rachid is a 74 year old male with ESKD from MPO ANCA vasculitis, who presents for kidney transplant wait list evaluation.     Date of Initial Transplant Evaluation:  7/2024         Current Transplant Phase: Evaluation: Active  Official UNOS Listing Date:    Blood Type: A        cPRA: 0%       Date of cPRA: 7/2024   Transplant Coordinator: Nalini Vargas Transplant Office phone number [phone]     Previous Medical Issues:       History of Present Illness:            Interim Events: reports he tested positive for COVID a few weeks ago but remained stable and did not require hosptilaization. Has a mild residual cough but otherwise back to baseline.            Kidney Disease:        Kidney Disease Dx:  MPO ANCA vasculitis       On Dialysis: Yes, Date initiated: 2020 and Dialysis Type: Home HD;, no episode of peritonitis, BPs stable averaging ~ 130s/70s       Primary Nephrologist: Dr. Valdivia          Cardiac Disease History:  H/o STEMI and VF cardiac arrest 9/6/23 s/p shockwave lithotripsy and SURESH to RCA and SURESH to RPLA, ischemic cardiomyopathy with LVEF of 45%. During his hospital stay he had an in-hospital VT arrest. Repeat coronary angio was completed that showed no evidence of In Stent restenosis. S/p ICD placement.     - outside 11/2024 ECHO showed normal LVEF ~ 65%, no WMAs and no significant valvular abnormalities present.     - outside 1/23/2025 stress test achieved 82 % of maximum predicted HR. Results showed that mid to distal inferior wall(s) were hypokinetic post stress, with more hypokinesis in a larger territory (basal to mid) compared to the rest images (no prior study to compare to).             Functional Capacity/Frailty:       . 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 States he can \"can walk as far as he wants\".  No exertional symptoms.     # COVID Vaccination Up To Date: Not asked      Other Pertinent Transplant Surgical Issues:  Recent Blood " Transfusion: No  Previous Abdominal Transplant: No  Bladder Dysfunction: No  Chronic/Recurrent Infections: No  Chronic Anticoagulation: No  Jehovah s Witness: No       Active Problem List:   Patient Active Problem List   Diagnosis    ESRD on peritoneal dialysis (H)    Acute renal failure    Acute ST elevation myocardial infarction (STEMI) of inferior wall (H)    Anemia due to chronic kidney disease    Atherosclerosis of coronary artery    Bladder mass    Bradycardia, sinus    Dyslipidemia    ESRD (end stage renal disease) (H)    History of COVID-19    Ischemic cardiomyopathy    Malignant neoplasm of lateral wall of urinary bladder (H)    Pulmonary nodule    Procedure and treatment not carried out because of patient's decision for reasons of belief and group pressure    Status post right hip replacement    Stented coronary artery    VF (ventricular fibrillation) (H)    ANCA-associated vasculitis (H)       Personal History:  Nonsmoker      Allergies:  Allergies   Allergen Reactions    Amlodipine Palpitations        Medications:  Current Outpatient Medications   Medication Sig Dispense Refill    aspirin (ASA) 81 MG chewable tablet Take 81 mg by mouth daily      carvedilol (COREG) 12.5 MG tablet Take 12.5 mg by mouth 2 times daily (with meals)      carvedilol (COREG) 6.25 MG tablet Take 6.25 mg by mouth 2 times daily (with meals)      levothyroxine (SYNTHROID/LEVOTHROID) 200 MCG tablet Take 1 tablet by mouth daily      levothyroxine (SYNTHROID/LEVOTHROID) 25 MCG tablet Take 1 tablet by mouth daily      lisinopril (ZESTRIL) 20 MG tablet Take 20 mg by mouth daily      Naltrexone HCl, Pain, 4.5 MG CAPS Take 4.5 mg by mouth daily      sevelamer HCl (RENAGEL) 800 MG tablet Take 800 mg by mouth 3 times daily (with meals)       No current facility-administered medications for this visit.        Vitals:  There were no vitals taken for this visit.     Exam:  GENERAL APPEARANCE: alert and no distress  HENT: no obvious  abnormalities on appearance  RESP: breathing appears unremarkable with normal rate, no audible wheezing or cough and no apparent shortness of breath with conversation  MS: extremities normal - no gross deformities noted  SKIN: no apparent rash and normal skin tone  NEURO: speech is clear with no obvious neurological deficits  PSYCH: mentation appears normal and affect normal        40 minutes spent on the date of the encounter doing chart review, history and exam, documentation and further activities per the note.

## 2025-02-25 ENCOUNTER — VIRTUAL VISIT (OUTPATIENT)
Dept: TRANSPLANT | Facility: CLINIC | Age: 75
End: 2025-02-25
Attending: NURSE PRACTITIONER
Payer: COMMERCIAL

## 2025-02-25 VITALS
WEIGHT: 162 LBS | SYSTOLIC BLOOD PRESSURE: 135 MMHG | BODY MASS INDEX: 23.19 KG/M2 | DIASTOLIC BLOOD PRESSURE: 72 MMHG | HEIGHT: 70 IN

## 2025-02-25 DIAGNOSIS — I77.82 ANCA-ASSOCIATED VASCULITIS (H): ICD-10-CM

## 2025-02-25 DIAGNOSIS — Z99.2 ANEMIA DUE TO CHRONIC KIDNEY DISEASE, ON CHRONIC DIALYSIS (H): ICD-10-CM

## 2025-02-25 DIAGNOSIS — Z76.82 ORGAN TRANSPLANT CANDIDATE: ICD-10-CM

## 2025-02-25 DIAGNOSIS — D63.1 ANEMIA DUE TO CHRONIC KIDNEY DISEASE, ON CHRONIC DIALYSIS (H): ICD-10-CM

## 2025-02-25 DIAGNOSIS — N18.6 ANEMIA DUE TO CHRONIC KIDNEY DISEASE, ON CHRONIC DIALYSIS (H): ICD-10-CM

## 2025-02-25 DIAGNOSIS — C67.2 MALIGNANT NEOPLASM OF LATERAL WALL OF URINARY BLADDER (H): ICD-10-CM

## 2025-02-25 DIAGNOSIS — N18.6 END STAGE RENAL DISEASE (H): Primary | ICD-10-CM

## 2025-02-25 DIAGNOSIS — I15.1 HYPERTENSION SECONDARY TO OTHER RENAL DISORDERS: ICD-10-CM

## 2025-02-25 PROCEDURE — 98003 SYNCH AUDIO-VIDEO NEW HI 60: CPT | Performed by: NURSE PRACTITIONER

## 2025-02-25 ASSESSMENT — PAIN SCALES - GENERAL: PAINLEVEL_OUTOF10: NO PAIN (0)

## 2025-02-25 NOTE — LETTER
2/25/2025      Cesar Rashid  [address] [facility] [address]      Dear Colleague,    Thank you for referring your patient, Cesar Rashid, to the [facility]. Please see a copy of my visit note below.    TRANSPLANT NEPHROLOGY WAITLIST VISIT    Assessment and Plan:  # Kidney Transplant Wait List Evaluation: Patient is a fair candidate overall. Patient is still in evaluation phase. Could be considered for active status as all prior pending items are complete, however, will need to review recent stress test with committee.     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 Refused a kidney biopsy. HD initiation 7/17/20 since changed to PD which is going well.  Repeat MPO titer from 7/2024 decreased/stable ~ 58. Primary nephrologist Dr. Valdivia does believe his disease is quiescent. He may benefit from a kidney transplant.       # Cardiac Risk: H/o STEMI and VF cardiac arrest 9/6/23 s/p shockwave lithotripsy and SURESH to RCA and SURESH to RPLA, ischemic cardiomyopathy with LVEF of 45%. During his hospital stay he had an in-hospital VT arrest. Repeat coronary angio was completed that showed no evidence of In Stent restenosis. S/p ICD placement.     - outside 11/2024 ECHO showed normal LVEF ~ 65%, no WMAs and no significant valvular abnormalities present.     - outside 1/23/2025 stress test achieved 82 % of maximum predicted HR. Results showed that mid to distal inferior wall(s) were hypokinetic post stress, with more hypokinesis in a larger territory (basal to mid) compared to the rest images (no prior study to compare to).    May need coronary angiogram with mild concerning findings on stress test above, however, will await final decision by committee.      # PAD Screening: scattered disease on 2019 CT. Refer to surgery for updated imaging.      # Chronic frontal sinusitis: stable and followed by ENT, managing with sinus  rinses.      # Holiness: patient will not accept blood transfusions but will accept cell saver.     # History of noncompliance: outside cardiology documentation mentions patient has self-stopped medications in the past. No concerns about compliance at this visit. Patient knows his medications and has good blood pressure control. Provided education on the importance post transplant compliance. Appreciate social work input as well.      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      Path showed:   A.  Bladder, bladder tumor, transurethral resection:    Non-invasive low grade papillary urothelial carcinoma  Muscularis propria is present There was no evidence of muscle invasion.      No evidence of recurrence on 2024 cytoscopy.      # Pulmonary nodules: sub 6-mm and benign appearing on 2024 CT chest. Recommend 12 month repeat in 2025.     # Beta 2 glycoprotein 1 antibody +:  no history of clotting. See Dr. Haque's note from 2025 for DVT prophylaxis recs.      # Health Maintenance: 2024 Colonoscopy reportedly up to date (need records) and Dental: UTD     Discussed the risks and benefits of a transplant, including the risk of surgery and immunosuppression medications.    KDPI: We discussed approximate remaining wait time and how that is influenced by issues such as blood type and sensitization (PRA) and access to a living donor. I contrasted potential waiting time for living vs  donor kidneys from  normal (0-85%) or higher (%) kidney donor profile index (KDPI) donors and their associated outcomes. I would recommend Mr. Rashid to consider kidneys from high KDPI donors. The reason for this decision is best summarized as: decreased dialysis related morbidity/mortality, accepting lower kidney graft survival rates.    Patient presently appears to be enough of an acceptable kidney transplant recipient candidate to have any potential kidney donors start the  evaluation process.  Patient s overall re-evaluation may require further discussion in the Transplant Program s multidisciplinary selection committee for a final recommendation on the patient s suitability for transplant.     Reason for Visit:  Cesar Rashid is a 74 year old male with ESKD from MPO ANCA vasculitis, who presents for kidney transplant wait list evaluation.     Date of Initial Transplant Evaluation:  7/2024         Current Transplant Phase: Evaluation: Active  Official UNOS Listing Date:    Blood Type: A        cPRA: 0%       Date of cPRA: 7/2024   Transplant Coordinator: Nalini Vargas Transplant Office phone number [phone]     Previous Medical Issues:       History of Present Illness:            Interim Events: reports he tested positive for COVID a few weeks ago but remained stable and did not require hosptilaization. Has a mild residual cough but otherwise back to baseline.            Kidney Disease:        Kidney Disease Dx:  MPO ANCA vasculitis       On Dialysis: Yes, Date initiated: 2020 and Dialysis Type: Home HD;, no episode of peritonitis, BPs stable averaging ~ 130s/70s       Primary Nephrologist: Dr. Valdivia          Cardiac Disease History:  H/o STEMI and VF cardiac arrest 9/6/23 s/p shockwave lithotripsy and SURESH to RCA and SURESH to RPLA, ischemic cardiomyopathy with LVEF of 45%. During his hospital stay he had an in-hospital VT arrest. Repeat coronary angio was completed that showed no evidence of In Stent restenosis. S/p ICD placement.     - outside 11/2024 ECHO showed normal LVEF ~ 65%, no WMAs and no significant valvular abnormalities present.     - outside 1/23/2025 stress test achieved 82 % of maximum predicted HR. Results showed that mid to distal inferior wall(s) were hypokinetic post stress, with more hypokinesis in a larger territory (basal to mid) compared to the rest images (no prior study to compare to).             Functional Capacity/Frailty:       . 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 States he can \"can walk as far as he wants\".  No exertional symptoms.     # COVID Vaccination Up To Date: Not asked      Other Pertinent Transplant Surgical Issues:  Recent Blood Transfusion: No  Previous Abdominal Transplant: No  Bladder Dysfunction: No  Chronic/Recurrent Infections: No  Chronic Anticoagulation: No  Jehovah s Witness: No       Active Problem List:   Patient Active Problem List   Diagnosis     ESRD on peritoneal dialysis (H)     Acute renal failure     Acute ST elevation myocardial infarction (STEMI) of inferior wall (H)     Anemia due to chronic kidney disease     Atherosclerosis of coronary artery     Bladder mass     Bradycardia, sinus     Dyslipidemia     ESRD (end stage renal disease) (H)     History of COVID-19     Ischemic cardiomyopathy     Malignant neoplasm of lateral wall of urinary bladder (H)     Pulmonary nodule     Procedure and treatment not carried out because of patient's decision for reasons of belief and group pressure     Status post right hip replacement     Stented coronary artery     VF (ventricular fibrillation) (H)     ANCA-associated vasculitis (H)       Personal History:  Nonsmoker      Allergies:  Allergies   Allergen Reactions     Amlodipine Palpitations        Medications:  Current Outpatient Medications   Medication Sig Dispense Refill     aspirin (ASA) 81 MG chewable tablet Take 81 mg by mouth daily       carvedilol (COREG) 12.5 MG tablet Take 12.5 mg by mouth 2 times daily (with meals)       carvedilol (COREG) 6.25 MG tablet Take 6.25 mg by mouth 2 times daily (with meals)       levothyroxine (SYNTHROID/LEVOTHROID) 200 MCG tablet Take 1 tablet by mouth daily       levothyroxine (SYNTHROID/LEVOTHROID) 25 MCG tablet Take 1 tablet by mouth daily       lisinopril (ZESTRIL) 20 MG tablet Take 20 mg by mouth daily       Naltrexone HCl, Pain, 4.5 MG CAPS Take 4.5 mg by mouth daily       sevelamer " HCl (RENAGEL) 800 MG tablet Take 800 mg by mouth 3 times daily (with meals)       No current facility-administered medications for this visit.        Vitals:  There were no vitals taken for this visit.     Exam:  GENERAL APPEARANCE: alert and no distress  HENT: no obvious abnormalities on appearance  RESP: breathing appears unremarkable with normal rate, no audible wheezing or cough and no apparent shortness of breath with conversation  MS: extremities normal - no gross deformities noted  SKIN: no apparent rash and normal skin tone  NEURO: speech is clear with no obvious neurological deficits  PSYCH: mentation appears normal and affect normal        40 minutes spent on the date of the encounter doing chart review, history and exam, documentation and further activities per the note.          Virtual Visit Details    Type of service:  Video Visit   Video Start Time: 2:10PM  Video End Time: 2:20 PM     Originating Location (pt. Location): Other [place] in his parked car    Distant Location (provider location):  On-site  Platform used for Video Visit: AmWell      Again, thank you for allowing me to participate in the care of your patient.        Sincerely,        MARY ELLEN Dejesus CNP    Electronically signed

## 2025-02-25 NOTE — PROGRESS NOTES
Virtual Visit Details    Type of service:  Video Visit   Video Start Time: 2:10PM  Video End Time: 2:20 PM     Originating Location (pt. Location): Other [place] in his parked car    Distant Location (provider location):  On-site  Platform used for Video Visit: Sammy

## 2025-02-25 NOTE — NURSING NOTE
Current patient location: [place]    Is the patient currently in the state of [place]? YES    Visit mode: VIDEO    If the visit is dropped, the patient can be reconnected by:VIDEO VISIT: Text to cell phone:   Telephone Information:   Mobile [phone]       Will anyone else be joining the visit? NO  (If patient encounters technical issues they should call [phone]    Are changes needed to the allergy or medication list? No    Are refills needed on medications prescribed by this physician? NO    Rooming Documentation:  Questionnaire(s) completed    Reason for visit: MICHELLE SINGH

## 2025-02-26 ENCOUNTER — COMMITTEE REVIEW (OUTPATIENT)
Dept: TRANSPLANT | Facility: CLINIC | Age: 75
End: 2025-02-26
Payer: COMMERCIAL

## 2025-02-26 DIAGNOSIS — C67.2 MALIGNANT NEOPLASM OF LATERAL WALL OF URINARY BLADDER (H): Primary | ICD-10-CM

## 2025-02-26 NOTE — COMMITTEE REVIEW
Kidney/Pancreas Committee Review Note     Evaluation Date: 7/29/2024  Committee Review Date: 2/26/2025    Organ being evaluated for: Kidney    Transplant Phase: Evaluation  Transplant Status: Active    Transplant Coordinator: Nalini Vargas  Transplant Surgeon:  Dr. Foote     Referring Physician: Loreta Valdivia    Primary Diagnosis: MPO ANCA Vasculitis   Secondary Diagnosis:     Committee Review Members:  Nephrology Shyla Durham, NP, Debi Whitfield MD, Ky Riley, APRN CNP, Tracee Nixon MD, Marcelo Lennon MD   Nurse Laura Resendiz, BIJU   Nutrition Zoila Thomas, RD   Pharmacist Yvette Barraza, [facility]    - Clinical Maricruz Ward, MSW, Ofelia Recio, [facility]   Transplant LINDA AHUMADA, BIJU, Laura Resendiz, BIJU, Radha Neal, BIJU, Adry Bartlett, APRN CNP, Brenda Hernandez, BIJU, Rhina Schuler, RN, Kim Silver, BIJU, Katy Goldsmith, BIJU, Nalini Vargas, RN   Transplant Surgery Myranda Batista MD, MD       Transplant Eligibility: Irreversible chronic kidney disease treated w/dialysis or expected need for dialysis    Committee Review Decision: Needs Re-presentation    Relative Contraindications: None    Absolute Contraindications: None     Committee Chair Myranda Batista MD verbally attested to the committee's decision.    Committee Discussion Details: Reviewed pt's medical status and evaluation results to date with multidisciplinary committee.    Reviewed stress test from 1/23/25 and due to mid to distal inferior walls were hypokinetic post stress with more hypokinesis in a larger territory. Due to patient's CAD history and history of cardiac arrest, needs an updated coronary angiogram prior to proceeding with transplant.     Reviewed Dr. Valdivia believes patient does not believe pt has active vasculitis and his disease is quiescent.     Recommended the following evaluation items:    Cardiology: Needs an updated coronary angiogram.     Patient should have live donors  register now to initiate donor evaluation: Yes    Committee determined that patient is a Fair candidate for Kidney     Listing plan: Needs Representation     A2B Candidate: No    Patient will be called and summary letter will be sent.

## 2025-02-27 ENCOUNTER — TELEPHONE (OUTPATIENT)
Dept: TRANSPLANT | Facility: CLINIC | Age: 75
End: 2025-02-27
Payer: COMMERCIAL

## 2025-02-27 DIAGNOSIS — C67.2 MALIGNANT NEOPLASM OF LATERAL WALL OF URINARY BLADDER (H): Primary | ICD-10-CM

## 2025-02-27 NOTE — LETTER
02/27/25        Cesar Rashid  [address] [place] [id number]        Dear Cesar,    It was a pleasure to see you recently for consideration of kidney transplantation. Your pre-transplant evaluation results were reviewed at our Multidisciplinary Selection Committee on 02/26/2025. The committee is requesting the following items are completed before determining your candidacy:    Coronary Angiogram needs to be updated. The schedulers will call you to schedule.       For any questions, please contact the Transplant Office at [phone].      Sincerely,  MONIK BarfieldN, RN  Pre-Kidney & Pancreas Transplant Coordinator  [facility], [facility]  Solid Organ Transplant  Direct Number: [phone]  Email: [email or web address]

## 2025-02-27 NOTE — TELEPHONE ENCOUNTER
Contacted patient to review outcome of selection committee meeting (See selection committee encounter).   Explained to patient that he/she needs to complete all components of the evaluation to be eligible for active status on the waiting list or to proceed with a live donor kidney transplant.   Reviewed next steps based on outcomes:   Patient will not be listed (patient is on dialysis and evaluation is not complete), patient will receive:    - An Evaluation Summary Letter indicating what is needed to complete evaluation-discussed with patient if they would like to have testing done with [facility] or locally  Confirmed with patient that on successful completion of outstanding components, patient is eligible for active status and they will receive a follow-up call.   Confirmed that patient has contact information for additional questions or concerns.

## 2025-03-04 ENCOUNTER — TELEPHONE (OUTPATIENT)
Dept: CARDIOLOGY | Facility: CLINIC | Age: 75
End: 2025-03-04
Payer: COMMERCIAL

## 2025-03-04 DIAGNOSIS — C67.2 MALIGNANT NEOPLASM OF LATERAL WALL OF URINARY BLADDER (H): Primary | ICD-10-CM

## 2025-03-04 DIAGNOSIS — Z76.82 ORGAN TRANSPLANT CANDIDATE: ICD-10-CM

## 2025-03-04 DIAGNOSIS — N18.6 END STAGE RENAL DISEASE (H): ICD-10-CM

## 2025-03-04 DIAGNOSIS — I25.10 CORONARY ARTERY DISEASE: ICD-10-CM

## 2025-03-04 DIAGNOSIS — I25.10 CORONARY ARTERY DISEASE INVOLVING NATIVE CORONARY ARTERY OF NATIVE HEART WITHOUT ANGINA PECTORIS: Primary | ICD-10-CM

## 2025-03-04 DIAGNOSIS — I77.82 ANCA-ASSOCIATED VASCULITIS (H): ICD-10-CM

## 2025-03-04 DIAGNOSIS — I25.10 CORONARY ARTERY DISEASE INVOLVING NATIVE CORONARY ARTERY OF NATIVE HEART WITHOUT ANGINA PECTORIS: ICD-10-CM

## 2025-03-04 RX ORDER — ASPIRIN 325 MG
325 TABLET ORAL ONCE
OUTPATIENT
Start: 2025-03-04 | End: 2025-03-04

## 2025-03-04 RX ORDER — POTASSIUM CHLORIDE 1500 MG/1
20 TABLET, EXTENDED RELEASE ORAL
OUTPATIENT
Start: 2025-03-04

## 2025-03-04 RX ORDER — LIDOCAINE 40 MG/G
CREAM TOPICAL
OUTPATIENT
Start: 2025-03-04

## 2025-03-04 RX ORDER — POTASSIUM CHLORIDE 1500 MG/1
40 TABLET, EXTENDED RELEASE ORAL
OUTPATIENT
Start: 2025-03-04

## 2025-03-04 RX ORDER — SODIUM CHLORIDE 9 MG/ML
INJECTION, SOLUTION INTRAVENOUS CONTINUOUS
OUTPATIENT
Start: 2025-03-04

## 2025-03-04 RX ORDER — ASPIRIN 81 MG/1
243 TABLET, CHEWABLE ORAL ONCE
OUTPATIENT
Start: 2025-03-04

## 2025-03-04 NOTE — TELEPHONE ENCOUNTER
"----- Message from Nalini PANDEY sent at 2/27/2025  2:56 PM CST -----  Regarding: Coronary Angiogram  Hi Kristyn,    Would you be able to place an order for a coronary angiogram for Rob? I reviewed him at committee yesterday and he had a mild abnormality on his exercise stress test from 1/23/25, \"The mid to distal inferior wall(s) are hypokinetic post stress, with more hypokinesis in a larger territory (basal to mid) compared to the rest images.\" He has a history of CAD requiring PCI. Last cath was 9/8/23 and they recommended this is updated and he does have an outside cardiologist, but was hoping to have the coronary angiogram here.    Let me know if you have any questions.    Thanks,  Nalini  "

## 2025-03-10 ENCOUNTER — TELEPHONE (OUTPATIENT)
Dept: TRANSPLANT | Facility: CLINIC | Age: 75
End: 2025-03-10
Payer: COMMERCIAL

## 2025-03-10 DIAGNOSIS — Z76.82 ORGAN TRANSPLANT CANDIDATE: ICD-10-CM

## 2025-03-10 DIAGNOSIS — I77.82 ANCA-ASSOCIATED VASCULITIS (H): ICD-10-CM

## 2025-03-10 DIAGNOSIS — C67.2 MALIGNANT NEOPLASM OF LATERAL WALL OF URINARY BLADDER (H): Primary | ICD-10-CM

## 2025-03-10 DIAGNOSIS — N18.6 END STAGE RENAL DISEASE (H): ICD-10-CM

## 2025-03-10 DIAGNOSIS — I25.10 CORONARY ARTERY DISEASE: ICD-10-CM

## 2025-03-10 NOTE — TELEPHONE ENCOUNTER
Called Rob to follow-up on his scheduling of the coronary angiogram and post-angio follow-up. Pt has coronary angiogram scheduled for 3/20/25 but was not able to schedule until 5/28/25 with cardiology JIAN. Placed an order for general cards to see if he is able to get an earlier appointment and if so, will cancel appointment with Kristyn Dallas on 5/28/25. Pt verbalized understanding.

## 2025-03-17 ENCOUNTER — TELEPHONE (OUTPATIENT)
Dept: CARDIOLOGY | Facility: CLINIC | Age: 75
End: 2025-03-17
Payer: COMMERCIAL

## 2025-03-17 DIAGNOSIS — C67.2 MALIGNANT NEOPLASM OF LATERAL WALL OF URINARY BLADDER (H): Primary | ICD-10-CM

## 2025-03-17 NOTE — TELEPHONE ENCOUNTER
Pre-procedure instructions - Coronary Angiogram  Patient Education    Your arrival time is 3/20/25 at 1:30.  Location is [address] Waiting Room  Please plan on being at the hospital all day. If you are on dialysis, DO NOT schedule on a dialysis day.  At any time, emergencies and/or urgent cases may come up which could delay the start of your procedure.    Pre-procedure instructions - Coronary Angiogram  Shower in the evening before or the morning of the procedure  No solid food for 8 hours prior and nothing to drink 2 hours prior to arrival time  You can take your morning medications (except for diabetic and blood thinners) with sips of water.  Take 325 mg of Aspirin the night before and the morning of your procedure.  You will need to arrange a ride to drop you off and , as you will be unable to drive home. Prior to discharge you may be required to lay flat for approximately 2-6 hours in the recovery unit to ensure proper clotting of the artery. Please note: You cannot take an Uber/Taxi/etc unless you are accompanied by someone.You will need a responsible adult to stay with you for 24 hours post-procedure.              Diabetic Medication Instructions- NOT ON THESE MEDICATIONS UPON MY REVIEW OF MED LIST  Hold oral diabetic medication in morning of your procedure and for 48 hours after IV contrast is given  Typical instructions for insulin diabetic medication holding are below. However, please reach out to your Primary Care Provider or Endocrinologist for specific instructions  DO NOT take any oral diabetic medication, short-acting diabetes medications/insulin, humalog or regular insulin the morning of your test  Take   dose of long-acting insulin (Lantus, Levemir) the day of your test  Remember to bring your glucometer and insulin with you to take after your test if needed    GLP-1 Agonists Instructions- NOT ON THESE  MEDICATIONS UPON MY REVIEW OF MED LIST  DO NOT take injectable GLP-1 agonists semaglutide (Ozempic, Wegovy), dulaglutide (Trulicity), exenatide ER (Bydureon), tirzepatide (Mounjaro), or oral semaglutide (Rybelsus) for 7 days prior your procedure  Hold once daily injectable GLP-1 agonists exenatide (Byetta), liraglutide (Saxenda, Victoza), lixisenatide (Soligua) the day before and day of your procedure                  Anticoagulation Medication Instructions -NOT ON THESE MEDICATIONS UPON MY REVIEW OF MED LIST  NA  Write N/A if not currently taking    You will need to follow up with one of our cardiology APPs 1-2 weeks after your procedure. If you need help scheduling or rescheduling your appointment, please call [phone].      Pt and wife Rafaela verbalized understanding of instructions, sent instructions via my chart as well.

## 2025-03-17 NOTE — TELEPHONE ENCOUNTER
[facility] Call Center    Phone Message    May a detailed message be left on voicemail: yes     Reason for Call: Other: pt was returning VM left for his upcoming angiogram procedure education. Please call pt back to have conversation on angiogram education       Action Taken: Other: cardiology     Travel Screening: Not Applicable      Thank you!  [facility]

## 2025-03-19 ENCOUNTER — TELEPHONE (OUTPATIENT)
Dept: CARDIOLOGY | Facility: CLINIC | Age: 75
End: 2025-03-19
Payer: COMMERCIAL

## 2025-03-19 NOTE — TELEPHONE ENCOUNTER
Patient Contacted for the patient to call back and schedule the following:    Appointment type: RTN CARDIO  Provider: ARPITA  Return date: 5/13/2025  Specialty phone number: [phone] OPTION 1  Additional appointment(s) needed: N/A  Additonal Notes: CONONARY ANGIOGRAM FOLLOW-UP

## 2025-03-20 ENCOUNTER — APPOINTMENT (OUTPATIENT)
Dept: LAB | Facility: CLINIC | Age: 75
End: 2025-03-20
Attending: INTERNAL MEDICINE
Payer: COMMERCIAL

## 2025-03-20 ENCOUNTER — APPOINTMENT (OUTPATIENT)
Dept: CT IMAGING | Facility: CLINIC | Age: 75
End: 2025-03-20
Attending: NURSE PRACTITIONER
Payer: COMMERCIAL

## 2025-03-20 ENCOUNTER — HOSPITAL ENCOUNTER (OUTPATIENT)
Facility: CLINIC | Age: 75
Discharge: HOME | End: 2025-03-20
Attending: INTERNAL MEDICINE | Admitting: INTERNAL MEDICINE
Payer: COMMERCIAL

## 2025-03-20 ENCOUNTER — APPOINTMENT (OUTPATIENT)
Dept: MEDSURG UNIT | Facility: CLINIC | Age: 75
End: 2025-03-20
Attending: INTERNAL MEDICINE
Payer: COMMERCIAL

## 2025-03-20 ENCOUNTER — APPOINTMENT (OUTPATIENT)
Dept: GENERAL RADIOLOGY | Facility: CLINIC | Age: 75
End: 2025-03-20
Attending: NURSE PRACTITIONER
Payer: COMMERCIAL

## 2025-03-20 VITALS
WEIGHT: 165 LBS | BODY MASS INDEX: 24.44 KG/M2 | SYSTOLIC BLOOD PRESSURE: 117 MMHG | DIASTOLIC BLOOD PRESSURE: 78 MMHG | RESPIRATION RATE: 15 BRPM | HEIGHT: 69 IN | HEART RATE: 66 BPM | TEMPERATURE: 97.4 F | OXYGEN SATURATION: 100 %

## 2025-03-20 DIAGNOSIS — I25.10 CORONARY ARTERY DISEASE INVOLVING NATIVE CORONARY ARTERY OF NATIVE HEART WITHOUT ANGINA PECTORIS: ICD-10-CM

## 2025-03-20 PROBLEM — Z98.890 STATUS POST CORONARY ANGIOGRAM: Status: ACTIVE | Noted: 2025-03-20

## 2025-03-20 LAB
ABO + RH BLD: NORMAL
ALBUMIN SERPL BCG-MCNC: 3.6 G/DL (ref 3.5–5.2)
ALP SERPL-CCNC: 79 U/L (ref 40–150)
ALT SERPL W P-5'-P-CCNC: 23 U/L (ref 0–70)
ANION GAP SERPL CALCULATED.3IONS-SCNC: 13 MMOL/L (ref 7–15)
AST SERPL W P-5'-P-CCNC: 25 U/L (ref 0–45)
ATRIAL RATE - MUSE: 57 BPM
BILIRUB DIRECT SERPL-MCNC: 0.1 MG/DL (ref 0–0.3)
BILIRUB SERPL-MCNC: 0.4 MG/DL
BLD GP AB SCN SERPL QL: NEGATIVE
BUN SERPL-MCNC: 52.7 MG/DL (ref 8–23)
CALCIUM SERPL-MCNC: 8.7 MG/DL (ref 8.8–10.4)
CHLORIDE SERPL-SCNC: 96 MMOL/L (ref 98–107)
CREAT SERPL-MCNC: 14 MG/DL (ref 0.67–1.17)
DIASTOLIC BLOOD PRESSURE - MUSE: NORMAL MMHG
EGFRCR SERPLBLD CKD-EPI 2021: 3 ML/MIN/1.73M2
ERYTHROCYTE [DISTWIDTH] IN BLOOD BY AUTOMATED COUNT: 15.3 % (ref 10–15)
EST. AVERAGE GLUCOSE BLD GHB EST-MCNC: 97 MG/DL
GLUCOSE SERPL-MCNC: 89 MG/DL (ref 70–99)
HBA1C MFR BLD: 5 %
HCO3 SERPL-SCNC: 27 MMOL/L (ref 22–29)
HCT VFR BLD AUTO: 36.7 % (ref 40–53)
HGB BLD-MCNC: 11.9 G/DL (ref 13.3–17.7)
INTERPRETATION ECG - MUSE: NORMAL
MCH RBC QN AUTO: 31.6 PG (ref 26.5–33)
MCHC RBC AUTO-ENTMCNC: 32.4 G/DL (ref 31.5–36.5)
MCV RBC AUTO: 98 FL (ref 78–100)
P AXIS - MUSE: 48 DEGREES
PLATELET # BLD AUTO: 241 10E3/UL (ref 150–450)
POTASSIUM SERPL-SCNC: 4.9 MMOL/L (ref 3.4–5.3)
PR INTERVAL - MUSE: 204 MS
PROT SERPL-MCNC: 7 G/DL (ref 6.4–8.3)
QRS DURATION - MUSE: 104 MS
QT - MUSE: 496 MS
QTC - MUSE: 482 MS
R AXIS - MUSE: 6 DEGREES
RBC # BLD AUTO: 3.76 10E6/UL (ref 4.4–5.9)
SODIUM SERPL-SCNC: 136 MMOL/L (ref 135–145)
SPECIMEN EXP DATE BLD: NORMAL
SYSTOLIC BLOOD PRESSURE - MUSE: NORMAL MMHG
T AXIS - MUSE: 0 DEGREES
VENTRICULAR RATE- MUSE: 57 BPM
WBC # BLD AUTO: 6.9 10E3/UL (ref 4–11)

## 2025-03-20 PROCEDURE — 999N000054 HC STATISTIC EKG NON-CHARGEABLE

## 2025-03-20 PROCEDURE — 250N000011 HC RX IP 250 OP 636: Performed by: INTERNAL MEDICINE

## 2025-03-20 PROCEDURE — 83036 HEMOGLOBIN GLYCOSYLATED A1C: CPT | Performed by: NURSE PRACTITIONER

## 2025-03-20 PROCEDURE — 71250 CT THORAX DX C-: CPT

## 2025-03-20 PROCEDURE — 93005 ELECTROCARDIOGRAM TRACING: CPT

## 2025-03-20 PROCEDURE — 85027 COMPLETE CBC AUTOMATED: CPT

## 2025-03-20 PROCEDURE — 258N000003 HC RX IP 258 OP 636

## 2025-03-20 PROCEDURE — 86900 BLOOD TYPING SEROLOGIC ABO: CPT | Performed by: NURSE PRACTITIONER

## 2025-03-20 PROCEDURE — 71046 X-RAY EXAM CHEST 2 VIEWS: CPT | Mod: 26 | Performed by: RADIOLOGY

## 2025-03-20 PROCEDURE — 250N000009 HC RX 250: Performed by: INTERNAL MEDICINE

## 2025-03-20 PROCEDURE — 999N000134 HC STATISTIC PP CARE STAGE 3

## 2025-03-20 PROCEDURE — 71250 CT THORAX DX C-: CPT | Mod: 26 | Performed by: RADIOLOGY

## 2025-03-20 PROCEDURE — 99152 MOD SED SAME PHYS/QHP 5/>YRS: CPT | Performed by: INTERNAL MEDICINE

## 2025-03-20 PROCEDURE — 272N000001 HC OR GENERAL SUPPLY STERILE: Performed by: INTERNAL MEDICINE

## 2025-03-20 PROCEDURE — 999N000142 HC STATISTIC PROCEDURE PREP ONLY

## 2025-03-20 PROCEDURE — 93454 CORONARY ARTERY ANGIO S&I: CPT | Mod: 26 | Performed by: INTERNAL MEDICINE

## 2025-03-20 PROCEDURE — 82248 BILIRUBIN DIRECT: CPT | Performed by: NURSE PRACTITIONER

## 2025-03-20 PROCEDURE — 36415 COLL VENOUS BLD VENIPUNCTURE: CPT | Performed by: NURSE PRACTITIONER

## 2025-03-20 PROCEDURE — 99152 MOD SED SAME PHYS/QHP 5/>YRS: CPT | Mod: GC | Performed by: INTERNAL MEDICINE

## 2025-03-20 PROCEDURE — 99207 PR NO BILLABLE SERVICE THIS VISIT: CPT | Performed by: NURSE PRACTITIONER

## 2025-03-20 PROCEDURE — 93454 CORONARY ARTERY ANGIO S&I: CPT | Performed by: INTERNAL MEDICINE

## 2025-03-20 PROCEDURE — 86850 RBC ANTIBODY SCREEN: CPT | Performed by: NURSE PRACTITIONER

## 2025-03-20 PROCEDURE — 71046 X-RAY EXAM CHEST 2 VIEWS: CPT

## 2025-03-20 PROCEDURE — C1894 INTRO/SHEATH, NON-LASER: HCPCS | Performed by: INTERNAL MEDICINE

## 2025-03-20 PROCEDURE — 80053 COMPREHEN METABOLIC PANEL: CPT | Performed by: NURSE PRACTITIONER

## 2025-03-20 PROCEDURE — 36415 COLL VENOUS BLD VENIPUNCTURE: CPT

## 2025-03-20 RX ORDER — NALOXONE HYDROCHLORIDE 0.4 MG/ML
0.2 INJECTION, SOLUTION INTRAMUSCULAR; INTRAVENOUS; SUBCUTANEOUS
Status: DISCONTINUED | OUTPATIENT
Start: 2025-03-20 | End: 2025-03-20 | Stop reason: HOSPADM

## 2025-03-20 RX ORDER — ATROPINE SULFATE 0.1 MG/ML
0.5 INJECTION INTRAVENOUS
Status: DISCONTINUED | OUTPATIENT
Start: 2025-03-20 | End: 2025-03-20 | Stop reason: HOSPADM

## 2025-03-20 RX ORDER — SODIUM CHLORIDE 9 MG/ML
INJECTION, SOLUTION INTRAVENOUS CONTINUOUS
Status: DISCONTINUED | OUTPATIENT
Start: 2025-03-20 | End: 2025-03-20 | Stop reason: HOSPADM

## 2025-03-20 RX ORDER — POTASSIUM CHLORIDE 750 MG/1
40 TABLET, EXTENDED RELEASE ORAL
Status: DISCONTINUED | OUTPATIENT
Start: 2025-03-20 | End: 2025-03-20 | Stop reason: HOSPADM

## 2025-03-20 RX ORDER — IOPAMIDOL 755 MG/ML
INJECTION, SOLUTION INTRAVASCULAR
Status: DISCONTINUED | OUTPATIENT
Start: 2025-03-20 | End: 2025-03-20 | Stop reason: HOSPADM

## 2025-03-20 RX ORDER — NICARDIPINE HYDROCHLORIDE 2.5 MG/ML
INJECTION INTRAVENOUS
Status: DISCONTINUED | OUTPATIENT
Start: 2025-03-20 | End: 2025-03-20 | Stop reason: HOSPADM

## 2025-03-20 RX ORDER — OXYCODONE HYDROCHLORIDE 5 MG/1
5 TABLET ORAL EVERY 4 HOURS PRN
Status: DISCONTINUED | OUTPATIENT
Start: 2025-03-20 | End: 2025-03-20 | Stop reason: HOSPADM

## 2025-03-20 RX ORDER — OXYCODONE HYDROCHLORIDE 10 MG/1
10 TABLET ORAL EVERY 4 HOURS PRN
Status: DISCONTINUED | OUTPATIENT
Start: 2025-03-20 | End: 2025-03-20 | Stop reason: HOSPADM

## 2025-03-20 RX ORDER — NITROGLYCERIN 5 MG/ML
VIAL (ML) INTRAVENOUS
Status: DISCONTINUED | OUTPATIENT
Start: 2025-03-20 | End: 2025-03-20 | Stop reason: HOSPADM

## 2025-03-20 RX ORDER — ASPIRIN 325 MG
325 TABLET ORAL ONCE
Status: COMPLETED | OUTPATIENT
Start: 2025-03-20 | End: 2025-03-20

## 2025-03-20 RX ORDER — POTASSIUM CHLORIDE 750 MG/1
20 TABLET, EXTENDED RELEASE ORAL
Status: DISCONTINUED | OUTPATIENT
Start: 2025-03-20 | End: 2025-03-20 | Stop reason: HOSPADM

## 2025-03-20 RX ORDER — ASPIRIN 81 MG/1
243 TABLET, CHEWABLE ORAL ONCE
Status: COMPLETED | OUTPATIENT
Start: 2025-03-20 | End: 2025-03-20

## 2025-03-20 RX ORDER — NALOXONE HYDROCHLORIDE 0.4 MG/ML
0.4 INJECTION, SOLUTION INTRAMUSCULAR; INTRAVENOUS; SUBCUTANEOUS
Status: DISCONTINUED | OUTPATIENT
Start: 2025-03-20 | End: 2025-03-20 | Stop reason: HOSPADM

## 2025-03-20 RX ORDER — FLUMAZENIL 0.1 MG/ML
0.2 INJECTION, SOLUTION INTRAVENOUS
Status: DISCONTINUED | OUTPATIENT
Start: 2025-03-20 | End: 2025-03-20 | Stop reason: HOSPADM

## 2025-03-20 RX ORDER — HEPARIN SODIUM 1000 [USP'U]/ML
INJECTION, SOLUTION INTRAVENOUS; SUBCUTANEOUS
Status: DISCONTINUED | OUTPATIENT
Start: 2025-03-20 | End: 2025-03-20 | Stop reason: HOSPADM

## 2025-03-20 RX ORDER — FENTANYL CITRATE 50 UG/ML
INJECTION, SOLUTION INTRAMUSCULAR; INTRAVENOUS
Status: DISCONTINUED | OUTPATIENT
Start: 2025-03-20 | End: 2025-03-20 | Stop reason: HOSPADM

## 2025-03-20 RX ORDER — LIDOCAINE 40 MG/G
CREAM TOPICAL
Status: DISCONTINUED | OUTPATIENT
Start: 2025-03-20 | End: 2025-03-20 | Stop reason: HOSPADM

## 2025-03-20 RX ORDER — ACETAMINOPHEN 325 MG/1
650 TABLET ORAL EVERY 4 HOURS PRN
Status: DISCONTINUED | OUTPATIENT
Start: 2025-03-20 | End: 2025-03-20 | Stop reason: HOSPADM

## 2025-03-20 RX ORDER — FENTANYL CITRATE 50 UG/ML
25 INJECTION, SOLUTION INTRAMUSCULAR; INTRAVENOUS
Status: DISCONTINUED | OUTPATIENT
Start: 2025-03-20 | End: 2025-03-20 | Stop reason: HOSPADM

## 2025-03-20 RX ADMIN — SODIUM CHLORIDE: 0.9 INJECTION, SOLUTION INTRAVENOUS at 12:44

## 2025-03-20 ASSESSMENT — ACTIVITIES OF DAILY LIVING (ADL)
ADLS_ACUITY_SCORE: 41
ADLS_ACUITY_SCORE: 41
ADLS_ACUITY_SCORE: 43

## 2025-03-20 NOTE — PROGRESS NOTES
Consenting/Education for Cardiology Procedure: Possible percutaneous intervention and Coronary angiogram    Patient understands we would like to perform the listed procedure(s) due to ESRD.    The patient understands the following:     The procedure was described to the patient in detail.    Moderate sedation is required for this procedure and the risks, benefits and alternatives to moderate sedation were discussed. Patient also understands risks and complications of the procedure which include but are not limited to bruising/swelling around the incision site, infection, bleeding, allergic reaction to local anesthetic, air embolism, arterial puncture, stroke, heart attack, need for emergency surgery, death.    Patient verbalized understanding of risks and benefits and has elected to proceed with the procedure or procedures listed above.    MARY ELLEN Nation CNP  Cardiology

## 2025-03-20 NOTE — PRE-PROCEDURE
GENERAL PRE-PROCEDURE:   Procedure:  Coronary angiogram with possible PCI  Date/Time:  3/20/2025 12:46 PM    Verbal consent obtained?: Yes    Written consent obtained?: Yes    Risks and benefits: Risks, benefits and alternatives were discussed    DC Plan: Appropriate discharge home plan in place for patients who are going home after procedure   Consent given by:  Patient  Patient states understanding of procedure being performed: Yes    Patient's understanding of procedure matches consent: Yes    Procedure consent matches procedure scheduled: Yes    Expected level of sedation:  Moderate  Appropriately NPO:  Yes  ASA Class:  3  Mallampati  :  Grade 3- soft palate visible, posterior pharyngeal wall not visible  Lungs:  Lungs clear with good breath sounds bilaterally  Heart:  Normal heart sounds and rate  History & Physical reviewed:  History and physical reviewed and updates made (see comment)  H&P Comments:  Clinically Significant Risk Factors Present on Admission    Cardiovascular : Native vessel CAD  Cardiomyopathy    Nephrology: CKD POA List: ESRD on dialysis    [unfilled]    Statement of review:  I have reviewed the lab findings, diagnostic data, medications, and the plan for sedation

## 2025-03-20 NOTE — PROGRESS NOTES
D/I/A: Pt roomed on 2a room 2.  Arrived via litter and accompanied by BIJU Joshi On/Off: Off monitor.  VSSA.  Rhythm upon arrival SR on monitor.  Denies pain or sob.  Reviewed activity restrictions and when to notify RN, ie-changes to breathing or increased chest pressure or chest pain.  CCL access:  right TR band. Site is CDI, soft, flat, non tender. +CMS  P: Continue to monitor status.  CVTS consult ordered. Discharge to home once meeting criteria.    1442 Report given to BIJU Mcfarland.  Unable to collect urine sample-pt oliguric. OLEG Jimenez NP aware.

## 2025-03-20 NOTE — DISCHARGE SUMMARY
Pt discharged to home. VSS on RA. Right radial access site C/D/I, negative for a hematoma. Up ambulating w/o difficulty; pt oliguric. CXR, chest CT, and labs were completed today as part of the CVTS w/up. PIV access removed. Tolerating PO intake. Discharge instructions reviewed and all questions answered. Pt escorted to the front entrance with RN where he met with his wife.

## 2025-03-20 NOTE — PROGRESS NOTES
CVTS Brief Note    Consult received for OP CABG evaluation in anticipation of renal transplant.  Met with Rob and his wife and discussed process. Will bring patient back to clinic to consult with surgeon. In the meantime, will obtain some imaging and labs prior to discharge today.  CT chest, 2-view CXR, type and screen, hepatic panel, Hgb A1c and UA  Our RN Care Coordinator will follow up outpatient.    MARY ELLEN Pepe, ACNPC-AG, CCRN  Nurse Practitioner  Cardiothoracic Surgery

## 2025-03-20 NOTE — DISCHARGE INSTRUCTIONS
Going Home after a Coronary Angiogram  ______________________________________________  After you go home:  Have an adult stay with you for 24 hours.  Drink plenty of fluids.  You may eat your normal diet, unless your doctor tells you otherwise.  For 24 hours:  Relax and take it easy.  Do NOT smoke.  Do NOT make any important or legal decisions.  Do NOT drive or operate machines at home or at work.  Do NOT drink alcohol.  Remove the Band-Aid after 24 hours. If there is minor oozing, apply another Band-aid and remove it after 12 hours.  For 2 days, do NOT have sex or do any heavy exercise.  Do NOT take a bath, or use a hot tub or pool for at least 3 days. You may shower.    Care of wrist or arm site  It is normal to have soreness at the puncture site and mild tingling in your hand for up to 3 days.  For 2 days, do not use your hand or arm to support your weight (such as rising from a chair) or bend your wrist.  For 2 days, do not lift more than 5 pounds or exercise your arm (tennis, golf or bowling).    If you start bleeding from the site in your arm:  Sit down and press firmly on the site with your fingers for 10 minutes. Call your doctor as soon as you can.  If the bleeding stops, sit still and keep your wrist straight for 2 hours.    Medicines  If you have started taking Plavix or Effient, do not stop taking it until you talk to your heart doctor (cardiologist).  If you are on metformin (Glucophage), do not restart it until you have blood tests (within 2 to 3 days after discharge). When your doctor tells you it is safe, you may restart the metformin.  If you have stopped any other medicines, check with your nurse or provider about when to restart them.    Call 911 right away if you have bleeding that is heavy or does not stop.    Call your doctor if:  You have a large or growing hard lump around the site.  The site is red, swollen, hot or tender.  Blood or fluid is draining from the site.  You have chills or a  fever greater than 101 F (38 C).  Your leg or arm feels numb or cool.  You have hives, a rash or unusual itching.  [facility] [place]:  [phone] (7 days a week)

## 2025-03-20 NOTE — PROGRESS NOTES
Pt arrives to 2a, with spouse, for CORS. H&P is up to date. Consent is signed. Labs completed. Contrast discharge instructions reviewed with pt pre procedure, copy given.

## 2025-03-25 ENCOUNTER — TELEPHONE (OUTPATIENT)
Dept: TRANSPLANT | Facility: CLINIC | Age: 75
End: 2025-03-25
Payer: COMMERCIAL

## 2025-03-25 DIAGNOSIS — C67.2 MALIGNANT NEOPLASM OF LATERAL WALL OF URINARY BLADDER (H): Primary | ICD-10-CM

## 2025-03-25 NOTE — TELEPHONE ENCOUNTER
Called to follow-up with patient following his coronary angiogram. Pt was referred to cardiothoracic surgery for CABG evaluation and is scheduled to see Dr. Huerta on 4/7/25. Pt states he is doing well and is working on diet and lifestyle modifications. Pt is unsure if he wants to move forward with a CABG and is hoping changing his diet would help. Discussed with patient that for transplant, he will need a cardiac risk assessment and their recommendations on whether or not it is safe to proceed with a kidney transplant. Additionally, discussed the transplant team will need to review his angiogram, cardiology's risk assessment and cardiothoracic surgery's recommendations prior to determining if he is okay to proceed with a transplant. Pt verbalized understanding and is aware a CABG may be required prior to consideration for active status if recommended by cardiothoracic surgery. Pt and his wife had no further questions at this time and have my direct number.

## 2025-04-07 ENCOUNTER — OFFICE VISIT (OUTPATIENT)
Dept: CARDIOLOGY | Facility: CLINIC | Age: 75
End: 2025-04-07
Attending: SURGERY
Payer: COMMERCIAL

## 2025-04-07 VITALS
OXYGEN SATURATION: 97 % | BODY MASS INDEX: 24.58 KG/M2 | SYSTOLIC BLOOD PRESSURE: 164 MMHG | HEART RATE: 58 BPM | DIASTOLIC BLOOD PRESSURE: 88 MMHG | WEIGHT: 168.8 LBS

## 2025-04-07 DIAGNOSIS — I25.10 CAD (CORONARY ARTERY DISEASE): ICD-10-CM

## 2025-04-07 DIAGNOSIS — R79.9 ABNORMAL FINDING OF BLOOD CHEMISTRY, UNSPECIFIED: ICD-10-CM

## 2025-04-07 DIAGNOSIS — R09.89 OTHER SPECIFIED SYMPTOMS AND SIGNS INVOLVING THE CIRCULATORY AND RESPIRATORY SYSTEMS: ICD-10-CM

## 2025-04-07 DIAGNOSIS — I25.10 CORONARY ARTERY DISEASE INVOLVING NATIVE CORONARY ARTERY OF NATIVE HEART WITHOUT ANGINA PECTORIS: Primary | ICD-10-CM

## 2025-04-07 DIAGNOSIS — R07.9 CHEST PAIN, UNSPECIFIED TYPE: ICD-10-CM

## 2025-04-07 DIAGNOSIS — R07.1 CHEST PAIN ON BREATHING: ICD-10-CM

## 2025-04-07 DIAGNOSIS — C67.2 MALIGNANT NEOPLASM OF LATERAL WALL OF URINARY BLADDER (H): Primary | ICD-10-CM

## 2025-04-07 PROCEDURE — G0463 HOSPITAL OUTPT CLINIC VISIT: HCPCS | Performed by: SURGERY

## 2025-04-07 PROCEDURE — 99204 OFFICE O/P NEW MOD 45 MIN: CPT | Performed by: SURGERY

## 2025-04-07 RX ORDER — ROSUVASTATIN CALCIUM 10 MG/1
10 TABLET, COATED ORAL AT BEDTIME
COMMUNITY
Start: 2025-02-25 | End: 2026-02-25

## 2025-04-07 ASSESSMENT — PAIN SCALES - GENERAL: PAINLEVEL_OUTOF10: NO PAIN (0)

## 2025-04-07 NOTE — NURSING NOTE
Chief Complaint   Patient presents with    New Patient     NEW CV SURGERY - cabg      Vitals were taken and medications reconciled.    Kirby Chávez, SHRUTI  3:50 PM

## 2025-04-07 NOTE — LETTER
4/7/2025      RE: Cesar Rashid  [address] [facility] [address]       Dear Colleague,    Thank you for the opportunity to participate in the care of your patient, Cesar Rashid, at the [facility] [place] [facility]. Please see a copy of my visit note below.    HPI:     Cesar Rashid is a 74 y.o. male seen for follow-up CAD and cardiovascular evaluation in anticipation renal transplant.    He was last seen in cardiology clinic on 5/6/2024 by Kitty Pope PA-C for routine follow-up.  In review, he has history of end-stage renal disease on peritoneal dialysis, anemia of chronic disease, hypertension, hypothyroidism, CAD with inferior STEMI and VF cardiac arrest 9/6/23 s/p shockwave lithotripsy and SURESH to RCA and SURESH x1 to RPLA, ischemic cardiomyopathy with LVEF of 45%, on amiodarone for VT.  Follow-up echo 11/18/2024 showed preserved LV systolic function, EF 60% significant. Amiodarone was reduced to 100 mg daily per EP recommendations at last visit given no recurrent VT device checks.     When he was last seen in May 2024, he had taken himself off of amiodarone, isosorbide mononitrate, hydralazine and clopidogrel. He has done well since then.  He is currently being considered for kidney transplant at the [facility]. 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 He denies any shortness of breath, chest pain, palpitations, lightheadedness/dizziness or lower extremity edema. He developed significant cramping in his hands to the point where he was unable to tie and neck tie while on atorvastatin. This all resolved when he stopped the medications.    He underwent coronary angiogram that showed showed severe CAD.                  Current Outpatient Medications   Medication Sig Dispense Refill     aspirin 81 MG chewable tablet Chew and swallow 1 Tablet (81 mg) by mouth daily.  Indications: Subacute Stent Thrombosis Prevention 100 Tablet 3     carvedilol (COREG) 6.25 MG tablet take 1 tablet by mouth twice daily with meals 180 Tablet 2     levothyroxine (SYNTHROID) 200 MCG tablet Take 1 Tablet (200 mcg) by mouth daily.         levothyroxine (SYNTHROID) 25 MCG tablet Take 0.5 Tablets (12.5 mcg) by mouth daily.         lisinopril (ZESTRIL) 5 MG tablet Take 1 Tablet (5 mg) by mouth daily.         Multiple Vitamins-Iron (MULTIPLE VITAMIN/IRON OR)           Naltrexone HCl, Pain, 4.5 MG CAPS a-naltrexone 4.5mg caps   TAKE ONE CAPSULE DAILY AT BEDTIME for inflammation         nitroglycerin (NITROSTAT) 0.4 MG sublingual tablet Place 1 Tablet (0.4 mg) under tongue every 5 minutes as needed for Chest Pain. If no relief after 5 min call 911;continue 1 tab every 5 min max 3 tab (Patient not taking: Reported on 12/4/2024) 15 Tablet 3     Omega 3-6-9 Fatty Acids (OMEGA-3-6-9 OR)           saline (OCEAN) 0.65 % nasal solution Nasal Spray (sodium chloride) 0.65 % aerosol   USE 4 DOSES IN EACH NOSTRIL 4 TIMES DAILY (Patient not taking: Reported on 4/16/2024)         sevelamer (RENAGEL) 800 MG tablet Take 3 Tablets (2,400 mg) by mouth three times a day with meals.          No current facility-administered medications for this visit.         Allergies and drug reactions:         Allergies   Allergen Reactions     Amlodipine Palpitations     Levofloxacin Other, see comments                    PSYCHOSOCIAL HISTORY  Social History           Substance and Sexual Activity   Alcohol Use Yes     Comment: every once in a while 1 beer      Social History          Tobacco Use   Smoking Status Never   Smokeless Tobacco Never      Social History          Substance and Sexual Activity   Drug Use Not on file          FAMILY HISTORY:         Family History   Problem Relation Name Age of Onset     Diabetes Other         Cataract Other         Glaucoma Other         Diabetes, Type II Birth Mother         Cancer, Other Birth  "Mother             colon     Coronary Artery Disease Birth Mother         Coronary Artery Disease Birth Father         Thyroid Disorder Birth Father         Alcohol Abuse Brother         Other (Other) Brother             car accident     Diabetes, Type II Brother         Anesthesia Reaction Negative Family History             There is otherwise no family history of premature CAD, cardiomyopathy or sudden cardiac death.     REVIEW OF SYSTEMS:   Pertinent review of systems are noted in the HPI above.  All other review of systems are negative.     PHYSICAL EXAMINATION:      Filed Vitals:     12/04/24 0955   BP: (!) 152/84   Pulse: 63   Resp: 16   SpO2: 99%   Weight: 173 lb 14.4 oz (78.9 kg)      Estimated body mass index is 24.25 kg/m  as calculated from the following:    Height as of 9/7/23: 5' 11\" (1.803 m).    Weight as of this encounter: 173 lb 14.4 oz (78.9 kg).      Constitutional: Comfortable and in no distress.   Eyes: No icterus.   HENT: Head: Normocephalic, no masses.   Pulmonary:  Chest symmetric, lungs clear bilaterally and no crackles, wheezes or rales.   Cardiovascular: RRR with normal S1 and S2, no murmur, JVP normal.   Gastrointestinal: Normal bowel sounds, non-tender.   Musculoskeletal: Edema of the lower extremities: None.   Skin:  Normal skin color, texture, and turgor.     Neurologic: Oriented and appropriate without obvious focal deficits.    Psychiatric: Normal affect.          REVIEW OF LABORATORY, PATHOLOGY, AND RADIOLOGY DATA:  Lab results:         Lab Results   Component Value Date/Time     BUN 54 (H) 09/11/2023 08:06 AM     SODIUM 135 (L) 09/11/2023 08:06 AM     K 4.2 09/11/2023 08:06 AM     CHLORIDE 95 (L) 09/11/2023 08:06 AM     CO2 25 12/19/2019 04:11 PM     GLUCOSE 91 09/11/2023 08:06 AM     CREATININE 14.17 (HH) 09/11/2023 08:06 AM     GFR 3 (L) 09/11/2023 08:06 AM            Lab Results   Component Value Date/Time     WBC 5.4 01/02/2024 09:37 AM     RBC 3.82 (L) 01/02/2024 09:37 AM     " "HGB 12.3 (L) 01/02/2024 09:37 AM     HCT 36.3 (L) 01/02/2024 09:37 AM     MCV 95.0 01/02/2024 09:37 AM     MCH 32.2 01/02/2024 09:37 AM     MCHC 33.9 01/02/2024 09:37 AM     PLTS 214 01/02/2024 09:37 AM            Lab Results   Component Value Date/Time     TROP 188.90 (H) 09/07/2023 11:08 PM     TROP 204.09 (H) 09/07/2023 08:14 PM     TROP 352.86 (H) 09/06/2023 03:25 PM      No results found for: \"PROBNP\"      Lipid Panel with liver enzymes:        Cholesterol   Date Value Ref Range Status   01/02/2024 153 0 - 199 mg/dL Final   09/06/2023 159 0 - 199 mg/dL Final            HDL Cholesterol   Date Value Ref Range Status   01/02/2024 52 >=40 mg/dL Final   09/06/2023 40 >=40 mg/dL Final            LDL, Calculated   Date Value Ref Range Status   01/02/2024 81 <130 mg/dL Final   09/06/2023 110 <130 mg/dL Final            Triglyceride   Date Value Ref Range Status   01/02/2024 99 <=149 mg/dL Final   09/06/2023 46 <=149 mg/dL Final            AST (SGOT)   Date Value Ref Range Status   09/10/2023 40 10 - 40 U/L Final            ALT (SGPT)   Date Value Ref Range Status   09/10/2023 37 <=55 U/L Final            Bilirubin, Total   Date Value Ref Range Status   09/10/2023 0.4 0.2 - 1.2 mg/dL Final         Other lab results:        TSH, Sensitive   Date Value Ref Range Status   09/07/2023 2.29 0.30 - 4.50 uIU/mL Final            INR   Date Value Ref Range Status   09/11/2023 1.1 0.9 - 1.1 Final       Echo: Date: 7/29/2024 Results:  Echocardiogram with two-dimensional, color and spectral Doppler performed.  ______________________________________________________________________________  Interpretation Summary  Global and regional left ventricular function is normal with an EF of 55-60%.  Global right ventricular function is normal. The right ventricle is normal  size.  No significant valvular abnormalities present.  There is mild dilation at the level of the ascending aorta (4.2 cm, indexed  value 2.21 cm/m2).  IVC diameter <2.1 " cm collapsing >50% with sniff suggests a normal RA pressure  of 3 mmHg.  There is no prior study for direct comparison.     Stress Test:  Date: 1/2025 Results:  Summary    1. The patient exercised for  7 minute(s) on the  Kahlil protocol and achieved 82 % of maximum predicted HR.  Study was diagnostic with mild reduction in sensitivity given marginally below HR target.     2. Normal functional capacity for age.     3. Based on Mark Score of 7 the patient is at Low risk for cardiovascular events.     4. The patient experienced no symptoms during the stress test.     5. Negative stress ECG for ST segment depression- intermittent inferior T wave inversions noted.     6. Normal left ventricular systolic function with very mild basal inferior and inferolateral wall motion abnormalities noted at rest.   LVEF 60%.     7. The mid to distal inferior wall(s) are hypokinetic post stress, with more hypokinesis in a larger territory (basal to mid) compared to the rest images.     8. A prior study is not available for comparison.         Report Signatures   Stress ECG Finalized by Phoenix Poole MD on 01/23/2025 09:55 AM   Echo Finalized by Phoenix Poole MD on 01/23/2025 09:55 AM      ECG Reviewed:  Date: Results:     Cath:  Date: 3/2025 Results:  Conclusion           Ost LM to Mid LM lesion is 40% stenosed.     Ost LAD to Prox LAD lesion is 40% stenosed.     Prox LAD to Mid LAD lesion is 70% stenosed.     Mid LAD lesion is 70% stenosed.     1st Diag lesion is 50% stenosed.     Prox Cx lesion is 80% stenosed.     1st Mrg lesion is 40% stenosed.     Mid Cx to Dist Cx lesion is 70% stenosed.     Prox RCA lesion is 70% stenosed.     Prox RCA to Mid RCA lesion is 100% stenosed.     1.Moderate lesion of the proximal left main coronary artery.  2.Moderate diffuse disease of the left anterior descending artery. There are severe tandem 70% obstructive lesions distal to the takeoff of the first diagonal.  3.There is an 80%  obstructive lesion of the proximal left circumflex artery as well as a 70% obstructive lesion distal to the takeoff of OM2.  4.There is a focal 70% obstructive lesion of the proximal right coronary artery. The previously placed drug-eluting stent has a 100% chronic in-stent restenosis. The distal vessel fills via R-R and L-R collaterals.  5.Successful uncomplicated right radial arterial access with hemostasis by TR band placement.       Assessment     Cesar Rashid is a 74 year old male with severe CAD who is referred for CABG. I agree with the recommendation to consider him for CABG. I discussed the risks and benefits of surgery including the risks of death, stroke, bleeding, infection, renal failure and arrhythmias. They understand and are willing to consider it. Patient is a [employer] and we will refer to the PAC clinic for optimization.    Please do not hesitate to contact me if you have any questions/concerns.     Sincerely,     Ridge Huerta MD

## 2025-04-07 NOTE — PATIENT INSTRUCTIONS
You were seen today in the [facility]                     Cardiothoracic Surgery Clinic    Your surgeon was Dr Ridge Huerta recommends:    coronary artery bypass graft  Pre operative testing including; history and physical, labs, EKG, chest xray, carotid ultrasound, vein mapping - we will get these scheduled for you!  Talk to Pre Assessment/Anesthesia (History & physical) about blood alternative (Epigen, Kcentra, Fibryga , factor 7?)       *Pre operative testing needed within 30 days of surgery. History and physical with the anesthesia team (PAC), labs, EKG, and chest xray.     *You will be contacted by our surgery scheduler Haley to set up your surgery date. Once we have your surgery date you will hear from Sonia, our clinic scheduler, to set up your pre op appointments. Once these are both scheduled you will get a letter with pre operative instructions including possible medication holds, where and when to be at the hospital, and what to bring with you.    Please feel free to call me with any questions or concerns.    Zoila Aggarwal, RN   Cardiothoracic Surgery  [phone]

## 2025-04-08 ENCOUNTER — TELEPHONE (OUTPATIENT)
Dept: CARDIOLOGY | Facility: CLINIC | Age: 75
End: 2025-04-08
Payer: COMMERCIAL

## 2025-04-08 ENCOUNTER — PATIENT OUTREACH (OUTPATIENT)
Dept: OTHER | Facility: CLINIC | Age: 75
End: 2025-04-08
Payer: COMMERCIAL

## 2025-04-08 DIAGNOSIS — C67.2 MALIGNANT NEOPLASM OF LATERAL WALL OF URINARY BLADDER (H): Primary | ICD-10-CM

## 2025-04-08 NOTE — TELEPHONE ENCOUNTER
Per task, pt needs to schedule surgery with Dr. Huerta. Talked with pt and pt states that they would like some additional time to time about it before scheduling. Asked me to call back. Will follow up Monday

## 2025-04-08 NOTE — TELEPHONE ENCOUNTER
Discussed ACP with patient. Please refer to serious illness flowsheet for all clinically meaningful ACP-related information.  Serious Illness Conversation        4/8/2025    11:12 AM   SERIOUS ILLNESS CONVERSATION   People Present Patient;Spouse   How much do the people closest to you know about your priorities and wishes for your care? Some discussion. Further discussion needed.   Given goals and what we know about the illness, what is the recommendation you gave to the patient? Create/update a Health Care Directive (provided Honoring Choices handout and/or placed ACP facilitator order);Discuss goals and values with loved ones   Recommendation/Next Step Comment Pt/spouse reported pt has completed a [place] and [place] HCD previously and provided to the clinic. However, pt plans to complete the PREPARE HCD and will provide copy at next clinic appt.   Other Resource Comments [facility]; [email or web address]. [facility] provided psychoeducation r/t ACP/HCD ([place] policies).

## 2025-04-09 ENCOUNTER — TELEPHONE (OUTPATIENT)
Dept: CARDIOLOGY | Facility: CLINIC | Age: 75
End: 2025-04-09
Payer: COMMERCIAL

## 2025-04-09 DIAGNOSIS — C67.2 MALIGNANT NEOPLASM OF LATERAL WALL OF URINARY BLADDER (H): Primary | ICD-10-CM

## 2025-04-09 NOTE — TELEPHONE ENCOUNTER
FUTURE VISIT INFORMATION      SURGERY INFORMATION:  Discuss options to optimize his hemoglobin/platlets prior to surgery (CABG) and during surgery. Pt is a Jehov[employer] Witness    RECORDS REQUESTED FROM:       Primary Care Provider: Steffi Vivar MD - [facility]     Pertinent Medical History: Coronary arteriosclerosis; Systolic heart failure; Hypertensive disorder; s/p Kidney transplant; Hypothyroidism; Anemia; Antineutrophil cytoplasmic antibody positive vasculitis    Most recent EKG+ Tracin25    Most recent ECHO: 25 - [facility]    Most recent Cardiac Stress Test: 25 - [facility]    Most recent Coronary Angiogram: 3/20/25

## 2025-04-09 NOTE — TELEPHONE ENCOUNTER
Scheduled Pac appointment for pt to discuss what options there are to optimize his hemoglobin/platlets prior to surgery and during surgery. Pt is Jehov[employer] Witness.   Pt aware he needs to be in [place] at the time of his visit and agreed to that.

## 2025-04-14 ENCOUNTER — PRE VISIT (OUTPATIENT)
Dept: SURGERY | Facility: CLINIC | Age: 75
End: 2025-04-14

## 2025-04-14 ENCOUNTER — VIRTUAL VISIT (OUTPATIENT)
Dept: SURGERY | Facility: CLINIC | Age: 75
End: 2025-04-14
Payer: COMMERCIAL

## 2025-04-14 VITALS — HEIGHT: 70 IN | BODY MASS INDEX: 23.19 KG/M2 | WEIGHT: 162 LBS

## 2025-04-14 DIAGNOSIS — D63.1 ANEMIA DUE TO CHRONIC KIDNEY DISEASE, ON CHRONIC DIALYSIS (H): ICD-10-CM

## 2025-04-14 DIAGNOSIS — D50.8 OTHER IRON DEFICIENCY ANEMIA: Primary | ICD-10-CM

## 2025-04-14 DIAGNOSIS — Z01.818 PRE-OP EVALUATION: Primary | ICD-10-CM

## 2025-04-14 DIAGNOSIS — N18.6 ANEMIA DUE TO CHRONIC KIDNEY DISEASE, ON CHRONIC DIALYSIS (H): ICD-10-CM

## 2025-04-14 DIAGNOSIS — N18.6 ANEMIA DUE TO CHRONIC KIDNEY DISEASE, ON CHRONIC DIALYSIS (H): Primary | ICD-10-CM

## 2025-04-14 DIAGNOSIS — Z99.2 ANEMIA DUE TO CHRONIC KIDNEY DISEASE, ON CHRONIC DIALYSIS (H): Primary | ICD-10-CM

## 2025-04-14 DIAGNOSIS — I25.10 CORONARY ARTERY DISEASE INVOLVING NATIVE CORONARY ARTERY OF NATIVE HEART WITHOUT ANGINA PECTORIS: ICD-10-CM

## 2025-04-14 DIAGNOSIS — D63.1 ANEMIA DUE TO CHRONIC KIDNEY DISEASE, ON CHRONIC DIALYSIS (H): Primary | ICD-10-CM

## 2025-04-14 DIAGNOSIS — Z99.2 ANEMIA DUE TO CHRONIC KIDNEY DISEASE, ON CHRONIC DIALYSIS (H): ICD-10-CM

## 2025-04-14 PROCEDURE — 98003 SYNCH AUDIO-VIDEO NEW HI 60: CPT | Performed by: NURSE PRACTITIONER

## 2025-04-14 RX ORDER — NITROGLYCERIN 0.4 MG/1
0.4 TABLET SUBLINGUAL EVERY 5 MIN PRN
COMMUNITY

## 2025-04-14 RX ORDER — CYANOCOBALAMIN (VITAMIN B-12) 500 MCG
400 LOZENGE ORAL PRN
COMMUNITY

## 2025-04-14 ASSESSMENT — ENCOUNTER SYMPTOMS
DYSRHYTHMIAS: 0
SEIZURES: 0

## 2025-04-14 ASSESSMENT — LIFESTYLE VARIABLES: TOBACCO_USE: 0

## 2025-04-14 ASSESSMENT — PAIN SCALES - GENERAL: PAINLEVEL_OUTOF10: NO PAIN (0)

## 2025-04-14 NOTE — CONSULTS
Anesthesia Consult Note      Reason for consult:   High Risk consult  No diagnosis found.      Date of Encounter: 4/14/2025  Referring Physician: Cardiovascular Surgery  Primary Care Physician:  Steffi Hatch  Cesar Rashid is a 74 year old who has been referred for a high risk anesthesia consultation for consideration of CABG with known anemia and is a practicing Orthodox.     The patient has end-stage renal disease on peritoneal dialysis who presented for coronary angiogram on 3/20/2025 as part of kidney transplant evaluation. He was found to have severe coronary artery disease and was referred to cardiothoracic surgery for CABG evaluation.     The patient presents to the PAC virtually today with his wife, Rafaela, comorbid conditions including h/o VF arrest in setting of acute STEMI of inferior wall with subsequent ICD placement,  ICM, HTN, HL, ANCA-associated vasculitis, bladder cancer ESRD on PD, pulmonary nodules anemia in CKD, and h/o b/l ANALISA.     Please note, when patient had his STEMI with VF arrest (9/2023), he was treated with shockwave lithotripsy and SURESH to RCA/RPLA. At that time, his LVEF of 45%. During his hospital stay he had an in-hospital VT arrest. Repeat coronary angio was completed that showed no evidence of In Stent restenosis. S/p ICD placement.     History of bleeding/coagulopathy  ASA    History of anesthesia issues in patient or 1st degree relative  No previous issues with anesthesia for the patient or a first degree relative  Past Medical History  Past Medical History:   Diagnosis Date    ANCA-associated vasculitis (H)     Bladder cancer (H)     CAD (coronary artery disease)     End stage renal disease (H)     HTN (hypertension)     Pulmonary nodules        Past Surgical History  Past Surgical History:   Procedure Laterality Date    CV CORONARY ANGIOGRAM N/A 3/20/2025    Procedure: Coronary Angiogram;  Surgeon: Pepito Helton MD;  Location: [facility]  CARDIAC CATH LAB    repaired ruptured globe Right     TOTAL HIP ARTHROPLASTY Left 04/29/2014    TOTAL HIP ARTHROPLASTY Right 09/02/2014    VASCULAR SURGERY      chest port, peritoneal dialysis catheter       Prior to Admission Medications  Current Outpatient Medications   Medication Sig Dispense Refill    aspirin (ASA) 81 MG chewable tablet Take 81 mg by mouth at bedtime.      carvedilol (COREG) 6.25 MG tablet Take 6.25 mg by mouth 2 times daily (with meals)      Coenzyme Q10 (COQ10 PO) Take by mouth every morning.      levothyroxine (SYNTHROID/LEVOTHROID) 200 MCG tablet Take 1 tablet by mouth every morning (before breakfast).      Naltrexone HCl, Pain, 4.5 MG CAPS Take 4.5 mg by mouth at bedtime.      rosuvastatin (CRESTOR) 10 MG tablet Take 10 mg by mouth at bedtime.      sevelamer HCl (RENAGEL) 800 MG tablet Take 800 mg by mouth 3 times daily (with meals)      UNABLE TO FIND Inject 120 mcg subcutaneously every 30 days. MEDICATION NAME: Micera is given monthly as needed based on patient's Hgb results.      UNABLE TO FIND Take 2 capsules by mouth daily. MEDICATION NAME: arterosil      UNABLE TO FIND Take 3 tablets by mouth daily. MEDICATION NAME: Cardiogenics supplement      UNABLE TO FIND Take 2 capsules by mouth daily. MEDICATION NAME: durable heart supplement      vitamin E 400 units TABS Take 400 Units by mouth as needed.      nitroGLYcerin (NITROSTAT) 0.4 MG sublingual tablet Place 0.4 mg under the tongue every 5 minutes as needed.           Allergies  Allergies   Allergen Reactions    Amlodipine Palpitations       Social History  Social History     Socioeconomic History    Marital status:      Spouse name: Not on file    Number of children: Not on file    Years of education: Not on file    Highest education level: Not on file   Occupational History    Not on file   Tobacco Use    Smoking status: Never    Smokeless tobacco: Never   Substance and Sexual Activity    Alcohol use: Not Currently     Comment:  Very little - maybe 2 drinks a year    Drug use: Never    Sexual activity: Not on file   Other Topics Concern    Parent/sibling w/ CABG, MI or angioplasty before 65F 55M? No   Social History Narrative    Not on file     Social Drivers of Health     Financial Resource Strain: Not on File (2024)    Received from Commissioner    Financial Resource Strain     Financial Resource Strain: 0   Food Insecurity: Not on File (2024)    Received from Commissioner    Food Insecurity     Food: 0   Transportation Needs: Not on File (2024)    Received from Commissioner    Transportation Needs     Transportation: 0   Physical Activity: Not on File (2024)    Received from Commissioner    Physical Activity     Physical Activity: 0   Stress: Not on File (2024)    Received from Commissioner    Stress     Stress: 0   Social Connections: Not on File (2024)    Received from Commissioner    Social Connections     Connectedness: 0   Interpersonal Safety: Not on file   Housing Stability: Not on File (2024)    Received from Commissioner    Housing Stability     Housin       Family History  Family History   Problem Relation Age of Onset    Colon Cancer Mother 80    Coronary Artery Disease Mother     Diabetes Mother     Coronary Artery Disease Father     Thyroid Disease Father     Alcoholism Brother     Diabetes Brother        The complete review of systems is negative other than noted in the HPI or here. Anesthesia Evaluation   Pt has had prior anesthetic. Type: MAC and General.    No history of anesthetic complications       ROS/MED HX  ENT/Pulmonary: Comment: Pulmonary nodule     (+)     YUSEF risk factors,  hypertension,                              (-) tobacco use and asthma   Neurologic:    (-) no seizures, no CVA, no TIA and migraines   Cardiovascular: Comment: STEMI with VF arrest (2023) treated with shockwave lithotripsy and SURESH to RCA/RPLA. During his hospital stay he had an in-hospital VT arrest. Repeat coronary angio was completed that showed no  evidence of In Stent restenosis. S/p ICD placement.       Recent coronary angiogram showing multivessel coronary artery disease.     ANCA-associated vasculitis    (+) Dyslipidemia hypertension- -  CAD - past MI - stent-   Taking blood thinners   CHF etiology: ICM Last EF: 60 date: 1/2025          ICD Reason placed:ICM.  type;[employer] per card that patient has in his wallet.            Previous cardiac testing   Echo: Date: 7/29/2024 Results:  Echocardiogram with two-dimensional, color and spectral Doppler performed.  ______________________________________________________________________________  Interpretation Summary  Global and regional left ventricular function is normal with an EF of 55-60%.  Global right ventricular function is normal. The right ventricle is normal  size.  No significant valvular abnormalities present.  There is mild dilation at the level of the ascending aorta (4.2 cm, indexed  value 2.21 cm/m2).  IVC diameter <2.1 cm collapsing >50% with sniff suggests a normal RA pressure  of 3 mmHg.  There is no prior study for direct comparison.    Stress Test:  Date: 1/2025 Results:  Summary    1. The patient exercised for  7 minute(s) on the  Kahlil protocol and achieved 82 % of maximum predicted HR.  Study was diagnostic with mild reduction in sensitivity given marginally below HR target.     2. Normal functional capacity for age.     3. Based on Mark Score of 7 the patient is at Low risk for cardiovascular events.     4. The patient experienced no symptoms during the stress test.     5. Negative stress ECG for ST segment depression- intermittent inferior T wave inversions noted.     6. Normal left ventricular systolic function with very mild basal inferior and inferolateral wall motion abnormalities noted at rest.   LVEF 60%.     7. The mid to distal inferior wall(s) are hypokinetic post stress, with more hypokinesis in a larger territory (basal to mid) compared to the rest images.     8. A prior study  is not available for comparison.       Report Signatures   Stress ECG Finalized by Phoenix Poole MD on 01/23/2025 09:55 AM   Echo Finalized by Phoenix Poole MD on 01/23/2025 09:55 AM     ECG Reviewed:  Date: Results:    Cath:  Date: 3/2025 Results:  Conclusion          Ost LM to Mid LM lesion is 40% stenosed.     Ost LAD to Prox LAD lesion is 40% stenosed.     Prox LAD to Mid LAD lesion is 70% stenosed.     Mid LAD lesion is 70% stenosed.     1st Diag lesion is 50% stenosed.     Prox Cx lesion is 80% stenosed.     1st Mrg lesion is 40% stenosed.     Mid Cx to Dist Cx lesion is 70% stenosed.     Prox RCA lesion is 70% stenosed.     Prox RCA to Mid RCA lesion is 100% stenosed.     1. Moderate lesion of the proximal left main coronary artery.  2. Moderate diffuse disease of the left anterior descending artery. There are severe tandem 70% obstructive lesions distal to the takeoff of the first diagonal.  3. There is an 80% obstructive lesion of the proximal left circumflex artery as well as a 70% obstructive lesion distal to the takeoff of OM2.  4. There is a focal 70% obstructive lesion of the proximal right coronary artery. The previously placed drug-eluting stent has a 100% chronic in-stent restenosis. The distal vessel fills via R-R and L-R collaterals.  5. Successful uncomplicated right radial arterial access with hemostasis by TR band placement.        Plan      Follow bedrest per protocol    Continued medical management and lifestyle modifications for cardiovascular risk factor optimizations.    Discharge today per protocol        Referral to cardiothoracic surgery for CABG evaluation.     (-) arrhythmias and irregular heartbeat/palpitations   METS/Exercise Tolerance:  Comment: Walks for exercise about a mile without any cardiac symptoms.  Works around yard.   Hematologic: Comments: Muslim    (+)      anemia (In CKD),       (-) history of blood clots and history of blood transfusion    Musculoskeletal:   (+)  arthritis (b/l ANALISA),             GI/Hepatic:    (-) GERD and liver disease   Renal/Genitourinary:     (+) renal disease, type: ESRD, Pt requires dialysis, type: Peritoneal dialysis,       (-) BPH   Endo:     (+)          thyroid problem, hypothyroidism,        (-) Type I DM, Type II DM, chronic steroid usage and obesity   Psychiatric/Substance Use:    (-) psychiatric history, alcohol abuse history and chronic opioid use history   Infectious Disease:  - neg infectious disease ROS     Malignancy:   (+) Malignancy, History of Other.Other CA Bladder status post.    Other:  - neg other ROS    (+)  , no H/O Chronic Pain,           Virtual visit -  No vitals were obtained    Physical Exam  Constitutional: Awake, alert, cooperative, no apparent distress, and appears stated age.  Eyes: Pupils equal  HENT: Normocephalic  Respiratory: non labored breathing   Neurologic: Awake, alert, oriented to name, place and time.   Neuropsychiatric: Calm, cooperative. Normal affect.      Labs / testing: (personally reviewed)   Latest Reference Range & Units 03/20/25 10:53   Sodium 135 - 145 mmol/L 136   Potassium 3.4 - 5.3 mmol/L 4.9   Chloride 98 - 107 mmol/L 96 (L)   Carbon Dioxide (CO2) 22 - 29 mmol/L 27   Urea Nitrogen 8.0 - 23.0 mg/dL 52.7 (H)   Creatinine 0.67 - 1.17 mg/dL 14.00 (H)   GFR Estimate >60 mL/min/1.73m2 3 (L)   Calcium 8.8 - 10.4 mg/dL 8.7 (L)   Anion Gap 7 - 15 mmol/L 13   Albumin 3.5 - 5.2 g/dL 3.6   Protein Total 6.4 - 8.3 g/dL 7.0   Alkaline Phosphatase 40 - 150 U/L 79   ALT 0 - 70 U/L 23   AST 0 - 45 U/L 25   Bilirubin Direct 0.00 - 0.30 mg/dL 0.10   Bilirubin Total <=1.2 mg/dL 0.4   Glucose 70 - 99 mg/dL 89   WBC 4.0 - 11.0 10e3/uL 6.9   Hemoglobin 13.3 - 17.7 g/dL 11.9 (L)   Hematocrit 40.0 - 53.0 % 36.7 (L)   Platelet Count 150 - 450 10e3/uL 241   RBC Count 4.40 - 5.90 10e6/uL 3.76 (L)   MCV 78 - 100 fL 98   MCH 26.5 - 33.0 pg 31.6   MCHC 31.5 - 36.5 g/dL 32.4   RDW 10.0 - 15.0 % 15.3 (H)    ABO/Rh(D)  A POS   Antibody Screen Negative  Negative   SPECIMEN EXPIRATION DATE  [id number]   (L): Data is abnormally low  (H): Data is abnormally high    PROCEDURES  Cardiac Device Check   Eval Commentary 03/27/2025   Eval Commentary Routine remote transmission for this [employer] single chamber ICD.  -Programmed: VVI 40  -Presenting rhythm: VS w/PVCs - 84bpm  -Battery Status: 12.3 years  -Lead Status: Stable lead trending, within normal limits.  No concerns.  -Heart rate trending graphs show good rate variation.  -Pacing: <0.1%   -Atrial high rates: 0  -Ventricular high rates: 0  -ICD therapies: 0 since implant  -Optivol:  Stable lung fluid status.  Plan: No device concerns, home remote check every 3 months and return to clinic 10/2025 .  Indication: Cardiac Arrest  Jolynn Loza CDS    Pertinent Information:       Please see ROS for further cardiac testing results.   Outside records reviewed from:  Care Everywhere    Assessment    Cesar Rashid is a 74 year old male seen as a PAC referral for risk assessment and optimization for anesthesia.    Plan/Recommendations  Pt will be optimized for the proposed procedure.  See below for details on the assessment, risk, and preoperative recommendations    NEUROLOGY  - No history of TIA, CVA or seizure    -Post Op delirium risk factors:  High co-morbid index    ENT  - No current airway concerns.  Will need to be reassessed day of surgery.  Mallampati: Unable to assess  TM: Unable to assess    CARDIAC  - Known multivessel CAD on recent angiogram in setting of chronic anemia in patient with ESRD and Anglican.    ~ Anesthesia consultation for optimizing anemia preop  ~  Will CBC with reflex to iron studies >>pending results, patient may meet criteria for preoperative iron infusions.      - STEMI with VF arrest (9/2023) treated with shockwave lithotripsy and SURESH to RCA/RPLA,  ischemic cardiomyopathy with LVEF of 45%. During his hospital stay  "he had an in-hospital VT arrest. Repeat coronary angio was completed that showed no evidence of In Stent restenosis. S/p ICD placement.    ~ denies cardiac symptoms    ~ ASA and Statin    -  ischemic cardiomyopathy with recovery of LVEF 60% (1/2025)  S/p ICD placement and medical management   ~ Coreg     - ventricular tachycardia    ~ s/p ICD placement    - METS (Metabolic Equivalents)  Patient performs 4 or more METS exercise without symptoms             Total Score: 0        PULMONARY  - YUSEF Medium Risk             Total Score: 3    YUSEF: Hypertension    YUSEF: Over 50 ys old    YUSEF: Male      - Pulmonary nodules   sub 6-mm and benign appearing on 9/2024 CT chest.   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     ENDOCRINE   - BMI: Estimated body mass index is 23.58 kg/m  as calculated from the following:    Height as of this encounter: 1.765 m (5' 9.5\").    Weight as of this encounter: 73.5 kg (162 lb).  Healthy Weight (BMI 18.5-24.9)  - No history of Diabetes Mellitus    HEME  - Beta 2 glycoprotein 1 antibody +  No history of clotting  See Dr. Haque's note from 1/9/2025 for DVT prophylaxis recommendations    - VTE Low Risk 0.5%             Total Score: 3    VTE: Greater than 59 yrs old    VTE: Male      - Platelet dysfunction second to Aspirin (Jesenia, many others)    - Anemia in " ESRD  Patient is a practicing Christian  Patient will not accept blood transfusions but will accept cell saver.   Receives Mircera as needed ~30 days based on his Hgb.  Orders placed today:  CBC with reflex to iron studies, Vitamin B12 and reticulocyte count   Hemoglobin   Date Value Ref Range Status   03/20/2025 11.9 (L) 13.3 - 17.7 g/dL Final   ]    MSK  Patient is NOT Frail             Total Score: 0      - Osteoarthritis s/p b/l ANALISA    Discussed above patient with Dr. Rizvi.   Video-Visit Details    Type of service:  Video Visit    Provider received verbal consent for a Video Visit from the patient? Yes   Video Start Time: 09:00  Video End Time:9:20    Originating Location (pt. Location): Parked in their car in [place]    Distant Location (provider location):  Off-site  Mode of Communication:  Video Conference via Sosei  On the day of service:     Prep time: 18 minutes  Visit time: 20 minutes  Documentation time: 20 minutes  Coordinating care: 10 minutes   ------------------------------------------  Total time: 68 minutes    MARY ELLEN Lugo CNP    Preoperative Assessment Center  [facility]  Office phone: [phone]  Fax: [phone]

## 2025-04-14 NOTE — PROGRESS NOTES
Rob is a 74 year old who is being evaluated via a billable video visit.    How would you like to obtain your AVS? [employer]  If the video visit is dropped, the invitation should be resent by: Text to cell phone: [phone]

## 2025-04-15 ENCOUNTER — PREP FOR PROCEDURE (OUTPATIENT)
Dept: CARDIOLOGY | Facility: CLINIC | Age: 75
End: 2025-04-15
Payer: COMMERCIAL

## 2025-04-15 ENCOUNTER — TELEPHONE (OUTPATIENT)
Dept: CARDIOLOGY | Facility: CLINIC | Age: 75
End: 2025-04-15
Payer: COMMERCIAL

## 2025-04-15 ENCOUNTER — MYC MEDICAL ADVICE (OUTPATIENT)
Dept: CARDIOLOGY | Facility: CLINIC | Age: 75
End: 2025-04-15
Payer: COMMERCIAL

## 2025-04-15 DIAGNOSIS — I25.10 CAD (CORONARY ARTERY DISEASE): ICD-10-CM

## 2025-04-15 DIAGNOSIS — C67.2 MALIGNANT NEOPLASM OF LATERAL WALL OF URINARY BLADDER (H): Primary | ICD-10-CM

## 2025-04-21 ENCOUNTER — DOCUMENTATION ONLY (OUTPATIENT)
Dept: OTHER | Facility: CLINIC | Age: 75
End: 2025-04-21
Payer: COMMERCIAL

## 2025-04-24 NOTE — TELEPHONE ENCOUNTER
Called patient in regards to prior auth questions. Message prior auth pools in regards to insurance questions, writer unable to answer questions in regards to insurance

## 2025-04-28 LAB
ABO + RH BLD: NORMAL
BLD GP AB SCN SERPL QL: NEGATIVE
SPECIMEN EXP DATE BLD: NORMAL

## 2025-04-29 ENCOUNTER — TELEPHONE (OUTPATIENT)
Dept: CARDIOLOGY | Facility: CLINIC | Age: 75
End: 2025-04-29

## 2025-04-29 ENCOUNTER — TELEPHONE (OUTPATIENT)
Dept: CARDIOLOGY | Facility: CLINIC | Age: 75
End: 2025-04-29
Payer: COMMERCIAL

## 2025-04-29 ENCOUNTER — TELEPHONE (OUTPATIENT)
Dept: SURGERY | Facility: CLINIC | Age: 75
End: 2025-04-29

## 2025-04-29 ENCOUNTER — LAB (OUTPATIENT)
Dept: LAB | Facility: CLINIC | Age: 75
End: 2025-04-29
Payer: COMMERCIAL

## 2025-04-29 DIAGNOSIS — D50.8 IRON DEFICIENCY ANEMIA SECONDARY TO INADEQUATE DIETARY IRON INTAKE: Primary | ICD-10-CM

## 2025-04-29 DIAGNOSIS — C67.2 MALIGNANT NEOPLASM OF LATERAL WALL OF URINARY BLADDER (H): Primary | ICD-10-CM

## 2025-04-29 DIAGNOSIS — Z99.2 ANEMIA DUE TO CHRONIC KIDNEY DISEASE, ON CHRONIC DIALYSIS (H): ICD-10-CM

## 2025-04-29 DIAGNOSIS — D50.8 OTHER IRON DEFICIENCY ANEMIA: ICD-10-CM

## 2025-04-29 DIAGNOSIS — D63.1 ANEMIA DUE TO CHRONIC KIDNEY DISEASE, ON CHRONIC DIALYSIS (H): ICD-10-CM

## 2025-04-29 DIAGNOSIS — N18.6 ANEMIA DUE TO CHRONIC KIDNEY DISEASE, ON CHRONIC DIALYSIS (H): ICD-10-CM

## 2025-04-29 LAB
ALBUMIN SERPL BCG-MCNC: 3.2 G/DL (ref 3.5–5.2)
ALBUMIN UR-MCNC: 100 MG/DL
ALP SERPL-CCNC: 78 U/L (ref 40–150)
ALT SERPL W P-5'-P-CCNC: 20 U/L (ref 0–70)
ANION GAP SERPL CALCULATED.3IONS-SCNC: 11 MMOL/L (ref 7–15)
APPEARANCE UR: CLEAR
APTT PPP: 27 SECONDS (ref 22–38)
AST SERPL W P-5'-P-CCNC: 26 U/L (ref 0–45)
BILIRUB SERPL-MCNC: 0.2 MG/DL
BILIRUB UR QL STRIP: NEGATIVE
BUN SERPL-MCNC: 59 MG/DL (ref 8–23)
CALCIUM SERPL-MCNC: 8.3 MG/DL (ref 8.8–10.4)
CHLORIDE SERPL-SCNC: 93 MMOL/L (ref 98–107)
COLOR UR AUTO: YELLOW
CREAT SERPL-MCNC: 12.01 MG/DL (ref 0.67–1.17)
EGFRCR SERPLBLD CKD-EPI 2021: 4 ML/MIN/1.73M2
ERYTHROCYTE [DISTWIDTH] IN BLOOD BY AUTOMATED COUNT: 14.2 % (ref 10–15)
EST. AVERAGE GLUCOSE BLD GHB EST-MCNC: 103 MG/DL
FERRITIN SERPL-MCNC: 1174 NG/ML (ref 31–409)
GLUCOSE SERPL-MCNC: 104 MG/DL (ref 70–99)
GLUCOSE UR STRIP-MCNC: NEGATIVE MG/DL
HBA1C MFR BLD: 5.2 % (ref 0–5.6)
HCO3 SERPL-SCNC: 32 MMOL/L (ref 22–29)
HCT VFR BLD AUTO: 32.9 % (ref 40–53)
HGB BLD-MCNC: 10.9 G/DL (ref 13.3–17.7)
HGB UR QL STRIP: ABNORMAL
INR PPP: 1.09 (ref 0.85–1.15)
IRON BINDING CAPACITY (ROCHE): 216 UG/DL (ref 240–430)
IRON SATN MFR SERPL: 52 % (ref 15–46)
IRON SERPL-MCNC: 112 UG/DL (ref 61–157)
KETONES UR STRIP-MCNC: NEGATIVE MG/DL
LEUKOCYTE ESTERASE UR QL STRIP: NEGATIVE
MAGNESIUM SERPL-MCNC: 3.5 MG/DL (ref 1.7–2.3)
MCH RBC QN AUTO: 31.2 PG (ref 26.5–33)
MCHC RBC AUTO-ENTMCNC: 33.1 G/DL (ref 31.5–36.5)
MCV RBC AUTO: 94 FL (ref 78–100)
NITRATE UR QL: NEGATIVE
PH UR STRIP: 8.5 [PH] (ref 5–7)
PLATELET # BLD AUTO: 273 10E3/UL (ref 150–450)
POTASSIUM SERPL-SCNC: 4.9 MMOL/L (ref 3.4–5.3)
PROT SERPL-MCNC: 5.8 G/DL (ref 6.4–8.3)
PROTHROMBIN TIME: 14 SECONDS (ref 11.8–14.8)
RBC # BLD AUTO: 3.49 10E6/UL (ref 4.4–5.9)
RBC #/AREA URNS AUTO: ABNORMAL /HPF
RETICS # AUTO: 0.03 10E6/UL (ref 0.03–0.1)
RETICS/RBC NFR AUTO: 0.9 % (ref 0.5–2)
SODIUM SERPL-SCNC: 136 MMOL/L (ref 135–145)
SP GR UR STRIP: 1.01 (ref 1–1.03)
UROBILINOGEN UR STRIP-ACNC: 0.2 E.U./DL
WBC # BLD AUTO: 5.4 10E3/UL (ref 4–11)
WBC #/AREA URNS AUTO: ABNORMAL /HPF

## 2025-04-29 PROCEDURE — 83735 ASSAY OF MAGNESIUM: CPT

## 2025-04-29 PROCEDURE — 82728 ASSAY OF FERRITIN: CPT

## 2025-04-29 PROCEDURE — 85045 AUTOMATED RETICULOCYTE COUNT: CPT

## 2025-04-29 PROCEDURE — 36415 COLL VENOUS BLD VENIPUNCTURE: CPT

## 2025-04-29 PROCEDURE — 86850 RBC ANTIBODY SCREEN: CPT

## 2025-04-29 PROCEDURE — 83540 ASSAY OF IRON: CPT

## 2025-04-29 PROCEDURE — 85610 PROTHROMBIN TIME: CPT

## 2025-04-29 PROCEDURE — 86901 BLOOD TYPING SEROLOGIC RH(D): CPT

## 2025-04-29 PROCEDURE — 85027 COMPLETE CBC AUTOMATED: CPT

## 2025-04-29 PROCEDURE — 82607 VITAMIN B-12: CPT

## 2025-04-29 PROCEDURE — 85730 THROMBOPLASTIN TIME PARTIAL: CPT

## 2025-04-29 PROCEDURE — 80053 COMPREHEN METABOLIC PANEL: CPT

## 2025-04-29 PROCEDURE — 84134 ASSAY OF PREALBUMIN: CPT

## 2025-04-29 PROCEDURE — 86900 BLOOD TYPING SEROLOGIC ABO: CPT

## 2025-04-29 PROCEDURE — 83036 HEMOGLOBIN GLYCOSYLATED A1C: CPT | Mod: GZ

## 2025-04-29 PROCEDURE — 83550 IRON BINDING TEST: CPT

## 2025-04-29 PROCEDURE — 81001 URINALYSIS AUTO W/SCOPE: CPT

## 2025-04-29 RX ORDER — MEPERIDINE HYDROCHLORIDE 25 MG/ML
25 INJECTION INTRAMUSCULAR; INTRAVENOUS; SUBCUTANEOUS
OUTPATIENT
Start: 2025-04-29

## 2025-04-29 RX ORDER — ALBUTEROL SULFATE 0.83 MG/ML
2.5 SOLUTION RESPIRATORY (INHALATION)
OUTPATIENT
Start: 2025-04-29

## 2025-04-29 RX ORDER — EPINEPHRINE 1 MG/ML
0.3 INJECTION, SOLUTION INTRAMUSCULAR; SUBCUTANEOUS EVERY 5 MIN PRN
OUTPATIENT
Start: 2025-04-29

## 2025-04-29 RX ORDER — DIPHENHYDRAMINE HYDROCHLORIDE 50 MG/ML
25 INJECTION, SOLUTION INTRAMUSCULAR; INTRAVENOUS
Start: 2025-04-29

## 2025-04-29 RX ORDER — HEPARIN SODIUM,PORCINE 10 UNIT/ML
5-20 VIAL (ML) INTRAVENOUS DAILY PRN
OUTPATIENT
Start: 2025-04-29

## 2025-04-29 RX ORDER — HEPARIN SODIUM (PORCINE) LOCK FLUSH IV SOLN 100 UNIT/ML 100 UNIT/ML
5 SOLUTION INTRAVENOUS
OUTPATIENT
Start: 2025-04-29

## 2025-04-29 RX ORDER — METHYLPREDNISOLONE SODIUM SUCCINATE 40 MG/ML
40 INJECTION INTRAMUSCULAR; INTRAVENOUS
Start: 2025-04-29

## 2025-04-29 RX ORDER — DIPHENHYDRAMINE HYDROCHLORIDE 50 MG/ML
50 INJECTION, SOLUTION INTRAMUSCULAR; INTRAVENOUS
Start: 2025-04-29

## 2025-04-29 RX ORDER — ALBUTEROL SULFATE 90 UG/1
1-2 INHALANT RESPIRATORY (INHALATION)
Start: 2025-04-29

## 2025-04-29 NOTE — TELEPHONE ENCOUNTER
Per discussion with Dr Huerta and writer.    Dr Huerta would like patient to hold aspirin a week prior to surgery and have patient receive iron infusions.

## 2025-04-29 NOTE — TELEPHONE ENCOUNTER
Rebeca Rosas APRN CNP Ung, Katie, BIJU; Jolynn Walter RN  Cc: Mendel Rizvi MD  [facility],    Yes, the orders have been placed and signed.      Jolynn Walter, the PAC RNCC, is working on arranging the iron infusions ASAP with the patient hopefully at [facility] in [facility].  The patient will also be getting his labs that I ordered and Dr. Huerta's orders at the the [facility].    The patient will need an order for T&S for DOS unless he is in the cities prior to 5/9/2025 as [facility] is not connected with the [place] blood bank. Please place that order for DOS.    Let me know if you have other questions.    Thanks,  Rebeca          Previous Messages       ----- Message -----  From: Zoila Aggarwal, BIJU  Sent: 4/29/2025   2:11 PM CDT  To: MARY ELLEN Miranda CNP; *  Subject: RE: Anemia, upcoming CABG and Jewhova's Witn*    Thank you for the quick reply. Just to close the loop and confirm. Will you and Dr Rizvi be putting in the infusions orders in?  ----- Message -----  From: Rebeca Rosas APRN CNP  Sent: 4/29/2025   2:02 PM CDT  To: Zoila Aggarwal RN; Mendel Rizvi MD  Subject: Anemia, upcoming CABG and Jewhova's Witness      Hansel Cummings,    Thank you for your note.  Dr. Rizvi and I recently discussed Mr. Rashid's case.  The original plan was to have the patient get labs >>CBC with iron studies and then set up for iron infusions.  Given he patient had not made a final decision re: surgery, he did not get his labs done.  Also, he was not set up for iron infusions given there was not a surgery date.    We are currently working on getting him set up for labs today and iron infusions X2 prior to surgery on 5/9/2025.  Ideally, we would like to get the iron infusion within the next 24 hours and then the 2nd infusion next week.    Thanks again for your note.  Please let me know if you have any other questions.    Best,  Rebeca  ----- Message -----  From: Ziola Aggarwal RN  Sent: 4/29/2025   1:54 PM  CDT  To: MARY ELLEN Miranda CNP    Hello!    This patient saw you in PAC prior to having his surgical date. Originally we wanted him to see PAC to discuss what options there are to optimize his hemoglobin/platlets prior to surgery and during surgery. I talked to Dr Huerta (Heart surgeon) about this patient and ideally he would want Iron infusions, but wanted the providers at PAC input too. He is currently scheduled for surgery for 5/9, do you have any recommendations to optimizing his hemoglobin prior to surgery?    Thank you  Zoila Aggarwal RN  Cardiothoracic Surgery  [phone]

## 2025-04-29 NOTE — PROGRESS NOTES
HPI:     Cesar Rashid is a 74 y.o. male seen for follow-up CAD and cardiovascular evaluation in anticipation renal transplant.    He was last seen in cardiology clinic on 5/6/2024 by Kitty Pope PA-C for routine follow-up.  In review, he has history of end-stage renal disease on peritoneal dialysis, anemia of chronic disease, hypertension, hypothyroidism, CAD with inferior STEMI and VF cardiac arrest 9/6/23 s/p shockwave lithotripsy and SURESH to RCA and SURESH x1 to RPLA, ischemic cardiomyopathy with LVEF of 45%, on amiodarone for VT.  Follow-up echo 11/18/2024 showed preserved LV systolic function, EF 60% significant. Amiodarone was reduced to 100 mg daily per EP recommendations at last visit given no recurrent VT device checks.     When he was last seen in May 2024, he had taken himself off of amiodarone, isosorbide mononitrate, hydralazine and clopidogrel. He has done well since then.  He is currently being considered for kidney transplant at the [facility]. 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 He denies any shortness of breath, chest pain, palpitations, lightheadedness/dizziness or lower extremity edema. He developed significant cramping in his hands to the point where he was unable to tie and neck tie while on atorvastatin. This all resolved when he stopped the medications.    He underwent coronary angiogram that showed showed severe CAD.                  Current Outpatient Medications   Medication Sig Dispense Refill    aspirin 81 MG chewable tablet Chew and swallow 1 Tablet (81 mg) by mouth daily. Indications: Subacute Stent Thrombosis Prevention 100 Tablet 3    carvedilol (COREG) 6.25 MG tablet take 1 tablet by mouth twice daily with meals 180 Tablet 2    levothyroxine (SYNTHROID) 200 MCG tablet Take 1 Tablet (200 mcg) by mouth daily.        levothyroxine (SYNTHROID) 25 MCG tablet Take 0.5 Tablets (12.5 mcg) by mouth daily.        lisinopril  (ZESTRIL) 5 MG tablet Take 1 Tablet (5 mg) by mouth daily.        Multiple Vitamins-Iron (MULTIPLE VITAMIN/IRON OR)          Naltrexone HCl, Pain, 4.5 MG CAPS a-naltrexone 4.5mg caps   TAKE ONE CAPSULE DAILY AT BEDTIME for inflammation        nitroglycerin (NITROSTAT) 0.4 MG sublingual tablet Place 1 Tablet (0.4 mg) under tongue every 5 minutes as needed for Chest Pain. If no relief after 5 min call 911;continue 1 tab every 5 min max 3 tab (Patient not taking: Reported on 12/4/2024) 15 Tablet 3    Omega 3-6-9 Fatty Acids (OMEGA-3-6-9 OR)          saline (OCEAN) 0.65 % nasal solution Nasal Spray (sodium chloride) 0.65 % aerosol   USE 4 DOSES IN EACH NOSTRIL 4 TIMES DAILY (Patient not taking: Reported on 4/16/2024)        sevelamer (RENAGEL) 800 MG tablet Take 3 Tablets (2,400 mg) by mouth three times a day with meals.          No current facility-administered medications for this visit.         Allergies and drug reactions:         Allergies   Allergen Reactions    Amlodipine Palpitations    Levofloxacin Other, see comments                    PSYCHOSOCIAL HISTORY  Social History           Substance and Sexual Activity   Alcohol Use Yes     Comment: every once in a while 1 beer      Social History          Tobacco Use   Smoking Status Never   Smokeless Tobacco Never      Social History          Substance and Sexual Activity   Drug Use Not on file          FAMILY HISTORY:         Family History   Problem Relation Name Age of Onset    Diabetes Other        Cataract Other        Glaucoma Other        Diabetes, Type II Birth Mother        Cancer, Other Birth Mother             colon    Coronary Artery Disease Birth Mother        Coronary Artery Disease Birth Father        Thyroid Disorder Birth Father        Alcohol Abuse Brother        Other (Other) Brother             car accident    Diabetes, Type II Brother        Anesthesia Reaction Negative Family History             There is otherwise no family history of premature  "CAD, cardiomyopathy or sudden cardiac death.     REVIEW OF SYSTEMS:   Pertinent review of systems are noted in the HPI above.  All other review of systems are negative.     PHYSICAL EXAMINATION:      Filed Vitals:     12/04/24 0955   BP: (!) 152/84   Pulse: 63   Resp: 16   SpO2: 99%   Weight: 173 lb 14.4 oz (78.9 kg)      Estimated body mass index is 24.25 kg/m  as calculated from the following:    Height as of 9/7/23: 5' 11\" (1.803 m).    Weight as of this encounter: 173 lb 14.4 oz (78.9 kg).      Constitutional: Comfortable and in no distress.   Eyes: No icterus.   HENT: Head: Normocephalic, no masses.   Pulmonary:  Chest symmetric, lungs clear bilaterally and no crackles, wheezes or rales.   Cardiovascular: RRR with normal S1 and S2, no murmur, JVP normal.   Gastrointestinal: Normal bowel sounds, non-tender.   Musculoskeletal: Edema of the lower extremities: None.   Skin:  Normal skin color, texture, and turgor.     Neurologic: Oriented and appropriate without obvious focal deficits.    Psychiatric: Normal affect.          REVIEW OF LABORATORY, PATHOLOGY, AND RADIOLOGY DATA:  Lab results:         Lab Results   Component Value Date/Time     BUN 54 (H) 09/11/2023 08:06 AM     SODIUM 135 (L) 09/11/2023 08:06 AM     K 4.2 09/11/2023 08:06 AM     CHLORIDE 95 (L) 09/11/2023 08:06 AM     CO2 25 12/19/2019 04:11 PM     GLUCOSE 91 09/11/2023 08:06 AM     CREATININE 14.17 (HH) 09/11/2023 08:06 AM     GFR 3 (L) 09/11/2023 08:06 AM            Lab Results   Component Value Date/Time     WBC 5.4 01/02/2024 09:37 AM     RBC 3.82 (L) 01/02/2024 09:37 AM     HGB 12.3 (L) 01/02/2024 09:37 AM     HCT 36.3 (L) 01/02/2024 09:37 AM     MCV 95.0 01/02/2024 09:37 AM     MCH 32.2 01/02/2024 09:37 AM     MCHC 33.9 01/02/2024 09:37 AM     PLTS 214 01/02/2024 09:37 AM            Lab Results   Component Value Date/Time     TROP 188.90 (H) 09/07/2023 11:08 PM     TROP 204.09 (H) 09/07/2023 08:14 PM     TROP 352.86 (H) 09/06/2023 03:25 PM    " "  No results found for: \"PROBNP\"      Lipid Panel with liver enzymes:        Cholesterol   Date Value Ref Range Status   01/02/2024 153 0 - 199 mg/dL Final   09/06/2023 159 0 - 199 mg/dL Final            HDL Cholesterol   Date Value Ref Range Status   01/02/2024 52 >=40 mg/dL Final   09/06/2023 40 >=40 mg/dL Final            LDL, Calculated   Date Value Ref Range Status   01/02/2024 81 <130 mg/dL Final   09/06/2023 110 <130 mg/dL Final            Triglyceride   Date Value Ref Range Status   01/02/2024 99 <=149 mg/dL Final   09/06/2023 46 <=149 mg/dL Final            AST (SGOT)   Date Value Ref Range Status   09/10/2023 40 10 - 40 U/L Final            ALT (SGPT)   Date Value Ref Range Status   09/10/2023 37 <=55 U/L Final            Bilirubin, Total   Date Value Ref Range Status   09/10/2023 0.4 0.2 - 1.2 mg/dL Final         Other lab results:        TSH, Sensitive   Date Value Ref Range Status   09/07/2023 2.29 0.30 - 4.50 uIU/mL Final            INR   Date Value Ref Range Status   09/11/2023 1.1 0.9 - 1.1 Final       Echo: Date: 7/29/2024 Results:  Echocardiogram with two-dimensional, color and spectral Doppler performed.  ______________________________________________________________________________  Interpretation Summary  Global and regional left ventricular function is normal with an EF of 55-60%.  Global right ventricular function is normal. The right ventricle is normal  size.  No significant valvular abnormalities present.  There is mild dilation at the level of the ascending aorta (4.2 cm, indexed  value 2.21 cm/m2).  IVC diameter <2.1 cm collapsing >50% with sniff suggests a normal RA pressure  of 3 mmHg.  There is no prior study for direct comparison.     Stress Test:  Date: 1/2025 Results:  Summary    1. The patient exercised for  7 minute(s) on the  Kahlil protocol and achieved 82 % of maximum predicted HR.  Study was diagnostic with mild reduction in sensitivity given marginally below HR target.     2. " Normal functional capacity for age.     3. Based on Mark Score of 7 the patient is at Low risk for cardiovascular events.     4. The patient experienced no symptoms during the stress test.     5. Negative stress ECG for ST segment depression- intermittent inferior T wave inversions noted.     6. Normal left ventricular systolic function with very mild basal inferior and inferolateral wall motion abnormalities noted at rest.   LVEF 60%.     7. The mid to distal inferior wall(s) are hypokinetic post stress, with more hypokinesis in a larger territory (basal to mid) compared to the rest images.     8. A prior study is not available for comparison.         Report Signatures   Stress ECG Finalized by Phoenix Poole MD on 01/23/2025 09:55 AM   Echo Finalized by Phoenix Poole MD on 01/23/2025 09:55 AM      ECG Reviewed:  Date: Results:     Cath:  Date: 3/2025 Results:  Conclusion           Ost LM to Mid LM lesion is 40% stenosed.     Ost LAD to Prox LAD lesion is 40% stenosed.     Prox LAD to Mid LAD lesion is 70% stenosed.     Mid LAD lesion is 70% stenosed.     1st Diag lesion is 50% stenosed.     Prox Cx lesion is 80% stenosed.     1st Mrg lesion is 40% stenosed.     Mid Cx to Dist Cx lesion is 70% stenosed.     Prox RCA lesion is 70% stenosed.     Prox RCA to Mid RCA lesion is 100% stenosed.     1.Moderate lesion of the proximal left main coronary artery.  2.Moderate diffuse disease of the left anterior descending artery. There are severe tandem 70% obstructive lesions distal to the takeoff of the first diagonal.  3.There is an 80% obstructive lesion of the proximal left circumflex artery as well as a 70% obstructive lesion distal to the takeoff of OM2.  4.There is a focal 70% obstructive lesion of the proximal right coronary artery. The previously placed drug-eluting stent has a 100% chronic in-stent restenosis. The distal vessel fills via R-R and L-R collaterals.  5.Successful uncomplicated right radial  arterial access with hemostasis by TR band placement.       Assessment     Cesar Rashid is a 74 year old male with severe CAD who is referred for CABG. I agree with the recommendation to consider him for CABG. I discussed the risks and benefits of surgery including the risks of death, stroke, bleeding, infection, renal failure and arrhythmias. They understand and are willing to consider it. Patient is a Anglican and we will refer to the PAC clinic for optimization.

## 2025-04-29 NOTE — TELEPHONE ENCOUNTER
Updated Rob of his iron infusion appointment on 5/6 at 1:30 pm at [facility] infusion clinic.  Also sent him the details to his my chart, Rob expressed understanding.  Jolynn Walter RN

## 2025-04-30 DIAGNOSIS — N18.6 END STAGE RENAL DISEASE (H): Primary | ICD-10-CM

## 2025-04-30 DIAGNOSIS — C67.2 MALIGNANT NEOPLASM OF LATERAL WALL OF URINARY BLADDER (H): Primary | ICD-10-CM

## 2025-04-30 DIAGNOSIS — D50.9 IRON DEFICIENCY ANEMIA, UNSPECIFIED IRON DEFICIENCY ANEMIA TYPE: Primary | ICD-10-CM

## 2025-04-30 LAB
PREALB SERPL-MCNC: 29.8 MG/DL (ref 20–40)
VIT B12 SERPL-MCNC: 1104 PG/ML (ref 232–1245)

## 2025-04-30 RX ORDER — DIPHENHYDRAMINE HYDROCHLORIDE 50 MG/ML
25 INJECTION, SOLUTION INTRAMUSCULAR; INTRAVENOUS
Start: 2025-04-30

## 2025-04-30 RX ORDER — HEPARIN SODIUM (PORCINE) LOCK FLUSH IV SOLN 100 UNIT/ML 100 UNIT/ML
5 SOLUTION INTRAVENOUS
OUTPATIENT
Start: 2025-04-30

## 2025-04-30 RX ORDER — MEPERIDINE HYDROCHLORIDE 25 MG/ML
25 INJECTION INTRAMUSCULAR; INTRAVENOUS; SUBCUTANEOUS
OUTPATIENT
Start: 2025-04-30

## 2025-04-30 RX ORDER — METHYLPREDNISOLONE SODIUM SUCCINATE 40 MG/ML
40 INJECTION INTRAMUSCULAR; INTRAVENOUS
Start: 2025-04-30

## 2025-04-30 RX ORDER — EPINEPHRINE 1 MG/ML
0.3 INJECTION, SOLUTION INTRAMUSCULAR; SUBCUTANEOUS EVERY 5 MIN PRN
OUTPATIENT
Start: 2025-04-30

## 2025-04-30 RX ORDER — DIPHENHYDRAMINE HYDROCHLORIDE 50 MG/ML
50 INJECTION, SOLUTION INTRAMUSCULAR; INTRAVENOUS
Start: 2025-04-30

## 2025-04-30 RX ORDER — ALBUTEROL SULFATE 0.83 MG/ML
2.5 SOLUTION RESPIRATORY (INHALATION)
OUTPATIENT
Start: 2025-04-30

## 2025-04-30 RX ORDER — HEPARIN SODIUM,PORCINE 10 UNIT/ML
5-20 VIAL (ML) INTRAVENOUS DAILY PRN
OUTPATIENT
Start: 2025-04-30

## 2025-04-30 RX ORDER — ALBUTEROL SULFATE 90 UG/1
1-2 INHALANT RESPIRATORY (INHALATION)
Start: 2025-04-30

## 2025-04-30 NOTE — TELEPHONE ENCOUNTER
FUTURE VISIT INFORMATION      SURGERY INFORMATION:  Date: 5/22/2025   Location: [facility] OR   Surgeon:  Ridge Huerta MD   Anesthesia Type:  General   Procedure: CORONARY ARTERY BYPASS GRAFT AND ANY ASSOCIATED PROCEDURES   Consult: 4/23/25    RECORDS REQUESTED FROM:       Primary Care Provider: Steffi Hatch MD    Pertinent Medical History: CAD (coronary artery disease); ANCA-associated vasculitis (H); Bladder cancer (H); End stage renal disease (H); HTN; Pulmonary nodules; Coronary artery disease involving native coronary artery of native heart without angina pectoris; Iron deficiency anemia secondary to inadequate dietary iron intake;     Most recent EKG+ Tracing: 3/20/25    Most recent ECHO: 11/18/24 - [facility]    Most recent [facility]    Most recent Coronary Angiogram: 3/20/25

## 2025-04-30 NOTE — TELEPHONE ENCOUNTER
Mendel Rizvi MD White, Jennifer, RN  Cc: Zoila Aggarwal, RN; Rebeca Rosas APRN CNP; Ridge Huerta MD Jennifer - after talking to another hematologist, and given the limitations we have regarding scheduling at the infusion centers, I would like to do the following plan:  -keep him scheduled for the appointments you have (you didn't clarify, but I want him to get iron sucrose / Venofer 300mg each day), and see if you can get him scheduled for an additional three or four sessions between May 6 and May 16.    -prior to every other infusion (ie, like on the 4th, and then prior to the second infusion after that), get labs including Hgb, iron level, iron binding capacity, and reticulocyte count  -I am hoping to see a significant increase in reticulocyte count if he's going to have any meaningful Hgb response to the transfusions.  It is possible that we will be able to get his Hgb count up to 12 and change, but the hematologist thinks it unlikely that we will be able to get him significantly above 13 just because of his renal failure, no matter how many iron infusions we do.  -Dr. Huerta / Zoila - I would recommend trying to schedule Rob for surgery early in the week of May 19th, if possible, or at the end of the week of May 12th.      -Gold          Previous Messages       ----- Message -----  From: Jolynn Walter RN  Sent: 4/29/2025   4:56 PM CDT  To: Zoila Aggarwal RN; MARY ELLEN Miranda CNP; *  Subject: RE: Anemia, upcoming CABG and Jewhova's Witn*    Hi Dr. Rizvi,    I was able to get Rob scheduled for 3 iron infusions -    Friday, 5/2 at the [facility]  Sunday, 5/4 at the [facility]  Tuesday, 5/6 at [place]    I spoke with the lead infusion nurse at the [facility] and expressed urgency; she explained all infusion locations are booked out pretty far and this would most likely be the best option.  She also said a stat order wouldn't help.    When would you like the labs redrawn and are you ok with this plan?     "    Thanks,    BIJU Neil  PAC Clinic  ----- Message -----  From: Mendel Rizvi MD  Sent: 4/29/2025   4:24 PM CDT  To: Jolynn Walter RN; Zoila Aggarwal RN; *  Subject: RE: Anemia, upcoming CABG and Jewhova's Witn*    No, he needs at least two infusions prior to surgery.  I want him to get one as soon as possible (it might be too late today, now, so ideally tomorrow) and then another one a week later.  He also needs labs redrawn at the time of the second infusion to make sure he is actually having an appropriate response.      Unfortunately, because he had wanted to go to [facility], he didn't do any of the follow up we wanted him to, and now he's way behind.  Ideally he would have gotten like 4 iron infusions to get his Hgb up >13 prior to surgery.  -Gold  [phone]  ----- Message -----  From: Jolynn Walter RN  Sent: 4/29/2025   3:28 PM CDT  To: Zoila Aggarwal RN; MARY ELLEN Miranda CNP; *  Subject: RE: Anemia, upcoming CABG and Jewhova's Witn*    Hansel Cummings,    Yes, we will take care of the pre-op instructions.  Also, since he's getting labs done today I cancelled the lab appointment for 5/1.      Lastly, he was scheduled for an iron infusion at [place] on 5/6.  So he's all set!    [facility]ynn [facility]  ----- Message -----  From: Zoila Aggarwal RN  Sent: 4/29/2025   2:47 PM CDT  To: Jolynn Walter RN; MARY ELLEN Miranda CNP; *  Subject: RE: Anemia, upcoming CABG and Jewhova's Witn*    Thank you so much for your help Rebeca Neil -  Just mallory Neil he has labs scheduled with [facility] on 5/1 for his pre operatives work up. Are you also able to send out a patient instruction for \"Preparing for Your Surgery\", please let me know if you are able to or not. Thank you  "

## 2025-04-30 NOTE — LETTER
[facility]     CARDIOTHORACIC SURGERY PRE-OP INSTRUCTIONS  Your Heart Surgery is scheduled with DR WELLER  On 5/22 at 7:30 am.  Please arrive at 5 am.     Report to the information desk in the front lobby of the hospital at [address] [place] [address], [place] [phone]. When you walk in the main entrance of the hospital it is directly in front of you. Ask for an escort or the  can help direct you. You will then be escorted or directed to  on the third floor for your surgery preparation.      Effective 5/30/23 [facility] is implementing the following visitor policy      Inpatients are allowed unlimited visitors for the duration of their stay.      Visiting hours are 8 am to 8:30 pm.      parking is available for anyone with mobility limitations or disabilities.  ([facility]  24 hours/ 7 days a week; [facility]  7 am- 3:30 pm, Mon- Fri)     COVID 19 TESTING     We are no longer COVID testing patients prior to surgery. If you develop any of the following symptoms; fever, cough, shortness of breath, sore throat, runny or stuffy nose, muscle or body aches, headaches, fatigue, vomiting or diarrhea please call your surgery coordinator BIJU Cummings or the after hours number ([phone]).      INSTRUCTIONS PRIOR TO SURGERY     Please review this letter and the enclosed booklet and other information in this surgery folder carefully and call Zoila Aggarwal RN at [phone] with any questions or concerns. If you were seen in person at clinic you may have been given this then.      MEDICATIONS     ASPIRIN is okay to take up to the day before your surgery but NOT the day of your surgery. Take your other medications as you normally would until the day of surgery unless instructed otherwise. If you do not take aspirin do not start aspirin unless instructed otherwise.       **Take your last dose of Aspirin on  5/15**     IF you are taking a blood thinner, (Coumadin, Warfarin, Plavix, Pradaxa,  Effient, Brilinta, Pletal, Eliquis, Xarelto or other antiplatelet),  please inform your surgery team as soon as possible as  these medications may need to be stopped for several days (3-7) prior your surgery.        If you're taking a SGLT2-I, (Invokana, Farxiga, Jardiance, or Steglatro), please inform your surgery team as soon as possible as these medications need to be stopped for 3-4 days prior to surgery.         Other medications you will need to hold prior to surgery     Take your last dose of Lisinopril on 5/19        STOP ALL ANTIINFLAMMATORY MEDICATIONS: (Ibuprofen, Aleve, Advil, Celebrex, Votaren, Ketoprofen, and Naproxen) 7 days prior to your surgery     STOP ALL SUPPLEMENTS  7 days prior to your surgery. This includes stopping Co-Q 10, vitamin E and all fish oil supplements.   Please check the labels on any OTC eye vitamins you may be taking.  They often contain vitamin E and should be stopped 10 days prior to surgery.      MEDICATIONS THE MORNING OF SURGERY     Please follow the anesthesia teams instructions on what to take the morning of your surgery.     HISTORY AND PHYSICAL:  LABS and IMAGING  All tests and procedures must be within 30 days of your surgery date.     You do NOT need to fast for the blood work.      You have a pre-op clinic visit beginning on 5/1 at 12:45pm in the [facility], [address] [place] [address]. A  or Coordinator will assist you. The Pre Assessment/Anesthesia Clinic is on the fifth floor.  The laboratory and radiology is on the first floor.       Your schedule is as follows:  12 pm VIRTUAL Pre Assessment/Anesthesia (History & physical)  1:10 pm EKG  1:30 pm Chest xray  2 pm Vein mapping   3 pm  Ultrasound of the Carotid     DIABETIC  INSTRUCTIONS     If you have received instructions from your primary care or during the pre anesthesia consult visit please follow those.  Otherwise,  No oral diabetic medication the morning of  surgery.  If you take long acting insulin in the evening, only take half of your dose. We will check your glucose upon arrival.     DO NOT EAT ANY SOLID FOODS AFTER MIDNIGHT or the morning of your surgery. NO MILK, MILK PRODUCTS, SMOOTHIES 8 HOURS PRIOR TO YOUR ARRIVAL TIME.      DO NOT EAT ANYTHING, however you may have any clear liquids; water, black coffee (sugar okay), clear tea, sprite, ginger ale, apple juice, Gatorade or any clear liquid up to two hours before your arrival time. (No enid tea) No milk, or milk products, no smoothies or juice with pulp.      No alcohol for 24 hours prior to surgery.          BELONGINGS  Do not bring personal belongings, jewelry, money, valuables, toiletries or medications to the hospital the morning of your surgery. You may pack a bag and give it to a family member or friend to bring the following day or when needed.   Please remove all jewelry, including body piercings. Cautery instruments are used during surgery that may pose a risk of burns if a body piercing is left in during surgery.      Do bring a photo ID and insurance card.  A copy of your health care directives, if you have one.  Glasses and hearing aids (bring cases).  You cannot wear contact lenses during the surgery.  Inhaler(s) and eye drops if you use them, tell the staff about them when you arrive. Bring your CPAP machine or breathing device, if you use them.  If you have a pacemaker or ICD (cardiac defibrillator) bring the ID card.  If you have an implanted stimulator, bring the remote control.  If you are a legal guardian bring a copy of the certified (court-stamped) guardianship papers.      Call Sonia our  with questions regarding your test and clinic visit dates/times. She can be reached by phone at [phone] between 8 AM and 4:30PM Monday through Friday.     If you have questions about your medications, test results or have a change in your health status call Zoila Aggarwal RN at [phone] during  regular business hours.      On weekends or after 4:30 please call [phone] and ask the  to page the Cardiothoracic Fellow, Nurse Practitioner, Physician Assistant or Staff on call that weekend.      PLEASE NOTE, there are times when elective surgery (like yours) needs to be rescheduled due to an unplanned emergency or heart transplant. If this should happen, your surgery will be rescheduled as quickly as possible. Our surgery team appreciates your understanding during these instances.       Please feel free to call me or our office with any questions.     Thank you,     Zoila Aggarwal RN   Cardiothoracic Surgery  [phone] Direct Phone  [phone]  Fax

## 2025-04-30 NOTE — PROGRESS NOTES
Called patient in regards to change in surgical date to 5/22. Education about safety of patient anemia and patient would need iron infusions. All questions answered in regards to surgery.

## 2025-05-01 ENCOUNTER — ANESTHESIA EVENT (OUTPATIENT)
Dept: SURGERY | Facility: CLINIC | Age: 75
End: 2025-05-01
Payer: COMMERCIAL

## 2025-05-01 ENCOUNTER — PRE VISIT (OUTPATIENT)
Dept: SURGERY | Facility: CLINIC | Age: 75
End: 2025-05-01

## 2025-05-01 ENCOUNTER — PATIENT OUTREACH (OUTPATIENT)
Dept: ONCOLOGY | Facility: CLINIC | Age: 75
End: 2025-05-01

## 2025-05-01 ENCOUNTER — ANCILLARY PROCEDURE (OUTPATIENT)
Dept: ULTRASOUND IMAGING | Facility: CLINIC | Age: 75
End: 2025-05-01
Attending: SURGERY
Payer: COMMERCIAL

## 2025-05-01 ENCOUNTER — VIRTUAL VISIT (OUTPATIENT)
Dept: SURGERY | Facility: CLINIC | Age: 75
End: 2025-05-01
Payer: COMMERCIAL

## 2025-05-01 VITALS — BODY MASS INDEX: 24.05 KG/M2 | HEIGHT: 70 IN | WEIGHT: 168 LBS

## 2025-05-01 DIAGNOSIS — Z99.2 ANEMIA DUE TO CHRONIC KIDNEY DISEASE, ON CHRONIC DIALYSIS (H): ICD-10-CM

## 2025-05-01 DIAGNOSIS — R09.89 OTHER SPECIFIED SYMPTOMS AND SIGNS INVOLVING THE CIRCULATORY AND RESPIRATORY SYSTEMS: ICD-10-CM

## 2025-05-01 DIAGNOSIS — D63.1 ANEMIA DUE TO CHRONIC KIDNEY DISEASE, ON CHRONIC DIALYSIS (H): ICD-10-CM

## 2025-05-01 DIAGNOSIS — I25.10 CORONARY ARTERY DISEASE INVOLVING NATIVE CORONARY ARTERY OF NATIVE HEART WITHOUT ANGINA PECTORIS: ICD-10-CM

## 2025-05-01 DIAGNOSIS — N18.6 ANEMIA DUE TO CHRONIC KIDNEY DISEASE, ON CHRONIC DIALYSIS (H): ICD-10-CM

## 2025-05-01 DIAGNOSIS — Z01.818 PREOP EXAMINATION: Primary | ICD-10-CM

## 2025-05-01 LAB
ATRIAL RATE - MUSE: 63 BPM
DIASTOLIC BLOOD PRESSURE - MUSE: NORMAL MMHG
INTERPRETATION ECG - MUSE: NORMAL
P AXIS - MUSE: 48 DEGREES
PR INTERVAL - MUSE: 204 MS
QRS DURATION - MUSE: 98 MS
QT - MUSE: 456 MS
QTC - MUSE: 466 MS
R AXIS - MUSE: 19 DEGREES
SYSTOLIC BLOOD PRESSURE - MUSE: NORMAL MMHG
T AXIS - MUSE: 10 DEGREES
VENTRICULAR RATE- MUSE: 63 BPM

## 2025-05-01 PROCEDURE — 93880 EXTRACRANIAL BILAT STUDY: CPT | Performed by: RADIOLOGY

## 2025-05-01 RX ORDER — LISINOPRIL 10 MG/1
10 TABLET ORAL AT BEDTIME
COMMUNITY

## 2025-05-01 ASSESSMENT — ENCOUNTER SYMPTOMS
SEIZURES: 0
DYSRHYTHMIAS: 1

## 2025-05-01 ASSESSMENT — LIFESTYLE VARIABLES: TOBACCO_USE: 0

## 2025-05-01 NOTE — PROVIDER NOTIFICATION
05/01/25 1236   Discharge Planning   Patient/Family Anticipates Transition to home with family   Concerns to be Addressed no discharge needs identified   Living Arrangements   People in Home spouse   Type of Residence Private Residence   Is your private residence a single family home or apartment? Single family home   Number of Stairs, Within Home, Primary two   Once home, are you able to live on one level? Yes   Bathroom Shower/Tub Walk-in shower   Equipment Currently Used at Home none   Support System   Clinic recommendation is to have someone available to stay with you for two weeks once home to support you, meal preparation, household tasks, etc. Do you have this support?  Yes   Name Of Support Person? wife, Rafaela

## 2025-05-01 NOTE — PROGRESS NOTES
Rob is a 74 year old who is being evaluated via a billable video visit.      How would you like to obtain your AVS? MyChart        Subjective   Rob is a 74 year old, presenting for the following health issues:  Pre-Op Exam           DANNIE Cristina LPN

## 2025-05-01 NOTE — PROGRESS NOTES
"New Patient Oncology Nurse Navigator Note     Referral Received: 05/01/25      Referring provider:     Emily Walker APRN CNS     Referring Clinic/Organization: [facility]     Referred to: Benign Hematology    Requested provider (if applicable): First available - did not specify      Evaluation for :   N18.6, D63.1, Z99.2 (ICD-10-CM) - Anemia due to chronic kidney disease, on chronic dialysis (H)     \"Patient with CAD, with plan for CAB 5/22/25, Judaism, ISABEL vasculitis, ESRD on PD, plan for iron infusions but assistance in managing anemia prior to surgery, wanting Hgb at 13, Heme consult requested by Anesthesia, Dr. Rizvi.\"     Clinical History (per Nurse review of records provided):       Latest Reference Range & Units 07/29/24 14:01 03/20/25 10:53 04/29/25 15:30   Ferritin 31 - 409 ng/mL   1,174 (H)   Iron 61 - 157 ug/dL   112   Iron Binding Capacity 240 - 430 ug/dL   216 (L)   Iron Sat Index 15 - 46 %   52 (H)   WBC 4.0 - 11.0 10e3/uL 6.0 6.9 5.4   Hemoglobin 13.3 - 17.7 g/dL 10.0 (L) 11.9 (L) 10.9 (L)   Hematocrit 40.0 - 53.0 % 31.0 (L) 36.7 (L) 32.9 (L)   Platelet Count 150 - 450 10e3/uL 289 241 273   RBC Count 4.40 - 5.90 10e6/uL 3.13 (L) 3.76 (L) 3.49 (L)   MCV 78 - 100 fL 99 98 94   MCH 26.5 - 33.0 pg 31.9 31.6 31.2   MCHC 31.5 - 36.5 g/dL 32.3 32.4 33.1   RDW 10.0 - 15.0 % 15.1 (H) 15.3 (H) 14.2   (H): Data is abnormally high  (L): Data is abnormally low    5/1 OV Aaron:   Patient with findings of severe coronary artery disease on coronary angiogram performed during evaluation for renal transplant.  He was referred to Dr. Huerta and counseled for above procedures.     His medical history is otherwise complex with hyperlipidemia, hypertension, CAD with inferior STEMI and VF cardiac arrest 9/6/23 s/p shockwave lithotripsy and SURESH to RCA and SURESH x1 to RPLA, ischemic cardiomyopathy with LVEF of 45%, on amiodarone for VT, s/p ICD, pulmonary nodules, end-stage renal disease, on peritoneal " dialysis, anemia of chronic disease, hypothyroidism, ANCA-associated vasculitis, bladder cancer,  and h/o b/l ANALISA.      The patient was initially seen in the [facility] for anesthesia consult, in consideration for above surgery with known anemia and is Congregational.  Iron studies were completed and iron infusions have been arranged prior to surgery.    Records Location: [facility]     Additional testing needed prior to consult:     ?    Referral updates and Plan:     05/01/2025 10:33 AM -  Referral received and reviewed. Pt is already scheduled for iron infusions.  Message sent to Dr. Cogan to review urgently.     05/01/2025 12:38 PM -  Case discussed with Dr. Cogan.  We will not be able to accommodate the urgent request for scheduling at this time as there are no slots available.  Message sent to referring advising of this information along with the knowledge that it isn't realistic to get his hemoglobin up to 13 by the surgery date.    Advised I would send for next available with options of waitlist.     Scarlett Ward, MONIKN, RN  Hematology/Oncology Nurse Navigator  [facility]  [phone] / [phone]

## 2025-05-01 NOTE — PATIENT INSTRUCTIONS
Preparing for Your Surgery      Name:  Cesar Rashid   MRN:  [id number]   :  1950   Today's Date:  2025     The [place] [employer] [address] Construction Project                                Timeline 2025 -2025    This project will affect travel to the [facility] and [facility], as well as the [facility].      Please check the [facility] [address] project website for the most up to date information and give yourself additional time to reach your destination.        Arriving for surgery:  Surgery date:  25  Arrival time:  5:00 am  Surgery time: 7:30 am    Please come to:     Please come to:       [facility]    [address] [place]  [address]     The [facility] [place] Patient/Visitor Ramp is at [address]. Patients and visitors who self-park will receive the reduced hospital parking rate. If the Patient /Visitor Ramp is full, please follow the signs to the [employer] car park located at the [facility] entrance.      [employer] parking is available (24 hours/ 7 days a week)      Discounted parking pass options are available for patients and visitors. They can be purchased at the [employer] desk at the [facility] entrance.     -    Stop at the security desk and they will direct surgery patients to the Surgery Check in and Family Lounge. [phone]        - If you need directions, a wheelchair or an escort please stop at the Information/security desk in the lobby.     What can I eat or drink?  -  You may eat and drink normally up to 8 hours prior to arrival time. (Until 9:00 pm on 25)  -  You may have clear liquids until 2 hours prior to arrival time. (Until 3:00 am on 25)    Examples of clear liquids:  Water  Clear broth  Juices (apple, white grape, white cranberry  and cider) without  pulp  Noncarbonated, powder based beverages  (lemonade and Cory-Aid)  Sodas (Sprite, 7-Up, ginger ale and seltzer)  Coffee or tea (without milk or cream)  Gatorade    -  No Alcohol or cannabis products for at least 24 hours before surgery.     Which medicines can I take?    Hold Multivitamins for 10 days before surgery.  Hold Supplements for 10 days before surgery.  Hold Ibuprofen (Advil, Motrin) for 7 day(s) before surgery--unless otherwise directed by surgeon.  Hold Naproxen (Aleve) for 7 days before surgery.  Hold Naltrexone for 3 days before surgery. Last dose to be 5/18/25.  Aspirin - follow your surgeon's instructions on when to stop this (5 days before surgery).  Hold Lisinopril for 48 hours before surgery. Last dose to be 5/19/25.    -  DO NOT take these medications the day of surgery:  Sevelamer (Renagel)    -  PLEASE TAKE these medications the day of surgery or per your usual routine:  Carvedilol (Coreg)  Levothyroxine  Rosuvastatin (Crestor) the night before surgery    How do I prepare myself?  - Please take 2 showers (one the night prior to surgery and one the morning of surgery) using Scrubcare or Hibiclens soap.    Use this soap only from the neck to your toes. Avoid genital area      Leave the soap on your skin for one minute--then rinse thoroughly.      You may use your own shampoo and conditioner. No other hair products.   - Please remove all jewelry and body piercings.  - No lotions, deodorants or fragrance.  - No makeup or fingernail polish.   - Bring your ID and insurance card.    -For patients being admitted to the [facility]  Family members are to take the patient belongings with them and place them in the lockers provided in the Family Lounge.  Please limit the items you bring to 1 bag as the lockers are small.      -If you use a CPAP machine, please bring the CPAP machine, tubing, and mask to hospital.    -If you have a Deep Brain Stimulator, Spinal Cord Stimulator, or any Neuro  Stimulator device---you must bring the remote control to the hospital.      ALL PATIENTS GOING HOME THE SAME DAY OF SURGERY ARE REQUIRED TO HAVE A RESPONSIBLE ADULT TO DRIVE AND BE IN ATTENDANCE WITH THEM FOR 24 HOURS FOLLOWING SURGERY.    Covid testing policy as of 12/06/2022  Your surgeon will notify and schedule you for a COVID test if one is needed before surgery--please direct any questions or COVID symptoms to your surgeon      Questions or Concerns:    - For any questions regarding the day of surgery or your hospital stay, please contact the Pre Admission Nursing Office at [phone].       - If you have health changes between today and your surgery, please call your surgeon.       - For questions after surgery, please call your surgeons office.           Current Visitor Guidelines    2 adult visitors for adult patients in the pre op area    If additional visitors come (beyond a patient care attendant or a group home caregiver), the additional visitors will be asked to wait in the main lobby of the hospital    Visiting hours: 8 a.m. to 8:30 p.m.    Patients confirmed or suspected to have symptoms of COVID 19 or flu:     No visitors allowed for adult patients.   Children (under age 18) can have 1 named visitor.     People who are sick or showing symptoms of COVID 19 or flu:    Are not allowed to visit patients--we can only make exceptions in special situations.       Please follow these guidelines for your visit:          Please maintain social distance          Masking is optional--however at times you may be asked to wear a mask for the safety of yourself and others     Clean your hands with alcohol hand . Do this when you arrive at and leave the building and patient room,    And again after you touch your mask or anything in the room.     Go directly to and from the room you are visiting.     Stay in the patient s room during your visit. Limit going to other places in the hospital as much as  possible     Leave bags and jackets at home or in the car.     For everyone s health, please don t come and go during your visit. That includes for smoking   during your visit.

## 2025-05-01 NOTE — H&P
Pre-Operative H & P     CC:  Preoperative exam to assess for increased cardiopulmonary risk while undergoing surgery and anesthesia.    Date of Encounter: 5/1/2025  Primary Care Physician:  Steffi Hatch     Reason for visit:   Encounter Diagnoses   Name Primary?    Preop examination Yes    Anemia due to chronic kidney disease, on chronic dialysis (H)     Coronary artery disease involving native coronary artery of native heart without angina pectoris        HPI  Cesar Rashid is a 74 year old male who presents for pre-operative H & P in preparation for  Procedure Information       Case: [id number] Date/Time: 05/22/25 0730    Procedure: CORONARY ARTERY BYPASS GRAFT AND ANY ASSOCIATED PROCEDURES (Chest)    Anesthesia type: General    Diagnosis: CAD (coronary artery disease) [I25.10]    Pre-op diagnosis: CAD (coronary artery disease) [I25.10]    Location:  OR [address] [place] [address] OR    Providers: Ridge Huerta MD          History is obtained from the patient, wife, and chart review    Patient with findings of severe coronary artery disease on coronary angiogram performed during evaluation for renal transplant.  He was referred to Dr. Huerta and counseled for above procedures.    His medical history is otherwise complex with hyperlipidemia, hypertension, CAD with inferior STEMI and VF cardiac arrest 9/6/23, s/p shockwave lithotripsy and SURESH to RCA and SURESH x1 to RPLA, ischemic cardiomyopathy with LVEF of 45%, on amiodarone for VT, s/p ICD, pulmonary nodules, ANCA associated vasculitis, end-stage renal disease, on peritoneal dialysis, anemia of chronic disease, hypothyroidism, bladder cancer, and OA with h/o b/l ANALISA.     The patient was initially seen in the [facility] for anesthesia consult on 4/14/25, in consideration for above surgery with known anemia and is Worship.  Iron studies were completed and iron infusions have been arranged prior to surgery. They will begin tomorrow.    Today patient  presents virtually again to the [facility] Assessment Center for updated preop evaluation.    Today patient denies fever, cough, shortness of breath, chest pain, irregular HR, or DYSPNEA ON EXERTION. He reports fatigue, and has some intermittent abdominal pain with N/V. He attributes this to his renal disease and notes that his providers are aware.      Hx of abnormal bleeding or anti-platelet use: ASA 81 mg daily      Past Medical History  Past Medical History:   Diagnosis Date    Acute ST elevation myocardial infarction (H)     ANCA-associated vasculitis (H)     Bladder cancer (H)     CAD (coronary artery disease)     Dyslipidemia     End stage renal disease (H)     HTN (hypertension)     ELBERT (iron deficiency anemia)     Ischemic cardiomyopathy     Malignant neoplasm of lateral wall of urinary bladder (H)     Pulmonary nodules        Past Surgical History  Past Surgical History:   Procedure Laterality Date    CV CORONARY ANGIOGRAM N/A 3/20/2025    Procedure: Coronary Angiogram;  Surgeon: Pepito Helton MD;  Location:  [facility]    repaired ruptured globe Right     TOTAL HIP ARTHROPLASTY Left 04/29/2014    TOTAL HIP ARTHROPLASTY Right 09/02/2014    VASCULAR SURGERY      chest port, peritoneal dialysis catheter       Prior to Admission Medications  Current Outpatient Medications   Medication Sig Dispense Refill    aspirin (ASA) 81 MG chewable tablet Take 81 mg by mouth at bedtime.      carvedilol (COREG) 6.25 MG tablet Take 6.25 mg by mouth 2 times daily (with meals)      Coenzyme Q10 (COQ10 PO) Take by mouth every morning.      levothyroxine (SYNTHROID/LEVOTHROID) 200 MCG tablet Take 1 tablet by mouth every morning (before breakfast).      lisinopril (ZESTRIL) 10 MG tablet Take 10 mg by mouth at bedtime.      Naltrexone HCl, Pain, 4.5 MG CAPS Take 4.5 mg by mouth at bedtime.      nitroGLYcerin (NITROSTAT) 0.4 MG sublingual tablet Place 0.4 mg under the tongue every 5 minutes as needed.       rosuvastatin (CRESTOR) 10 MG tablet Take 10 mg by mouth at bedtime.      sevelamer HCl (RENAGEL) 800 MG tablet Take 800 mg by mouth 3 times daily (with meals)      UNABLE TO FIND Take 2 capsules by mouth 2 times daily. MEDICATION NAME: arterosil      UNABLE TO FIND Take 3 tablets by mouth 2 times daily. MEDICATION NAME: [employer]ogenics supplement      vitamin E 400 units TABS Take 400 Units by mouth every morning.      UNABLE TO FIND Inject 120 mcg subcutaneously every 30 days. MEDICATION NAME: Micera is given monthly as needed based on patient's Hgb results.Last dose End April 2025         Allergies  Allergies   Allergen Reactions    Amlodipine Palpitations       Social History  Social History     Socioeconomic History    Marital status:      Spouse name: Not on file    Number of children: Not on file    Years of education: Not on file    Highest education level: Not on file   Occupational History    Not on file   Tobacco Use    Smoking status: Never     Passive exposure: Never    Smokeless tobacco: Never   Substance and Sexual Activity    Alcohol use: Not Currently     Comment: Very little - maybe 2 drinks a year    Drug use: Never    Sexual activity: Not on file   Other Topics Concern    Parent/sibling w/ CABG, MI or angioplasty before 65F 55M? No   Social History Narrative    Not on file     Social Drivers of Health     Financial Resource Strain: Not on File (8/2/2024)    Received from [facility]    Financial Resource Strain     Financial Resource Strain: 0   Food Insecurity: Not on File (9/26/2024)    Received from [facility]    Food Insecurity     Food: 0   Transportation Needs: Not on File (8/2/2024)    Received from [facility]    Transportation Needs     Transportation: 0   Physical Activity: Not on File (8/2/2024)    Received from [facility]    Physical Activity     Physical Activity: 0   Stress: Not on File (8/2/2024)    Received from [facility]    Stress     Stress: 0   Social Connections: Not on File (9/16/2024)    Received  from [facility]    Social Connections     Connectedness: 0   Interpersonal Safety: Not on file   Housing Stability: Not on File (2024)    Received from [facility]    Housing Stability     Housin       Family History  Family History   Problem Relation Age of Onset    Colon Cancer Mother 80    Coronary Artery Disease Mother     Diabetes Mother     Coronary Artery Disease Father     Thyroid Disease Father     Alcoholism Brother     Diabetes Brother     Anesthesia Reaction No family hx of        Review of Systems  The complete review of systems is negative other than noted in the HPI or here.   Anesthesia Evaluation   Pt has had prior anesthetic. Type: General and MAC.    No history of anesthetic complications       ROS/MED HX  ENT/Pulmonary: Comment: Pulmonary nodule    (+)     YUSEF risk factors,  hypertension,                              (-) tobacco use and recent URI   Neurologic:    (-) no seizures and no CVA   Cardiovascular: Comment: STEMI with VF arrest (2023) treated with shockwave lithotripsy and SURESH to RCA/RPLA.  During his hospital stay he had an in-hospital VT arrest.  Repeat coronary angiogram was completed showing no evidence of in-stent restenosis    Status post ICD placement    More recent referral to CT surgery for multivessel coronary artery disease    (+) Dyslipidemia hypertension-range: Recent blood pressure at home 124-130/60-70/ -  CAD - past MI - stent-   Taking blood thinners Pt has not received instructions: Instructions Given to patient: Patient reports surgeon asked him to hold ASA for 5 days prior to surgery. CHF etiology: ICM Last EF: 55-60% date: 24          ICD Reason placed:cardiac arrest.  type;[employer] [employer] Settings VVI 40  dysrhythmias, Other,        Previous cardiac testing   Echo: Date: 24 Results:    Stress Test:  Date: Results:    ECG Reviewed:  Date: 3/20/25 Results:   Sinus bradycardia   Inferior infarct (cited on or before 2024)   QTcB >= 480 msec     Cath:   Date: 3/20/25 Results:   (-) HERMOSILLO   METS/Exercise Tolerance: 4 - Raking leaves, gardening Comment: Activity is limited by fatigue.  Denies chest pain, irregular heart rate or dyspnea on exertion   Hematologic: Comments: Zoroastrianism  Anemia and chronic kidney disease, plan for iron infusions and urgent hematology consult    Beta 2 glycoprotein 1 antibody positive  Note from [facility] 1/9/25 for DVT prophylaxis     (+)      anemia,       (-) history of blood clots and history of blood transfusion   Musculoskeletal: Comment: Bilateral ANALISA  (+)  arthritis,             GI/Hepatic: Comment: Intermittent abd pain, N/V, attributed to his kidney disease   (-) GERD   Renal/Genitourinary: Comment: ESRD likely secondary to MPO ANCA vasculitis diagnosed in 2019  Reports he receives Mircera typically monthly based on his hemoglobin, last late April  Undergoing transplant nephrology workup  Treated with naltrexone for vasculitis per patient    (+) renal disease, type: ESRD, Pt requires dialysis, type: Peritoneal dialysis,       (-) History of transplant   Endo:     (+)          thyroid problem, hypothyroidism,        (-) Type II DM   Psychiatric/Substance Use: Comment: Patient is on naltrexone, given by homeopathic provider for inflammation. Will hold for 3 days prior to surgery   (-) psychiatric history   Infectious Disease:    (-) Recent Fever   Malignancy:   (+) Malignancy, History of Other.Other CA Bladder-TURBT and BCG status post Surgery and Chemo.    Other:  - neg other ROS          Virtual visit -  No vitals were obtained    Physical Exam  Constitutional: Awake, alert, no apparent distress, and appears stated age. Accompanied by wife.  HENT: Normocephalic  Respiratory: non labored breathing; no cough   Neurologic: Oriented to name, place and time.   Neuropsychiatric: Calm, cooperative. Normal affect.      Prior Labs/Diagnostic Studies   All labs and imaging personally reviewed   Lab Results   Component Value Date    WBC  5.4 04/29/2025     Lab Results   Component Value Date    RBC 3.49 04/29/2025     Lab Results   Component Value Date    HGB 10.9 04/29/2025     Lab Results   Component Value Date    HCT 32.9 04/29/2025     Lab Results   Component Value Date    MCV 94 04/29/2025     Lab Results   Component Value Date    MCH 31.2 04/29/2025     Lab Results   Component Value Date    MCHC 33.1 04/29/2025     Lab Results   Component Value Date    RDW 14.2 04/29/2025     Lab Results   Component Value Date     04/29/2025     Last Comprehensive Metabolic Panel:  Sodium   Date Value Ref Range Status   04/29/2025 136 135 - 145 mmol/L Final     Potassium   Date Value Ref Range Status   04/29/2025 4.9 3.4 - 5.3 mmol/L Final     Chloride   Date Value Ref Range Status   04/29/2025 93 (L) 98 - 107 mmol/L Final     Carbon Dioxide (CO2)   Date Value Ref Range Status   04/29/2025 32 (H) 22 - 29 mmol/L Final     Anion Gap   Date Value Ref Range Status   04/29/2025 11 7 - 15 mmol/L Final     Glucose   Date Value Ref Range Status   04/29/2025 104 (H) 70 - 99 mg/dL Final     Urea Nitrogen   Date Value Ref Range Status   04/29/2025 59.0 (H) 8.0 - 23.0 mg/dL Final     Creatinine   Date Value Ref Range Status   04/29/2025 12.01 (H) 0.67 - 1.17 mg/dL Final     GFR Estimate   Date Value Ref Range Status   04/29/2025 4 (L) >60 mL/min/1.73m2 Final     Comment:     eGFR calculated using 2021 CKD-EPI equation.     Calcium   Date Value Ref Range Status   04/29/2025 8.3 (L) 8.8 - 10.4 mg/dL Final     Bilirubin Total   Date Value Ref Range Status   04/29/2025 0.2 <=1.2 mg/dL Final     Alkaline Phosphatase   Date Value Ref Range Status   04/29/2025 78 40 - 150 U/L Final     ALT   Date Value Ref Range Status   04/29/2025 20 0 - 70 U/L Final     AST   Date Value Ref Range Status   04/29/2025 26 0 - 45 U/L Final     INR 1.09  Mg 3.5  PTT 27  Prealbumin 29.8    EKG: 3/20/25 Sinus bradycardia   Inferior infarct (cited on or before 29-Jul-2024)   QTcB >= 480 msec      7/29/24   Echocardiogram   Interpretation Summary  Global and regional left ventricular function is normal with an EF of 55-60%.  Global right ventricular function is normal. The right ventricle is normal  size.  No significant valvular abnormalities present.  There is mild dilation at the level of the ascending aorta (4.2 cm, indexed  value 2.21 cm/m2).  IVC diameter <2.1 cm collapsing >50% with sniff suggests a normal RA pressure  of 3 mmHg.  There is no prior study for direct comparison.    3/20/25 Cardiac catheterization     Ost LM to Mid LM lesion is 40% stenosed.    Ost LAD to Prox LAD lesion is 40% stenosed.    Prox LAD to Mid LAD lesion is 70% stenosed.    Mid LAD lesion is 70% stenosed.    1st Diag lesion is 50% stenosed.    Prox Cx lesion is 80% stenosed.    1st Mrg lesion is 40% stenosed.    Mid Cx to Dist Cx lesion is 70% stenosed.    Prox RCA lesion is 70% stenosed.    Prox RCA to Mid RCA lesion is 100% stenosed.     Moderate lesion of the proximal left main coronary artery.  Moderate diffuse disease of the left anterior descending artery. There are severe tandem 70% obstructive lesions distal to the takeoff of the first diagonal.  There is an 80% obstructive lesion of the proximal left circumflex artery as well as a 70% obstructive lesion distal to the takeoff of OM2.  There is a focal 70% obstructive lesion of the proximal right coronary artery. The previously placed drug-eluting stent has a 100% chronic in-stent restenosis. The distal vessel fills via R-R and L-R collaterals.  Successful uncomplicated right radial arterial access with hemostasis by TR band placement.    Cardiac device check 3/28/25   Routine remote transmission for this [employer] [employer] single chamber ICD.   -Programmed: VVI 40   -Presenting rhythm: VS w/PVCs - 84bpm   -Battery Status: 12.3 years   -Lead Status: Stable lead trending, within normal limits.  No concerns.   -Heart rate trending graphs show good rate variation.    -Pacing: <0.1%    -Atrial high rates: 0   -Ventricular high rates: 0   -ICD therapies: 0 since implant   -Optivol:  Stable lung fluid status.   Plan: No device concerns, home remote check every 3 months and return to clinic 10/2025 .   Indication: Cardiac Arrest     CT Chest 3/20/25                                                 IMPRESSION:   1. Severe coronary artery disease.  2. No acute airspace disease.  3. Previously noted sub-6 mm pulmonary nodules are stable when  compared to 9/12/2024 CT.  4. Ectatic ascending aorta measuring up to 4.2 cm.     The patient's records and results personally reviewed by this provider.     Outside records reviewed from: Care Everywhere      Assessment    Cesar Rashid is a 74 year old male seen as a PAC referral for risk assessment and optimization for anesthesia.    Plan/Recommendations  Pt will be optimized for the proposed procedure.  See below for details on the assessment, risk, and preoperative recommendations    NEUROLOGY  - No history of TIA, CVA or seizure    -Post Op delirium risk factors:  High co-morbid index    ENT  - No current airway concerns.  Will need to be reassessed day of surgery.  Mallampati: Unable to assess  TM: Unable to assess    Denies swallowing difficulty  Some anesthesia records available    Low vision in right eye from history of ruptured globe    CARDIAC  - Known multivessel CAD on recent angiogram in setting of chronic anemia in patient with ESRD and is Tenriism.    ~ Anesthesia consultation for optimizing anemia preop on 4/30/25.  ~ CBC with reflex to iron studies >>plan for iron infusions      - STEMI with VF arrest (9/2023) treated with shockwave lithotripsy and SURESH to RCA/RPLA,  ischemic cardiomyopathy with LVEF of 45%. During his hospital stay, he had an in-hospital VT arrest. Repeat coronary angiogram was completed that showed no evidence of In-Stent restenosis. Patient is s/p ICD placement.               ~ denies  "cardiac symptoms, but is fatigued                 -  Ischemic cardiomyopathy with recovery of LVEF 60% (1/2025)  S/p ICD placement and medical management              ~ Will take Coreg on DOS     - Cardiac arrest  S/p ICD placement. Last device check above  Discussed with device staff. They confirmed [place] [employer] single chamber ICD     - METS (Metabolic Equivalents)~4      PULMONARY  Denies asthma, cough or use of inhaler  Pulmonary nodules, followed  YUSEF Medium Risk             Total Score: 3    YUSEF: Hypertension    YUSEF: Over 50 ys old    YUSEF: Male      - Tobacco History    History   Smoking Status    Never   Smokeless Tobacco    Never       GI: Denies GERD.  Reports intermittent abd pain with N/V, this has been chronic and he attributes this to his renal disease  PONV Medium Risk  Total Score: 2           1 AN PONV: Patient is not a current smoker    1 AN PONV: Intended Post Op Opioids        /RENAL  - Baseline Creatinine  Last 12.01  End-stage renal disease likely secondary to MPO ANCA vasculitis, diagnosed 2019.  Peritoneal dialysis nightly  Has been undergoing transplant nephrology workup  Receives Mircera typically monthly based on hemoglobin, last in late April    Noninvasive urothelial carcinoma, status post TURBT and BCG    Patient and wife report that he is taking naltrexone 4.5 mg capsules at bedtime for inflammation provided by homeopathic provider. Patient is comfortable holding this for 3 days prior to procedure.     ENDOCRINE    - BMI: Estimated body mass index is 24.45 kg/m  as calculated from the following:    Height as of this encounter: 1.765 m (5' 9.5\").    Weight as of this encounter: 76.2 kg (168 lb).  Healthy Weight (BMI 18.5-24.9)  - No history of Diabetes Mellitus  A1C 5.2    HEME  VTE Low Risk 0.5%             Total Score: 3    VTE: Greater than 59 yrs old    VTE: Male      Denies history of thrombosis    Beta 2 glycoprotein 1 antibody +  Heme consult Dr. Haque " "1/9/25  Recommendations:  I counseled the patient about my assessment of results of his APS testing as above  This should not be a barrier to transplant.  The presence of cardiolipin antibodies and beta-2 micro protein 1 antibodies in the absence of a clinical thrombotic event or other manifestation of APS is of no clinical significance so he should receive should receive standard of care VTE prophylaxis after kidney transplant.    Anemia in end-stage renal disease  Patient is Yazidism.  He reports that he will not accept blood transfusion but will accept Cell Saver    Due to this he has been asked to hold his aspirin for 5 days prior to surgery by surgery team  Last labs  Ferritin 1,174  Iron  112  Iron binding capacity  216  Iron sat index 52  Retic 0.9  Abs retic 0.033  Vit B 12 1,104    Dr. Rizvi contacted Dr. Haque to discuss patient. In summary from discussion,   \"1.) Patient is unlikely to achieve a Hgb >13 before cardiac surgery   2.)  Rob should continue with the IV iron infusions & lab draws that have already been scheduled.   3.) Dr. Colton Haque will see Rob in clinic on Tuesday May 6th for hematology consultation.   4.) Dr. Huerta should decide whether he feels surgery is still appropriate if it cannot be safely done with a Hgb significantly <13.\"       MSK  Patient is NOT Frail             Total Score: 0      Osteoarthritis status post bilateral total hip replacements    Different anesthesia methods/types have been discussed with the patient, but they are aware that the final plan will be decided by the assigned anesthesia provider on the date of service.  Patient was discussed with Dr Rizvi    The patient is optimized for their procedure. AVS with information on surgery time/arrival time, meds and NPO status given by nursing staff. No further diagnostic testing indicated.    Please refer to the physical examination documented by the anesthesiologist in the anesthesia record on the day of " surgery.    Video-Visit Details    Type of service:  Video Visit    Provider received verbal consent for a Video Visit from the patient? Yes   Video Start Time:  12:00pm   Video End Time: 12:15pm     Originating Location (pt. Location): Parked in their car in [place]    Distant Location (provider location):  Off-site  Mode of Communication:  Video Conference via [employer]  On the day of service:     Prep time: 20 minutes  Visit time: 15 minutes  Documentation time: 15 minutes  ------------------------------------------  Total time: 50 minutes      MARY ELLEN Epstein CNS  Preoperative Assessment Center  [facility]  Clinic and Surgery Center  Phone: [phone]  Fax: [phone]

## 2025-05-04 ENCOUNTER — INFUSION THERAPY VISIT (OUTPATIENT)
Dept: INFUSION THERAPY | Facility: CLINIC | Age: 75
End: 2025-05-04
Attending: NURSE PRACTITIONER
Payer: COMMERCIAL

## 2025-05-04 VITALS
RESPIRATION RATE: 15 BRPM | DIASTOLIC BLOOD PRESSURE: 82 MMHG | TEMPERATURE: 98.1 F | SYSTOLIC BLOOD PRESSURE: 144 MMHG | HEART RATE: 60 BPM | OXYGEN SATURATION: 100 %

## 2025-05-04 DIAGNOSIS — D50.8 IRON DEFICIENCY ANEMIA SECONDARY TO INADEQUATE DIETARY IRON INTAKE: ICD-10-CM

## 2025-05-04 DIAGNOSIS — D50.9 IRON DEFICIENCY ANEMIA, UNSPECIFIED IRON DEFICIENCY ANEMIA TYPE: ICD-10-CM

## 2025-05-04 DIAGNOSIS — N18.6 END STAGE RENAL DISEASE (H): Primary | ICD-10-CM

## 2025-05-04 LAB
HGB BLD-MCNC: 10.6 G/DL (ref 13.3–17.7)
IRON BINDING CAPACITY (ROCHE): 185 UG/DL (ref 240–430)
IRON SATN MFR SERPL: 64 % (ref 15–46)
IRON SERPL-MCNC: 119 UG/DL (ref 61–157)
RETICS # AUTO: 0.03 10E6/UL (ref 0.03–0.1)
RETICS/RBC NFR AUTO: 0.7 % (ref 0.5–2)

## 2025-05-04 PROCEDURE — 258N000003 HC RX IP 258 OP 636: Performed by: NURSE PRACTITIONER

## 2025-05-04 PROCEDURE — 85045 AUTOMATED RETICULOCYTE COUNT: CPT

## 2025-05-04 PROCEDURE — 96365 THER/PROPH/DIAG IV INF INIT: CPT

## 2025-05-04 PROCEDURE — 96366 THER/PROPH/DIAG IV INF ADDON: CPT

## 2025-05-04 PROCEDURE — 36415 COLL VENOUS BLD VENIPUNCTURE: CPT

## 2025-05-04 PROCEDURE — 250N000011 HC RX IP 250 OP 636: Performed by: NURSE PRACTITIONER

## 2025-05-04 PROCEDURE — 83550 IRON BINDING TEST: CPT

## 2025-05-04 PROCEDURE — 85018 HEMOGLOBIN: CPT

## 2025-05-04 RX ORDER — DIPHENHYDRAMINE HYDROCHLORIDE 50 MG/ML
50 INJECTION, SOLUTION INTRAMUSCULAR; INTRAVENOUS
Status: CANCELLED
Start: 2025-05-06

## 2025-05-04 RX ORDER — MEPERIDINE HYDROCHLORIDE 25 MG/ML
25 INJECTION INTRAMUSCULAR; INTRAVENOUS; SUBCUTANEOUS
Status: CANCELLED | OUTPATIENT
Start: 2025-05-06

## 2025-05-04 RX ORDER — EPINEPHRINE 1 MG/ML
0.3 INJECTION, SOLUTION INTRAMUSCULAR; SUBCUTANEOUS EVERY 5 MIN PRN
Status: CANCELLED | OUTPATIENT
Start: 2025-05-06

## 2025-05-04 RX ORDER — METHYLPREDNISOLONE SODIUM SUCCINATE 40 MG/ML
40 INJECTION INTRAMUSCULAR; INTRAVENOUS
Status: CANCELLED
Start: 2025-05-06

## 2025-05-04 RX ORDER — ALBUTEROL SULFATE 0.83 MG/ML
2.5 SOLUTION RESPIRATORY (INHALATION)
Status: CANCELLED | OUTPATIENT
Start: 2025-05-06

## 2025-05-04 RX ORDER — DIPHENHYDRAMINE HYDROCHLORIDE 50 MG/ML
25 INJECTION, SOLUTION INTRAMUSCULAR; INTRAVENOUS
Status: CANCELLED
Start: 2025-05-06

## 2025-05-04 RX ORDER — ALBUTEROL SULFATE 90 UG/1
1-2 INHALANT RESPIRATORY (INHALATION)
Status: CANCELLED
Start: 2025-05-06

## 2025-05-04 RX ORDER — HEPARIN SODIUM (PORCINE) LOCK FLUSH IV SOLN 100 UNIT/ML 100 UNIT/ML
5 SOLUTION INTRAVENOUS
Status: CANCELLED | OUTPATIENT
Start: 2025-05-06

## 2025-05-04 RX ORDER — HEPARIN SODIUM,PORCINE 10 UNIT/ML
5-20 VIAL (ML) INTRAVENOUS DAILY PRN
Status: CANCELLED | OUTPATIENT
Start: 2025-05-06

## 2025-05-04 RX ADMIN — IRON SUCROSE 300 MG: 20 INJECTION, SOLUTION INTRAVENOUS at 11:16

## 2025-05-04 NOTE — PROGRESS NOTES
Nursing Note  Cesar Rashid [facility] for:   Chief Complaint   Patient presents with    Infusion     During today's [facility] appointment, orders from Rebeca Rosas CNP were completed.  Frequency: today is dose 2 of 3 total    Progress note:  Patient identification verified by name and date of birth.  Assessment completed.  Vitals recorded in Doc Flowsheets.  Patient was provided with education regarding medication/procedure and possible side effects.  Patient verbalized understanding.     present during visit today: Not Applicable.    Treatment Conditions: Non-applicable.    Premedications: were not ordered.    Drug Waste Record: No    Infusion length and rate:  infusion given over approximately  90 minutes    Labs: were drawn per orders.     Vascular access: peripheral IV placed today.    Is the next appt scheduled? yes    Post Infusion Assessment:  Patient tolerated infusion without incident.  No evidence of extravasations.  Access discontinued per protocol.     Discharge Plan:   Follow up plan of care with: ongoing infusions at [facility]., ordering provider as scheduled., and after visit summary declined by patient  Discharge instructions were reviewed with patient.  Patient/representative verbalized understanding of discharge instructions and all questions answered.  Patient discharged from [facility] in stable condition.    Debby Paz RN        Administrations This Visit       iron sucrose (VENOFER) 300 mg in sodium chloride 0.9 % 290 mL intermittent infusion       Admin Date  05/04/2025 Action  $New Bag Dose  300 mg Rate  193.3 mL/hr Route  Intravenous Documented By  Debby Paz RN

## 2025-05-04 NOTE — PATIENT INSTRUCTIONS
Dear Cesar Rashid    Thank you for choosing [facility] ([facility] for your infusion.  The following information is a summary of our appointment as well as important reminders.      If you are a transplant patient and require transplant education, please click on this link: [email or web address].    If you have any questions on your upcoming [facility] appointments, please call scheduling at [phone].  It was a pleasure taking care of you today.    Sincerely,    [facility]  [facility]  [address] [place] [address]  [id number]  Phone:  [phone]

## 2025-05-05 ENCOUNTER — PATIENT OUTREACH (OUTPATIENT)
Dept: CARE COORDINATION | Facility: CLINIC | Age: 75
End: 2025-05-05
Payer: COMMERCIAL

## 2025-05-05 DIAGNOSIS — C67.2 MALIGNANT NEOPLASM OF LATERAL WALL OF URINARY BLADDER (H): Primary | ICD-10-CM

## 2025-05-05 NOTE — TELEPHONE ENCOUNTER
RECORDS STATUS - ALL OTHER DIAGNOSIS      RECORDS RECEIVED FROM: [facility] - Internal records   DATE RECEIVED: 5/5

## 2025-05-05 NOTE — PROGRESS NOTES
Social Work - Intervention  [facility]  Data/Intervention:    Patient Name: Cesar Rashid Goes By: Rob FINK/Age: 1950 (74 year old)     Visit Type: telephone and MyChart  Referral Source: self - care team - JOSE GUADALUPE Sands  Reason for Referral: insurance coverage    Collaborated With:    -patient/spouse  -care team     Psychosocial Information/Concerns:  Received message from colleague JOSE GUADALUPE Sands inquiring about messages from patient's care team. There was concern that patient was not covered by his insurance for scheduled visit with Dr Haque.     JOSE GUADALUPE contacted patient and spoke with patient's wife Rafaela, who states they just received a [employer] message indicating patient's insurance would cover this visit.     JOSE GUADALUPE also suggested to follow up with patient's insurance plan to confirm that Dr Haque is in-network for tomorrow's appointment.      Intervention/Education/Resources Provided:  JOSE GUADALUPE recommends for patient to follow up with insurance company  SW offered additional resources and educated on SW role     Assessment/Plan:  JOSE GUADALUPE will follow up with [employer] message with contact information.  JOSE GUADALUPE will remain available.    FAITH Velazco, LGJOSE GUADALUPE  Clinical , Adult Oncology  [facility]   [address] [place] [id number]  [email or web address]  Office Phone: [phone]  Support Groups at [facility]: Social Work Services for Cancer Patients ([email or web address])

## 2025-05-06 ENCOUNTER — INFUSION THERAPY VISIT (OUTPATIENT)
Dept: INFUSION THERAPY | Facility: CLINIC | Age: 75
End: 2025-05-06
Attending: NURSE PRACTITIONER
Payer: COMMERCIAL

## 2025-05-06 ENCOUNTER — ONCOLOGY VISIT (OUTPATIENT)
Dept: ONCOLOGY | Facility: CLINIC | Age: 75
End: 2025-05-06
Attending: CLINICAL NURSE SPECIALIST
Payer: COMMERCIAL

## 2025-05-06 ENCOUNTER — PRE VISIT (OUTPATIENT)
Dept: ONCOLOGY | Facility: CLINIC | Age: 75
End: 2025-05-06
Payer: COMMERCIAL

## 2025-05-06 VITALS
DIASTOLIC BLOOD PRESSURE: 80 MMHG | RESPIRATION RATE: 12 BRPM | WEIGHT: 168.1 LBS | SYSTOLIC BLOOD PRESSURE: 135 MMHG | HEIGHT: 68 IN | TEMPERATURE: 98.3 F | BODY MASS INDEX: 25.48 KG/M2 | OXYGEN SATURATION: 100 % | HEART RATE: 62 BPM

## 2025-05-06 VITALS
OXYGEN SATURATION: 100 % | RESPIRATION RATE: 16 BRPM | HEART RATE: 63 BPM | SYSTOLIC BLOOD PRESSURE: 171 MMHG | TEMPERATURE: 98.2 F | DIASTOLIC BLOOD PRESSURE: 89 MMHG

## 2025-05-06 DIAGNOSIS — D63.1 ANEMIA DUE TO CHRONIC KIDNEY DISEASE, ON CHRONIC DIALYSIS (H): ICD-10-CM

## 2025-05-06 DIAGNOSIS — Z01.818 PREOP EXAMINATION: ICD-10-CM

## 2025-05-06 DIAGNOSIS — D50.9 IRON DEFICIENCY ANEMIA, UNSPECIFIED IRON DEFICIENCY ANEMIA TYPE: ICD-10-CM

## 2025-05-06 DIAGNOSIS — Z99.2 ANEMIA DUE TO CHRONIC KIDNEY DISEASE, ON CHRONIC DIALYSIS (H): ICD-10-CM

## 2025-05-06 DIAGNOSIS — I25.10 CORONARY ARTERY DISEASE INVOLVING NATIVE CORONARY ARTERY OF NATIVE HEART WITHOUT ANGINA PECTORIS: ICD-10-CM

## 2025-05-06 DIAGNOSIS — D50.8 IRON DEFICIENCY ANEMIA SECONDARY TO INADEQUATE DIETARY IRON INTAKE: ICD-10-CM

## 2025-05-06 DIAGNOSIS — N18.6 ANEMIA DUE TO CHRONIC KIDNEY DISEASE, ON CHRONIC DIALYSIS (H): ICD-10-CM

## 2025-05-06 DIAGNOSIS — N18.6 END STAGE RENAL DISEASE (H): Primary | ICD-10-CM

## 2025-05-06 LAB
HGB BLD-MCNC: 10.2 G/DL (ref 13.3–17.7)
IRON BINDING CAPACITY (ROCHE): ABNORMAL
IRON SATN MFR SERPL: ABNORMAL %
IRON SERPL-MCNC: 401 UG/DL (ref 61–157)
RETICS # AUTO: 0.03 10E6/UL (ref 0.03–0.1)
RETICS/RBC NFR AUTO: 0.9 % (ref 0.5–2)

## 2025-05-06 PROCEDURE — 258N000003 HC RX IP 258 OP 636: Performed by: NURSE PRACTITIONER

## 2025-05-06 PROCEDURE — 85018 HEMOGLOBIN: CPT | Performed by: NURSE PRACTITIONER

## 2025-05-06 PROCEDURE — 83540 ASSAY OF IRON: CPT | Performed by: NURSE PRACTITIONER

## 2025-05-06 PROCEDURE — 82465 ASSAY BLD/SERUM CHOLESTEROL: CPT | Performed by: NURSE PRACTITIONER

## 2025-05-06 PROCEDURE — 36415 COLL VENOUS BLD VENIPUNCTURE: CPT

## 2025-05-06 PROCEDURE — G0463 HOSPITAL OUTPT CLINIC VISIT: HCPCS | Performed by: INTERNAL MEDICINE

## 2025-05-06 PROCEDURE — 250N000011 HC RX IP 250 OP 636: Performed by: NURSE PRACTITIONER

## 2025-05-06 PROCEDURE — 85045 AUTOMATED RETICULOCYTE COUNT: CPT | Performed by: NURSE PRACTITIONER

## 2025-05-06 RX ORDER — DIPHENHYDRAMINE HYDROCHLORIDE 50 MG/ML
25 INJECTION, SOLUTION INTRAMUSCULAR; INTRAVENOUS
Start: 2025-05-06

## 2025-05-06 RX ORDER — EPINEPHRINE 1 MG/ML
0.3 INJECTION, SOLUTION, CONCENTRATE INTRAVENOUS EVERY 5 MIN PRN
OUTPATIENT
Start: 2025-05-06

## 2025-05-06 RX ORDER — METHYLPREDNISOLONE SODIUM SUCCINATE 40 MG/ML
40 INJECTION INTRAMUSCULAR; INTRAVENOUS
Start: 2025-05-06

## 2025-05-06 RX ORDER — ALBUTEROL SULFATE 0.83 MG/ML
2.5 SOLUTION RESPIRATORY (INHALATION)
OUTPATIENT
Start: 2025-05-06

## 2025-05-06 RX ORDER — DIPHENHYDRAMINE HYDROCHLORIDE 50 MG/ML
50 INJECTION, SOLUTION INTRAMUSCULAR; INTRAVENOUS
Start: 2025-05-06

## 2025-05-06 RX ORDER — ALBUTEROL SULFATE 90 UG/1
1-2 INHALANT RESPIRATORY (INHALATION)
Start: 2025-05-06

## 2025-05-06 RX ORDER — HEPARIN SODIUM (PORCINE) LOCK FLUSH IV SOLN 100 UNIT/ML 100 UNIT/ML
5 SOLUTION INTRAVENOUS
OUTPATIENT
Start: 2025-05-06

## 2025-05-06 RX ORDER — MULTIVITAMIN
1 TABLET ORAL DAILY
COMMUNITY

## 2025-05-06 RX ORDER — MEPERIDINE HYDROCHLORIDE 25 MG/ML
25 INJECTION INTRAMUSCULAR; INTRAVENOUS; SUBCUTANEOUS
OUTPATIENT
Start: 2025-05-06

## 2025-05-06 RX ORDER — HEPARIN SODIUM,PORCINE 10 UNIT/ML
5-20 VIAL (ML) INTRAVENOUS DAILY PRN
OUTPATIENT
Start: 2025-05-06

## 2025-05-06 RX ADMIN — IRON SUCROSE 300 MG: 20 INJECTION, SOLUTION INTRAVENOUS at 13:51

## 2025-05-06 RX ADMIN — SODIUM CHLORIDE 250 ML: 0.9 INJECTION, SOLUTION INTRAVENOUS at 13:40

## 2025-05-06 ASSESSMENT — PAIN SCALES - GENERAL
PAINLEVEL_OUTOF10: NO PAIN (0)
PAINLEVEL_OUTOF10: NO PAIN (0)

## 2025-05-06 NOTE — PROGRESS NOTES
Chief Complaint   Patient presents with    Blood Draw     Labs drawn from PIV per pt request   There were no vitals taken for this visit.  Napoleon Gerber RN on 5/6/2025 at 4:07 PM

## 2025-05-06 NOTE — PATIENT INSTRUCTIONS
May 2025      [place] Monday Tuesday Wednesday Thursday Friday Saturday                       1    PAC EVAL  11:45 AM   (60 min.)   Emily Walker, APRN CNS   [facility] [place]    ECG   1:10 PM   (20 min.)    EKG LAB   [facility]    XR GENERAL XRAY   1:15 PM   (20 min.)   [id number]   [facility] Imaging [place] X[facility] [place]    US LWR EXT VENOUS MAP BILAT   1:45 PM   (60 min.)   [email or web address]   [facility]    US CAROTID BILATERAL   2:45 PM   (50 min.)   [email or web address]   [facility] 2    SPEC INFUSION 2 HR (120 MIN)   1:00 PM   (120 min.)   UC SIPC INFUSION NURSE   [facility] Treatment [facility] 3       4    SPEC INFUSION 2 HR (120 MIN)  11:00 AM   (120 min.)   UC SIPC INFUSION NURSE   [facility] 5     6    NEW NON-MALIGNANT ONC  10:15 AM   (60 min.)   Colton Haque MD   [facility]    INFUSION 1.5 HR (90 MIN)   1:30 PM   (90 min.)   [place] CANCER INFUSION NURSE   [facility] Cancer [facility]    LAB   4:20 PM   (10 min.)   [place] LAB [facility] 7    NEW CARDIOLOGY   8:15 AM   (30 min.)   Letty Chung MD   [facility] [place] 8     9     10       11     12     13     14     15     16     17       18     19     20     21     22    CORONARY ARTERY BYPASS GRAFT   7:30 AM   Ridge Huerta MD   [facility] OR 23     24       25     26     27     28     29     30     31                 June 2025 Sunday Monday Tuesday Wednesday Thursday Friday Saturday   1     2     3     4     5     6     7       8     9     10     11     12     13     14       15     16     17     18     19     20     21       22     23     24     25     26     27     28       29     30                                              Lab Results:  Recent Results (from  the past 12 hours)   External Culture Results    Collection Time: 01/23/29 11:24 AM   Result Value Ref Range    Scan Lab Results (External) See Scanned Report

## 2025-05-06 NOTE — NURSING NOTE
"Oncology Rooming Note    May 6, 2025 10:21 AM   Cesar Rashid is a 74 year old male who presents for:    Chief Complaint   Patient presents with    Oncology Clinic Visit     New eval Preop examination due to chronic kidney disease, on chronic dialysis      Initial Vitals: There were no vitals taken for this visit. Estimated body mass index is 24.45 kg/m  as calculated from the following:    Height as of 5/1/25: 1.765 m (5' 9.5\").    Weight as of 5/1/25: 76.2 kg (168 lb). There is no height or weight on file to calculate BSA.  Data Unavailable Comment: Data Unavailable   No LMP for male patient.  Allergies reviewed: Yes  Medications reviewed: Yes    Medications: Medication refills not needed today.  Pharmacy name entered into [employer]: [facility] [place] [id number] [employer]    Frailty Screening:   Is the patient here for a new oncology consult visit in cancer care? 1. Yes. Over the past month, have you experienced difficulty or required a caregiver to assist with:   1. Balance, walking or general mobility (including any falls)? NO  2. Completion of self-care tasks such as bathing, dressing, toileting, grooming/hygiene?  NO  3. Concentration or memory that affects your daily life?  NO     PHQ9:  Did this patient require a PHQ9?: No      Clinical concerns: None      Yahir Johnson             "

## 2025-05-06 NOTE — PROGRESS NOTES
[facility] Hematology Consultation  [address] [place] [address]  Phone: [phone]    Outpatient Visit Note:    Patient: Cesar Rashid  MRN: [id number]  : 1950  ROXANNE: May 6, 2025    Reason for Consultation:  Cesar Rashid is a referred by anesthesia for evaluation and treatment of anemia in the setting of upcoming cardiac surgery and the patient being a Protestant.     Assessment: Cesar Rashid is a 74 year old man with ESRD on peritoneal dialysis, CAD, chronic anemia, who is due for cardiac surgery (CABG) on May 22, 2025, but also refuses blood products. Given he is iron and vitamin replete, our only option outside of transfusion is EPO supplementation, so after discussion with Dr Valdivia (nephrology) we will double his dose of EPO the week before surgery.    Following surgery, he may also require daily EPO to keep Hgb at a goal of 10 or higher.    Recommendations:  Discussed with the patient about optimization of Hb prior to surgery with EPO.   Discussed the case with patient's nephrologist Dr. Loreta Valdivia who will double the dose as compared to prior EPO doses to aim for Hb in the range of 11-12 or higher.   Recommend initiation of EPO at 10,000 daily post operative till Hb > 10.   Patient understood the management decisions.     Case was discussed with Dr. Haque who was in agreement with the plan.     Oumar Santiago MD  Hematology/Oncology/BMT Fellow PGY4  Pager: [phone]    Physician Attestation   I saw this patient with the resident and agree with the resident s findings and plan of care as documented in the resident s note.      Briefly, Rob is a 74 year old man with ESRD on PD, CAD now requiring CAGB, but hgb is not optimal at about 10 at most recent measure.  He is iron replete and has no vitamin deficiencies so his anemia is primarily due to ESRD.  In order to optimize his Hb for surgery, I discussed a plan with Dr Huerta (CT surgery),  Dr Rizvi (anesthesia), and Dr Valdivia (nephrology) to increase his EPO dose the week before surgery and then daily EPO post op to increase Hgb to 10.  The patient and his wife understand and agree with the plan.    60 minutes spent by me on the date of the encounter doing chart review, review of outside records, review of test results, interpretation of tests, patient visit, documentation, and discussion with other provider(s)     Colton Haque MD  Associate Professor of Medicine, Division of Hematology, Oncology and Transplantation  [employer] [facility] [employer] [facility] [employer]     -------------------------------    History: Cesar Rashid is a 74 year old with PMHx as outlined below.     Medical history is notable for hyperlipidemia, hypertension, CAD with inferior STEMI and VF cardiac arrest 9/6/23, s/p shockwave lithotripsy and SURESH to RCA and SURESH x1 to RPLA, ischemic cardiomyopathy with LVEF of 45%, on amiodarone for VT, s/p ICD, pulmonary nodules, ANCA associated vasculitis, end-stage renal disease, on peritoneal dialysis, anemia of chronic disease, hypothyroidism, bladder cancer, and OA with h/o b/l ANALISA.     Surgical history is reviewed in the EMR     Medications are reviewed in the EMR     Family history is noncontributory    Patient endorsed that he is having cardiac surgery on May 22. He does not have any current symptoms at this point in time. Denied chest pain at rest, nausea, vomiting, abdominal pain, headache, changes in bowel or bladder habits, fevers, weightloss, night sweats.     Physical Exam:  Vitals: B/P: 135/80, T: 98.3, P: 62, R: 12, Wt: 168 lbs 1.6 oz Body mass index is 25.33 kg/m .   Exam:   Gen: Appears well, no distress  HEENT: no scleral icterus or hemorrhage, no wet purpura, no lymphadenopathy  CV: regular, no murmurs  Pulm: clear  Abd: soft, nontender, no splenomegaly  Ext: no edema  Skin: no ecchymoses or hematomas  Neuro: no focal deficits, affect and cognition are normal    Labs:    Latest Reference Range & Units 07/29/24 14:01 03/20/25 10:53 04/29/25 15:30 05/04/25 10:55   WBC 4.0 - 11.0 10e3/uL 6.0 6.9 5.4    Hemoglobin 13.3 - 17.7 g/dL 10.0 (L) 11.9 (L) 10.9 (L) 10.6 (L)   Hematocrit 40.0 - 53.0 % 31.0 (L) 36.7 (L) 32.9 (L)    Platelet Count 150 - 450 10e3/uL 289 241 273    (L): Data is abnormally low     Latest Reference Range & Units 04/29/25 15:30 05/04/25 10:55   % Retic 0.5 - 2.0 % 0.9 0.7   Absolute Retic 0.025 - 0.095 10e6/uL 0.033 0.025      Latest Reference Range & Units 04/29/25 15:30   Ferritin 31 - 409 ng/mL 1,174 (H)   (H): Data is abnormally high      Imaging: Reviewed

## 2025-05-06 NOTE — PROGRESS NOTES
General Cardiology Clinic[facility]      HPI: Mr. Cesar Rashid is a 74 year old  male with PMH significant for    -HTN, well controlled  -CAD with inferior STEMI in September 2023 and VF cardiac arrest 9/6/23 s/p shockwave lithotripsy and SURESH to RCA and SURESH x1 to RPLA, ischemic cardiomyopathy with LVEF of 45% now recovered to 60%  -VT, used to be on amiodarone when he had MI; No longer on amiodarone  -Multivessel obstructive CAD  -ESRD on peritoneal dialysis being planned for kidney transplant  -ANCA vasculitis  -Chronic anemia, Muslim, not accepting blood tranfusions    He is planned for CABG with Dr. Huerta  on May 22, 2025 and is setting up Cardiology care. He underwent an angiogram as work-up pre-kidney transplant and it showed multivessel obstructive CAD including moderate LM disease, so decision to surgically revascularise was made.   Patient was admitted to [facility] in September 2023 after inferior STEMI, c/b VF arrest,underwent shockwave lithotripsy and SURESH to RCA and SURESH x1 to RPLA, also had ICD placed during that admission. EF was 45% then, it has now recovered to 60%.  He reports doing well since that admission from Cardiology standpoint. No episodes of chest pain. Exercise tolerance is good,he can walk for as long as he likes, can easily go up a flight of stairs. No episodes of VT on most recent device check. BP at home usually 110-120s SBP. He denies SOB, light-headedness, papitations, edema.     Lifelong non-smoker.  No alcohol abuse.    Medications, personal, family, and social history reviewed with patient and revised.    PAST MEDICAL HISTORY:  Past Medical History:   Diagnosis Date    Acute ST elevation myocardial infarction (H)     ANCA-associated vasculitis (H)     Bladder cancer (H)     CAD (coronary artery disease)     Dyslipidemia     End stage renal disease (H)     HTN (hypertension)      ELBERT (iron deficiency anemia)     Ischemic cardiomyopathy     Malignant neoplasm of lateral wall of urinary bladder (H)     Pulmonary nodules        CURRENT MEDICATIONS:  Current Outpatient Medications   Medication Sig Dispense Refill    aspirin (ASA) 81 MG chewable tablet Take 81 mg by mouth at bedtime.      carvedilol (COREG) 6.25 MG tablet Take 6.25 mg by mouth 2 times daily (with meals)      Coenzyme Q10 (COQ10 PO) Take by mouth every morning.      levothyroxine (SYNTHROID/LEVOTHROID) 200 MCG tablet Take 1 tablet by mouth every morning (before breakfast).      lisinopril (ZESTRIL) 10 MG tablet Take 10 mg by mouth at bedtime.      multivitamin w/minerals (MULTI-VITAMIN) tablet Take 1 tablet by mouth daily.      Naltrexone HCl, Pain, 4.5 MG CAPS Take 4.5 mg by mouth at bedtime.      nitroGLYcerin (NITROSTAT) 0.4 MG sublingual tablet Place 0.4 mg under the tongue every 5 minutes as needed.      Omega-3 Fatty Acids (OMEGA-3 FISH OIL PO) Take by mouth.      rosuvastatin (CRESTOR) 10 MG tablet Take 10 mg by mouth at bedtime.      sevelamer HCl (RENAGEL) 800 MG tablet Take 800 mg by mouth 3 times daily (with meals)      UNABLE TO FIND Inject 120 mcg subcutaneously every 30 days. MEDICATION NAME: Micera is given monthly as needed based on patient's Hgb results.Last dose End April 2025      UNABLE TO FIND Take 2 capsules by mouth 2 times daily. MEDICATION NAME: arterosil      UNABLE TO FIND Take 3 tablets by mouth 2 times daily. MEDICATION NAME: [employer]ics supplement      vitamin E 400 units TABS Take 400 Units by mouth every morning.         PAST SURGICAL HISTORY:  Past Surgical History:   Procedure Laterality Date    CV CORONARY ANGIOGRAM N/A 3/20/2025    Procedure: Coronary Angiogram;  Surgeon: Pepito Helton MD;  Location:  HEART CARDIAC CATH LAB    repaired ruptured globe Right     TOTAL HIP ARTHROPLASTY Left 04/29/2014    TOTAL HIP ARTHROPLASTY Right 09/02/2014    VASCULAR SURGERY      chest port,  peritoneal dialysis catheter       ALLERGIES:     Allergies   Allergen Reactions    Atorvastatin Muscle Pain (Myalgia)     Myalgias (Muscle pain)    Levofloxacin Other (See Comments)    Omeprazole Nausea and Vomiting     Patient denied - no intolerance or allergy to this    Amlodipine Palpitations       FAMILY HISTORY:  Family History   Problem Relation Age of Onset    Colon Cancer Mother 80    Coronary Artery Disease Mother     Diabetes Mother     Coronary Artery Disease Father     Thyroid Disease Father     Alcoholism Brother     Diabetes Brother     Anesthesia Reaction No family hx of          SOCIAL HISTORY:  Social History     Tobacco Use    Smoking status: Never     Passive exposure: Never    Smokeless tobacco: Never   Substance Use Topics    Alcohol use: Not Currently     Comment: Very little - maybe 2 drinks a year    Drug use: Never       ROS:   Constitutional: No fever, chills, or sweats. Weight stable.   Cardiovascular: As per HPI.       Exam:  /77 (BP Location: Right arm, Patient Position: Sitting, Cuff Size: Adult Regular)   Pulse 60   Wt 76 kg (167 lb 9.6 oz)   SpO2 100%   BMI 25.25 kg/m    GENERAL APPEARANCE: alert and no distress  HEENT: no icterus, no central cyanosis  LYMPH/NECK:  JVP not elevated  RESPIRATORY: lungs clear to auscultation - no rales, rhonchi or wheezes, no use of accessory muscles, no retractions, respirations are unlabored, normal respiratory rate  CARDIOVASCULAR: regular rhythm, normal S1, S2, no S3 or S4 and no murmur, click or rub, precordium quiet with normal PMI.  GI: soft, non tender  EXTREMITIES:no edema  NEURO: alert, normal speech,and affect  SKIN: no ecchymoses, no rashes     I have reviewed the labs and personally reviewed the imaging below and made my comment in the assessment and plan.    Labs:  CBC RESULTS:   Lab Results   Component Value Date    WBC 5.4 04/29/2025    RBC 3.49 (L) 04/29/2025    HGB 10.2 (L) 05/06/2025    HCT 32.9 (L) 04/29/2025    MCV 94  04/29/2025    MCH 31.2 04/29/2025    MCHC 33.1 04/29/2025    RDW 14.2 04/29/2025     04/29/2025       BMP RESULTS:  Lab Results   Component Value Date     04/29/2025    POTASSIUM 4.9 04/29/2025    CHLORIDE 93 (L) 04/29/2025    CO2 32 (H) 04/29/2025    ANIONGAP 11 04/29/2025     (H) 04/29/2025    BUN 59.0 (H) 04/29/2025    CR 12.01 (H) 04/29/2025    GFRESTIMATED 4 (L) 04/29/2025    ISIDRO 8.3 (L) 04/29/2025      EKG 5/1/2025:        Echocardiogram 7/29/2024:  Global and regional left ventricular function is normal with an EF of 55-60%.  Global right ventricular function is normal. The right ventricle is normal  size.  No significant valvular abnormalities present.  There is mild dilation at the level of the ascending aorta (4.2 cm, indexed  value 2.21 cm/m2).  IVC diameter <2.1 cm collapsing >50% with sniff suggests a normal RA pressure  of 3 mmHg.  There is no prior study for direct comparison.      Angiogram 3/20/25:    Ost LM to Mid LM lesion is 40% stenosed.    Ost LAD to Prox LAD lesion is 40% stenosed.    Prox LAD to Mid LAD lesion is 70% stenosed.    Mid LAD lesion is 70% stenosed.    1st Diag lesion is 50% stenosed.    Prox Cx lesion is 80% stenosed.    1st Mrg lesion is 40% stenosed.    Mid Cx to Dist Cx lesion is 70% stenosed.    Prox RCA lesion is 70% stenosed.    Prox RCA to Mid RCA lesion is 100% stenosed.     Moderate lesion of the proximal left main coronary artery.  Moderate diffuse disease of the left anterior descending artery. There are severe tandem 70% obstructive lesions distal to the takeoff of the first diagonal.  There is an 80% obstructive lesion of the proximal left circumflex artery as well as a 70% obstructive lesion distal to the takeoff of OM2.  There is a focal 70% obstructive lesion of the proximal right coronary artery. The previously placed drug-eluting stent has a 100% chronic in-stent restenosis. The distal vessel fills via R-R and L-R collaterals.  Successful  uncomplicated right radial arterial access with hemostasis by TR band placement.    Device check 12/11/24:    Routine remote transmission for this [employer] single chamber ICD.   -Programmed: VVI 40 bpm   -Presenting rhythm: VS 73 bpm   -Battery Status: 12.5 years   -Lead Status: Stable lead trending, within normal limits.  No concerns.   -Heart rate trending graphs show good rate variation.   -Pacing: <0.1%    -Atrial high rates: 0   -Ventricular high rates: 0   -ICD therapies: 0 since implant   -Short interval count: 0   -Optivol:  Stable lung fluid status.   Plan: No device concerns, home remote check every 3 months and return to clinic 10/2025 .   Indication: Cardiac Arrest        Assessment and Plan:   The patient is a 74 y.o M with PMHx of ESRD on peritoneal dialysis being planned for kidney transplant and multivessel obstructive CAD, scheduled for CABG on 22 May 2025 who is setting up Cardiology Care.    #CAD with inferior STEMI in September 2023 and VF cardiac arrest 9/6/23 s/p shockwave lithotripsy and SURESH to RCA and SURESH x1 to RPLA  #Multivessel obstructive CAD on most recent angiogram including moderate LM disease, being planned for CABG  # Ischemic cardiomyopathy with LVEF of 45% now recovered to 60%  # VT, used to be on amiodarone, now status post ICD placement  #ESRD on PD  # Hypertension well-controlled    Patient currently does not report any symptoms from a cardiac standpoint, no chest pain or shortness of breath, good exercise tolerance.  Blood pressure is well-controlled.  He is already on baby aspirin and Crestor 10 mg, we will check a lipid panel today, last LDL was 81 in January 2024.  Echo from July 2024 showing normal biventricular function without major valvular abnormalities.    Plan summary:  - Continue aspirin 81 mg daily  - Added on lipid panel to labs that were drawn yesterday, for now continue Crestor 10 mg, will adjust based on results  -Continue lisinopril 10 mg daily and  coreg 6.25 mg BID  -Please communicate with device nurses prior to surgery to turn off tachycardia therapies temporarily during surgery  -Will set up follow-up  in 3 months with cardiology JIAN s/p CABG for updated evaluation as part of prekidney transplant optimization    Latisha Lazo MD  PGY 5 Cardiology    Attending note 5/7/2025  Patient seen and examined with fellow in clinic today.  Pertinent portions of history and exam were independently verified by myself.  I agree with the exam and plan as outlined above.     I have personally spent 19 minutes including precharting, medical documentation, and face-to-face clinic visit     Letty WALL MD  [facility] Division of Cardiology  Securely message with [employer]     Addendum note 5/7/2025  Patient's LDL is 55.  Will continue with current dose of Crestor 10 mg.  Dr CAN

## 2025-05-06 NOTE — LETTER
2025      Cesar Rashid   [address] [facility] [address]      Dear Colleague,    Thank you for referring your patient, Cesar Rashid, to the [facility]. Please see a copy of my visit note below.        [facility] Hematology Consultation  [address] [place] [address]  Phone: [phone]    Outpatient Visit Note:    Patient: Cesar Rashid  MRN: [id number]  : 1950  ROXANNE: May 6, 2025    Reason for Consultation:  Cesar Rashid is a referred by anesthesia for evaluation and treatment of anemia in the setting of upcoming cardiac surgery and the patient being a [employer].     Assessment: Cesar Rashid is a 74 year old man with ESRD on peritoneal dialysis, CAD, chronic anemia, who is due for cardiac surgery (CABG) on May 22, 2025, but also refuses blood products. Given he is iron and vitamin replete, our only option outside of transfusion is EPO supplementation, so after discussion with Dr Valdivia (nephrology) we will double his dose of EPO the week before surgery.    Following surgery, he may also require daily EPO to keep Hgb at a goal of 10 or higher.    Recommendations:  Discussed with the patient about optimization of Hb prior to surgery with EPO.   Discussed the case with patient's nephrologist Dr. Loreta Valdivia who will double the dose as compared to prior EPO doses to aim for Hb in the range of 11-12 or higher.   Recommend initiation of EPO at 10,000 daily post operative till Hb > 10.   Patient understood the management decisions.     Case was discussed with Dr. Haque who was in agreement with the plan.     Oumar Santiago MD  Hematology/Oncology/BMT Fellow PGY4  Pager: [phone]    Physician Attestation  I saw this patient with the resident and agree with the resident s findings and plan of care as documented in the resident s note.      Briefly, Rob is a 74 year old man with ESRD on PD, CAD now  requiring CAGB, but hgb is not optimal at about 10 at most recent measure.  He is iron replete and has no vitamin deficiencies so his anemia is primarily due to ESRD.  In order to optimize his Hb for surgery, I discussed a plan with Dr Huerta (CT surgery), Dr Rizvi (anesthesia), and Dr Valdivia (nephrology) to increase his EPO dose the week before surgery and then daily EPO post op to increase Hgb to 10.  The patient and his wife understand and agree with the plan.    60 minutes spent by me on the date of the encounter doing chart review, review of outside records, review of test results, interpretation of tests, patient visit, documentation, and discussion with other provider(s)     Colton Haque MD  Associate Professor of Medicine, Division of Hematology, Oncology and Transplantation  [employer]     -------------------------------    History: Cesar Rashid is a 74 year old with PMHx as outlined below.     Medical history is notable for hyperlipidemia, hypertension, CAD with inferior STEMI and VF cardiac arrest 9/6/23, s/p shockwave lithotripsy and SURESH to RCA and SURESH x1 to RPLA, ischemic cardiomyopathy with LVEF of 45%, on amiodarone for VT, s/p ICD, pulmonary nodules, ANCA associated vasculitis, end-stage renal disease, on peritoneal dialysis, anemia of chronic disease, hypothyroidism, bladder cancer, and OA with h/o b/l ANALISA.     Surgical history is reviewed in the EMR     Medications are reviewed in the EMR     Family history is noncontributory    Patient endorsed that he is having cardiac surgery on May 22. He does not have any current symptoms at this point in time. Denied chest pain at rest, nausea, vomiting, abdominal pain, headache, changes in bowel or bladder habits, fevers, weightloss, night sweats.     Physical Exam:  Vitals: B/P: 135/80, T: 98.3, P: 62, R: 12, Wt: 168 lbs 1.6 oz Body mass index is 25.33 kg/m .   Exam:   Gen: Appears well, no distress  HEENT: no scleral  icterus or hemorrhage, no wet purpura, no lymphadenopathy  CV: regular, no murmurs  Pulm: clear  Abd: soft, nontender, no splenomegaly  Ext: no edema  Skin: no ecchymoses or hematomas  Neuro: no focal deficits, affect and cognition are normal    Labs:   Latest Reference Range & Units 07/29/24 14:01 03/20/25 10:53 04/29/25 15:30 05/04/25 10:55   WBC 4.0 - 11.0 10e3/uL 6.0 6.9 5.4    Hemoglobin 13.3 - 17.7 g/dL 10.0 (L) 11.9 (L) 10.9 (L) 10.6 (L)   Hematocrit 40.0 - 53.0 % 31.0 (L) 36.7 (L) 32.9 (L)    Platelet Count 150 - 450 10e3/uL 289 241 273    (L): Data is abnormally low     Latest Reference Range & Units 04/29/25 15:30 05/04/25 10:55   % Retic 0.5 - 2.0 % 0.9 0.7   Absolute Retic 0.025 - 0.095 10e6/uL 0.033 0.025      Latest Reference Range & Units 04/29/25 15:30   Ferritin 31 - 409 ng/mL 1,174 (H)   (H): Data is abnormally high      Imaging: Reviewed        Again, thank you for allowing me to participate in the care of your patient.        Sincerely,        Colton Haque MD    Electronically signed

## 2025-05-06 NOTE — PROGRESS NOTES
Infusion Nursing Note:  Cesar Rashid presents today for Venofer 3/3.    Patient seen by provider today: Yes: Dr Haque   present during visit today: Not Applicable.    Note: Patient reported to clinic today with no new complaints or concerns.    Intravenous Access:  Labs drawn without difficulty.  Peripheral IV placed.    Treatment Conditions:  Not Applicable.      Post Infusion Assessment:  Patient tolerated infusion without incident.  Patient observed for 30 minutes post Venofer 3/3 per protocol.  Blood return noted pre and post infusion.  Site patent and intact, free from redness, edema or discomfort.  No evidence of extravasations.  Access discontinued per protocol.       Discharge Plan:   Discharge instructions reviewed with: Patient.  Patient and/or family verbalized understanding of discharge instructions and all questions answered.  AVS to patient via [employer].  Patient will return 5/7/25 cardiology for next appointment.   Patient discharged in stable condition accompanied by: self.  Departure Mode: Ambulatory.      Napoleon Gerber RN

## 2025-05-07 ENCOUNTER — RESULTS FOLLOW-UP (OUTPATIENT)
Dept: CARDIOLOGY | Facility: CLINIC | Age: 75
End: 2025-05-07

## 2025-05-07 ENCOUNTER — OFFICE VISIT (OUTPATIENT)
Dept: CARDIOLOGY | Facility: CLINIC | Age: 75
End: 2025-05-07
Payer: COMMERCIAL

## 2025-05-07 VITALS
WEIGHT: 167.6 LBS | HEART RATE: 60 BPM | SYSTOLIC BLOOD PRESSURE: 136 MMHG | OXYGEN SATURATION: 100 % | DIASTOLIC BLOOD PRESSURE: 77 MMHG | BODY MASS INDEX: 25.25 KG/M2

## 2025-05-07 DIAGNOSIS — N18.6 ESRD (END STAGE RENAL DISEASE) ON DIALYSIS (H): ICD-10-CM

## 2025-05-07 DIAGNOSIS — I25.10 CORONARY ARTERY DISEASE INVOLVING NATIVE CORONARY ARTERY OF NATIVE HEART WITHOUT ANGINA PECTORIS: Primary | ICD-10-CM

## 2025-05-07 DIAGNOSIS — Z99.2 ESRD (END STAGE RENAL DISEASE) ON DIALYSIS (H): ICD-10-CM

## 2025-05-07 LAB
CHOLEST SERPL-MCNC: 115 MG/DL
HDLC SERPL-MCNC: 46 MG/DL
LDLC SERPL CALC-MCNC: 55 MG/DL
NONHDLC SERPL-MCNC: 69 MG/DL
TRIGL SERPL-MCNC: 72 MG/DL

## 2025-05-07 NOTE — PATIENT INSTRUCTIONS
Thank you for coming to the [facility] at [place]; please note the following instructions:    1. Fasting lab today    2. Follow up with Dr. Chung in 3 months        If you have any questions regarding your visit, please contact your care team:     CARDIOLOGY  TELEPHONE NUMBER   Deysi YOUNG., Registered Nurse  Angella COMER, Registered Nurse  Sonia GASPAR, Registered Medical Assistant  Zoila JOSE, Clinic Assistant  Zoila COMER, Visit Facilitator  Doyle GASPAR, Visit Facilitator [phone] (select option 1)    *After hours: [phone]   For Scheduling Appts:     [phone] (select option 1)    *After hours: [phone]   For the Device Clinic (Pacemakers and ICD's)  Yuridia ASHBY, Registered Nurse   During business hours: [phone]    *After business hours:  [phone] (select option 4)      Normal test result notifications will be released via [employer] or mailed within 7 business days.  All other test results, will be communicated via telephone once reviewed by your cardiologist.    If you need a medication refill, please contact your pharmacy.  Please allow 3 business days for your refill to be completed.    As always, thank you for trusting us with your health care needs!

## 2025-05-07 NOTE — NURSING NOTE
"Chief Complaint   Patient presents with    New Patient     Reason for visit: Return general cardiology for s/p angiogram       Initial /77 (BP Location: Right arm, Patient Position: Sitting, Cuff Size: Adult Regular)   Pulse 60   Wt 76 kg (167 lb 9.6 oz)   SpO2 100%   BMI 25.25 kg/m   Estimated body mass index is 25.25 kg/m  as calculated from the following:    Height as of 5/6/25: 1.735 m (5' 8.31\").    Weight as of this encounter: 76 kg (167 lb 9.6 oz)..  BP completed using cuff size: regular    COLEMAN Sanchez    "

## 2025-05-07 NOTE — LETTER
5/7/2025      RE: Cesar Rashid  [address] [facility] [address]       Dear Colleague,    Thank you for the opportunity to participate in the care of your patient, Cesar Rashid, at the [facility] HEART [facility]. Please see a copy of my visit note below.                                                                                               General Cardiology [facility]      HPI: Mr. Cesar Rashid is a 74 year old  male with PMH significant for    -HTN, well controlled  -CAD with inferior STEMI in September 2023 and VF cardiac arrest 9/6/23 s/p shockwave lithotripsy and SURESH to RCA and SURESH x1 to RPLA, ischemic cardiomyopathy with LVEF of 45% now recovered to 60%  -VT, used to be on amiodarone when he had MI; No longer on amiodarone  -Multivessel obstructive CAD  -ESRD on peritoneal dialysis being planned for kidney transplant  -ANCA vasculitis  -Chronic anemia, [employer]ism, not accepting blood tranfusions    He is planned for CABG with Dr. Huerta  on May 22, 2025 and is setting up Cardiology care. He underwent an angiogram as work-up pre-kidney transplant and it showed multivessel obstructive CAD including moderate LM disease, so decision to surgically revascularise was made.   Patient was admitted to [facility] in September 2023 after inferior STEMI, c/b VF arrest,underwent shockwave lithotripsy and SURESH to RCA and SURESH x1 to RPLA, also had ICD placed during that admission. EF was 45% then, it has now recovered to 60%.  He reports doing well since that admission from Cardiology standpoint. No episodes of chest pain. Exercise tolerance is good,he can walk for as long as he likes, can easily go up a flight of stairs. No episodes of VT on most recent device check. BP at home usually 110-120s SBP. He denies SOB, light-headedness, papitations, edema.     Lifelong non-smoker.  No alcohol abuse.    Medications,  personal, family, and social history reviewed with patient and revised.    PAST MEDICAL HISTORY:  Past Medical History:   Diagnosis Date     Acute ST elevation myocardial infarction (H)      ANCA-associated vasculitis (H)      Bladder cancer (H)      CAD (coronary artery disease)      Dyslipidemia      End stage renal disease (H)      HTN (hypertension)      ELBERT (iron deficiency anemia)      Ischemic cardiomyopathy      Malignant neoplasm of lateral wall of urinary bladder (H)      Pulmonary nodules        CURRENT MEDICATIONS:  Current Outpatient Medications   Medication Sig Dispense Refill     aspirin (ASA) 81 MG chewable tablet Take 81 mg by mouth at bedtime.       carvedilol (COREG) 6.25 MG tablet Take 6.25 mg by mouth 2 times daily (with meals)       Coenzyme Q10 (COQ10 PO) Take by mouth every morning.       levothyroxine (SYNTHROID/LEVOTHROID) 200 MCG tablet Take 1 tablet by mouth every morning (before breakfast).       lisinopril (ZESTRIL) 10 MG tablet Take 10 mg by mouth at bedtime.       multivitamin w/minerals (MULTI-VITAMIN) tablet Take 1 tablet by mouth daily.       Naltrexone HCl, Pain, 4.5 MG CAPS Take 4.5 mg by mouth at bedtime.       nitroGLYcerin (NITROSTAT) 0.4 MG sublingual tablet Place 0.4 mg under the tongue every 5 minutes as needed.       Omega-3 Fatty Acids (OMEGA-3 FISH OIL PO) Take by mouth.       rosuvastatin (CRESTOR) 10 MG tablet Take 10 mg by mouth at bedtime.       sevelamer HCl (RENAGEL) 800 MG tablet Take 800 mg by mouth 3 times daily (with meals)       UNABLE TO FIND Inject 120 mcg subcutaneously every 30 days. MEDICATION NAME: Micera is given monthly as needed based on patient's Hgb results.Last dose End April 2025       UNABLE TO FIND Take 2 capsules by mouth 2 times daily. MEDICATION NAME: arterosil       UNABLE TO FIND Take 3 tablets by mouth 2 times daily. MEDICATION NAME: [employer]ogenics supplement       vitamin E 400 units TABS Take 400 Units by mouth every morning.         PAST  SURGICAL HISTORY:  Past Surgical History:   Procedure Laterality Date     CV CORONARY ANGIOGRAM N/A 3/20/2025    Procedure: Coronary Angiogram;  Surgeon: Pepito Helton MD;  Location:  HEART CARDIAC CATH LAB     repaired ruptured globe Right      TOTAL HIP ARTHROPLASTY Left 04/29/2014     TOTAL HIP ARTHROPLASTY Right 09/02/2014     VASCULAR SURGERY      chest port, peritoneal dialysis catheter       ALLERGIES:     Allergies   Allergen Reactions     Atorvastatin Muscle Pain (Myalgia)     Myalgias (Muscle pain)     Levofloxacin Other (See Comments)     Omeprazole Nausea and Vomiting     Patient denied - no intolerance or allergy to this     Amlodipine Palpitations       FAMILY HISTORY:  Family History   Problem Relation Age of Onset     Colon Cancer Mother 80     Coronary Artery Disease Mother      Diabetes Mother      Coronary Artery Disease Father      Thyroid Disease Father      Alcoholism Brother      Diabetes Brother      Anesthesia Reaction No family hx of          SOCIAL HISTORY:  Social History     Tobacco Use     Smoking status: Never     Passive exposure: Never     Smokeless tobacco: Never   Substance Use Topics     Alcohol use: Not Currently     Comment: Very little - maybe 2 drinks a year     Drug use: Never       ROS:   Constitutional: No fever, chills, or sweats. Weight stable.   Cardiovascular: As per HPI.       Exam:  /77 (BP Location: Right arm, Patient Position: Sitting, Cuff Size: Adult Regular)   Pulse 60   Wt 76 kg (167 lb 9.6 oz)   SpO2 100%   BMI 25.25 kg/m    GENERAL APPEARANCE: alert and no distress  HEENT: no icterus, no central cyanosis  LYMPH/NECK:  JVP not elevated  RESPIRATORY: lungs clear to auscultation - no rales, rhonchi or wheezes, no use of accessory muscles, no retractions, respirations are unlabored, normal respiratory rate  CARDIOVASCULAR: regular rhythm, normal S1, S2, no S3 or S4 and no murmur, click or rub, precordium quiet with normal PMI.  GI: soft, non  tender  EXTREMITIES:no edema  NEURO: alert, normal speech,and affect  SKIN: no ecchymoses, no rashes     I have reviewed the labs and personally reviewed the imaging below and made my comment in the assessment and plan.    Labs:  CBC RESULTS:   Lab Results   Component Value Date    WBC 5.4 04/29/2025    RBC 3.49 (L) 04/29/2025    HGB 10.2 (L) 05/06/2025    HCT 32.9 (L) 04/29/2025    MCV 94 04/29/2025    MCH 31.2 04/29/2025    MCHC 33.1 04/29/2025    RDW 14.2 04/29/2025     04/29/2025       BMP RESULTS:  Lab Results   Component Value Date     04/29/2025    POTASSIUM 4.9 04/29/2025    CHLORIDE 93 (L) 04/29/2025    CO2 32 (H) 04/29/2025    ANIONGAP 11 04/29/2025     (H) 04/29/2025    BUN 59.0 (H) 04/29/2025    CR 12.01 (H) 04/29/2025    GFRESTIMATED 4 (L) 04/29/2025    ISIDRO 8.3 (L) 04/29/2025      EKG 5/1/2025:        Echocardiogram 7/29/2024:  Global and regional left ventricular function is normal with an EF of 55-60%.  Global right ventricular function is normal. The right ventricle is normal  size.  No significant valvular abnormalities present.  There is mild dilation at the level of the ascending aorta (4.2 cm, indexed  value 2.21 cm/m2).  IVC diameter <2.1 cm collapsing >50% with sniff suggests a normal RA pressure  of 3 mmHg.  There is no prior study for direct comparison.      Angiogram 3/20/25:     Ost LM to Mid LM lesion is 40% stenosed.     Ost LAD to Prox LAD lesion is 40% stenosed.     Prox LAD to Mid LAD lesion is 70% stenosed.     Mid LAD lesion is 70% stenosed.     1st Diag lesion is 50% stenosed.     Prox Cx lesion is 80% stenosed.     1st Mrg lesion is 40% stenosed.     Mid Cx to Dist Cx lesion is 70% stenosed.     Prox RCA lesion is 70% stenosed.     Prox RCA to Mid RCA lesion is 100% stenosed.     Moderate lesion of the proximal left main coronary artery.  Moderate diffuse disease of the left anterior descending artery. There are severe tandem 70% obstructive lesions distal to  the takeoff of the first diagonal.  There is an 80% obstructive lesion of the proximal left circumflex artery as well as a 70% obstructive lesion distal to the takeoff of OM2.  There is a focal 70% obstructive lesion of the proximal right coronary artery. The previously placed drug-eluting stent has a 100% chronic in-stent restenosis. The distal vessel fills via R-R and L-R collaterals.  Successful uncomplicated right radial arterial access with hemostasis by TR band placement.    Device check 12/11/24:    Routine remote transmission for this [employer] single chamber ICD.   -Programmed: VVI 40 bpm   -Presenting rhythm: VS 73 bpm   -Battery Status: 12.5 years   -Lead Status: Stable lead trending, within normal limits.  No concerns.   -Heart rate trending graphs show good rate variation.   -Pacing: <0.1%    -Atrial high rates: 0   -Ventricular high rates: 0   -ICD therapies: 0 since implant   -Short interval count: 0   -Optivol:  Stable lung fluid status.   Plan: No device concerns, home remote check every 3 months and return to clinic 10/2025 .   Indication: Cardiac Arrest        Assessment and Plan:   The patient is a 74 y.o M with PMHx of ESRD on peritoneal dialysis being planned for kidney transplant and multivessel obstructive CAD, scheduled for CABG on 22 May 2025 who is setting up Cardiology Care.    #CAD with inferior STEMI in September 2023 and VF cardiac arrest 9/6/23 s/p shockwave lithotripsy and SURESH to RCA and SURESH x1 to RPLA  #Multivessel obstructive CAD on most recent angiogram including moderate LM disease, being planned for CABG  # Ischemic cardiomyopathy with LVEF of 45% now recovered to 60%  # VT, used to be on amiodarone, now status post ICD placement  #ESRD on PD  # Hypertension well-controlled    Patient currently does not report any symptoms from a cardiac standpoint, no chest pain or shortness of breath, good exercise tolerance.  Blood pressure is well-controlled.  He is already on baby  aspirin and Crestor 10 mg, we will check a lipid panel today, last LDL was 81 in January 2024.  Echo from July 2024 showing normal biventricular function without major valvular abnormalities.    Plan summary:  - Continue aspirin 81 mg daily  - Added on lipid panel to labs that were drawn yesterday, for now continue Crestor 10 mg, will adjust based on results  -Continue lisinopril 10 mg daily and coreg 6.25 mg BID  -Please communicate with device nurses prior to surgery to turn off tachycardia therapies temporarily during surgery  -Will set up follow-up  in 3 months with cardiology JIAN s/p CABG for updated evaluation as part of prekidney transplant optimization    Latisha Lazo MD  PGY 5 Cardiology    Attending note 5/7/2025  Patient seen and examined with fellow in clinic today.  Pertinent portions of history and exam were independently verified by myself.  I agree with the exam and plan as outlined above.     I have personally spent 19 minutes including precharting, medical documentation, and face-to-face clinic visit     Letty WALL MD  [facility] Division of Cardiology  Securely message with [employer]         Please do not hesitate to contact me if you have any questions/concerns.     Sincerely,     Letty Wall MD

## 2025-05-08 ENCOUNTER — DOCUMENTATION ONLY (OUTPATIENT)
Dept: CARDIOLOGY | Facility: CLINIC | Age: 75
End: 2025-05-08
Payer: COMMERCIAL

## 2025-05-08 DIAGNOSIS — C67.2 MALIGNANT NEOPLASM OF LATERAL WALL OF URINARY BLADDER (H): Primary | ICD-10-CM

## 2025-05-08 NOTE — PROGRESS NOTES
"Emily Walker APRN CNS  Mendel Rizvi MD  Cc: Jolynn Walter, BIJU; Rebeca Rosas APRN CNP; Zoila Aggarwal, BIJU; Ridge Huerta MD  Letting you know that patient was seen by Dr. Haque yesterday. His summary-below    \"Given he is iron and vitamin replete, our only option outside of transfusion is EPO supplementation, so after discussion with Dr Valdivia (nephrology) we will double his dose of EPO the week before surgery.     Following surgery, he may also require daily EPO to keep Hgb at a goal of 10 or higher.     Recommendations:  1. Discussed with the patient about optimization of Hb prior to surgery with EPO.  2. Discussed the case with patient's nephrologist Dr. Loreta Valdivia who will double the dose as compared to prior EPO doses to aim for Hb in the range of 11-12 or higher.  3. Recommend initiation of EPO at 10,000 daily post operative till Hb > 10.  4. Patient understood the management decisions.     Case was discussed with Dr. Haque who was in agreement with the plan.\"      Rebeca Hayes and I just checking with you. Should these iron infusions continue?  "

## 2025-05-13 ENCOUNTER — TELEPHONE (OUTPATIENT)
Dept: SURGERY | Facility: CLINIC | Age: 75
End: 2025-05-13

## 2025-05-13 DIAGNOSIS — N18.6 END STAGE RENAL DISEASE (H): Primary | ICD-10-CM

## 2025-05-13 RX ORDER — DIPHENHYDRAMINE HYDROCHLORIDE 50 MG/ML
50 INJECTION, SOLUTION INTRAMUSCULAR; INTRAVENOUS
Status: CANCELLED
Start: 2025-05-13

## 2025-05-13 RX ORDER — ALBUTEROL SULFATE 0.83 MG/ML
2.5 SOLUTION RESPIRATORY (INHALATION)
Status: CANCELLED | OUTPATIENT
Start: 2025-05-13

## 2025-05-13 RX ORDER — EPINEPHRINE 1 MG/ML
0.3 INJECTION, SOLUTION INTRAMUSCULAR; SUBCUTANEOUS EVERY 5 MIN PRN
Status: CANCELLED | OUTPATIENT
Start: 2025-05-13

## 2025-05-13 RX ORDER — MEPERIDINE HYDROCHLORIDE 25 MG/ML
25 INJECTION INTRAMUSCULAR; INTRAVENOUS; SUBCUTANEOUS
Status: CANCELLED | OUTPATIENT
Start: 2025-05-13

## 2025-05-13 RX ORDER — DIPHENHYDRAMINE HYDROCHLORIDE 50 MG/ML
25 INJECTION, SOLUTION INTRAMUSCULAR; INTRAVENOUS
Status: CANCELLED
Start: 2025-05-13

## 2025-05-13 RX ORDER — ALBUTEROL SULFATE 90 UG/1
1-2 INHALANT RESPIRATORY (INHALATION)
Status: CANCELLED
Start: 2025-05-13

## 2025-05-13 RX ORDER — METHYLPREDNISOLONE SODIUM SUCCINATE 40 MG/ML
40 INJECTION INTRAMUSCULAR; INTRAVENOUS
Status: CANCELLED
Start: 2025-05-13

## 2025-05-13 RX ORDER — HEPARIN SODIUM,PORCINE 10 UNIT/ML
5-20 VIAL (ML) INTRAVENOUS DAILY PRN
Status: CANCELLED | OUTPATIENT
Start: 2025-05-13

## 2025-05-13 RX ORDER — HEPARIN SODIUM (PORCINE) LOCK FLUSH IV SOLN 100 UNIT/ML 100 UNIT/ML
5 SOLUTION INTRAVENOUS
Status: CANCELLED | OUTPATIENT
Start: 2025-05-13

## 2025-05-13 NOTE — TELEPHONE ENCOUNTER
Updated Cesar of his upcoming iron infusions - at [place] on 5/14, at [place] on 5/20.  Working on scheduling another infusion in between these, will follow up.  Cesar expressed understanding.  Jolynn Walter RN

## 2025-05-14 ENCOUNTER — INFUSION THERAPY VISIT (OUTPATIENT)
Dept: INFUSION THERAPY | Facility: CLINIC | Age: 75
End: 2025-05-14
Attending: NURSE PRACTITIONER
Payer: COMMERCIAL

## 2025-05-14 ENCOUNTER — APPOINTMENT (OUTPATIENT)
Dept: LAB | Facility: CLINIC | Age: 75
End: 2025-05-14
Payer: COMMERCIAL

## 2025-05-14 VITALS
SYSTOLIC BLOOD PRESSURE: 134 MMHG | HEART RATE: 61 BPM | TEMPERATURE: 98.4 F | DIASTOLIC BLOOD PRESSURE: 77 MMHG | RESPIRATION RATE: 16 BRPM | OXYGEN SATURATION: 100 %

## 2025-05-14 DIAGNOSIS — D63.1 ANEMIA DUE TO CHRONIC KIDNEY DISEASE, ON CHRONIC DIALYSIS (H): ICD-10-CM

## 2025-05-14 DIAGNOSIS — R79.9 ABNORMAL FINDING OF BLOOD CHEMISTRY, UNSPECIFIED: ICD-10-CM

## 2025-05-14 DIAGNOSIS — I25.10 CORONARY ARTERY DISEASE INVOLVING NATIVE CORONARY ARTERY OF NATIVE HEART WITHOUT ANGINA PECTORIS: Primary | ICD-10-CM

## 2025-05-14 DIAGNOSIS — I25.10 CORONARY ARTERY DISEASE INVOLVING NATIVE CORONARY ARTERY OF NATIVE HEART WITHOUT ANGINA PECTORIS: ICD-10-CM

## 2025-05-14 DIAGNOSIS — N18.6 END STAGE RENAL DISEASE (H): Primary | ICD-10-CM

## 2025-05-14 DIAGNOSIS — Z01.818 PREOP EXAMINATION: ICD-10-CM

## 2025-05-14 DIAGNOSIS — Z99.2 ANEMIA DUE TO CHRONIC KIDNEY DISEASE, ON CHRONIC DIALYSIS (H): ICD-10-CM

## 2025-05-14 DIAGNOSIS — N18.6 ANEMIA DUE TO CHRONIC KIDNEY DISEASE, ON CHRONIC DIALYSIS (H): ICD-10-CM

## 2025-05-14 DIAGNOSIS — R79.1 ABNORMAL COAGULATION PROFILE: ICD-10-CM

## 2025-05-14 LAB
HGB BLD-MCNC: 10.9 G/DL (ref 13.3–17.7)
IRON BINDING CAPACITY (ROCHE): 184 UG/DL (ref 240–430)
IRON SATN MFR SERPL: 19 % (ref 15–46)
IRON SERPL-MCNC: 35 UG/DL (ref 61–157)
MCV RBC AUTO: 93 FL (ref 78–100)
RETICS # AUTO: 0.1 10E6/UL (ref 0.03–0.1)
RETICS/RBC NFR AUTO: 2.8 % (ref 0.5–2)

## 2025-05-14 PROCEDURE — 250N000011 HC RX IP 250 OP 636: Performed by: CLINICAL NURSE SPECIALIST

## 2025-05-14 PROCEDURE — 85018 HEMOGLOBIN: CPT | Performed by: CLINICAL NURSE SPECIALIST

## 2025-05-14 PROCEDURE — 36415 COLL VENOUS BLD VENIPUNCTURE: CPT

## 2025-05-14 PROCEDURE — 96365 THER/PROPH/DIAG IV INF INIT: CPT

## 2025-05-14 PROCEDURE — 83550 IRON BINDING TEST: CPT | Performed by: CLINICAL NURSE SPECIALIST

## 2025-05-14 PROCEDURE — 258N000003 HC RX IP 258 OP 636: Performed by: CLINICAL NURSE SPECIALIST

## 2025-05-14 PROCEDURE — 85045 AUTOMATED RETICULOCYTE COUNT: CPT | Performed by: CLINICAL NURSE SPECIALIST

## 2025-05-14 RX ORDER — MEPERIDINE HYDROCHLORIDE 25 MG/ML
25 INJECTION INTRAMUSCULAR; INTRAVENOUS; SUBCUTANEOUS
OUTPATIENT
Start: 2025-05-16

## 2025-05-14 RX ORDER — DIPHENHYDRAMINE HYDROCHLORIDE 50 MG/ML
25 INJECTION, SOLUTION INTRAMUSCULAR; INTRAVENOUS
Start: 2025-05-16

## 2025-05-14 RX ORDER — HEPARIN SODIUM (PORCINE) LOCK FLUSH IV SOLN 100 UNIT/ML 100 UNIT/ML
5 SOLUTION INTRAVENOUS
OUTPATIENT
Start: 2025-05-16

## 2025-05-14 RX ORDER — ALBUTEROL SULFATE 0.83 MG/ML
2.5 SOLUTION RESPIRATORY (INHALATION)
OUTPATIENT
Start: 2025-05-16

## 2025-05-14 RX ORDER — EPINEPHRINE 1 MG/ML
0.3 INJECTION, SOLUTION, CONCENTRATE INTRAVENOUS EVERY 5 MIN PRN
OUTPATIENT
Start: 2025-05-16

## 2025-05-14 RX ORDER — METHYLPREDNISOLONE SODIUM SUCCINATE 40 MG/ML
40 INJECTION INTRAMUSCULAR; INTRAVENOUS
Start: 2025-05-16

## 2025-05-14 RX ORDER — DIPHENHYDRAMINE HYDROCHLORIDE 50 MG/ML
50 INJECTION, SOLUTION INTRAMUSCULAR; INTRAVENOUS
Start: 2025-05-16

## 2025-05-14 RX ORDER — ALBUTEROL SULFATE 90 UG/1
1-2 INHALANT RESPIRATORY (INHALATION)
Start: 2025-05-16

## 2025-05-14 RX ORDER — HEPARIN SODIUM,PORCINE 10 UNIT/ML
5-20 VIAL (ML) INTRAVENOUS DAILY PRN
OUTPATIENT
Start: 2025-05-16

## 2025-05-14 RX ADMIN — IRON SUCROSE 300 MG: 20 INJECTION, SOLUTION INTRAVENOUS at 13:40

## 2025-05-14 RX ADMIN — SODIUM CHLORIDE 250 ML: 0.9 INJECTION, SOLUTION INTRAVENOUS at 13:40

## 2025-05-14 ASSESSMENT — PAIN SCALES - GENERAL: PAINLEVEL_OUTOF10: NO PAIN (0)

## 2025-05-14 NOTE — PROGRESS NOTES
Infusion Nursing Note:  Cesar Rashid presents today for Venofer.    Patient seen by provider today: No   present during visit today: Not Applicable.    Note: Patient reported to clinic today with no new complaints or concerns.    Intravenous Access:  Peripheral IV placed.    Treatment Conditions:  Lab Results   Component Value Date    HGB 10.9 (L) 05/14/2025    WBC 5.4 04/29/2025    ANEU 3.7 07/29/2024     04/29/2025        Lab Results   Component Value Date     04/29/2025    POTASSIUM 4.9 04/29/2025    MAG 3.5 (H) 04/29/2025    CR 12.01 (H) 04/29/2025    ISIDRO 8.3 (L) 04/29/2025    BILITOTAL 0.2 04/29/2025    ALBUMIN 3.2 (L) 04/29/2025    ALT 20 04/29/2025    AST 26 04/29/2025     Post Infusion Assessment:  Patient tolerated infusion without incident.  Patient observed for 30 minutes post venofer per protocol.  Blood return noted pre and post infusion.  Site patent and intact, free from redness, edema or discomfort.  No evidence of extravasations.  Access discontinued per protocol.     Discharge Plan:   Discharge instructions reviewed with: Patient.  Patient and/or family verbalized understanding of discharge instructions and all questions answered.  AVS to patient via [address]  Patient will return 5/1725 for next appointment.   Patient discharged in stable condition accompanied by: wife.  Departure Mode: Ambulatory.      Napoleon Gerber RN

## 2025-05-14 NOTE — PATIENT INSTRUCTIONS
May 2025      Hase[place] Monday Tuesday Wednesday Thursday Friday Saturday                       1    PAC EVAL- Video Visit  11:45 AM   (60 min.)   Emily Walker, MARY ELLEN CNS   [facility]    ecg   1:10 PM   (20 min.)   [facility] EKG LAB   [facility]    Xray General   1:15 PM   (20 min.)   UCSCXR1   [facility] [place] [facility]    US LOWER EXTREMITY VENOUS MAPPING BILATERAL   1:45 PM   (60 min.)   UCSCUSV2   [facility]    US CAROTID BILATERAL   2:45 PM   (50 min.)   UCSCUSV2   [facility] 2    Infusion 120   1:00 PM   (120 min.)   [facility] INFUSION NURSE   [facility] 3       4    Infusion 120  11:00 AM   (120 min.)   [facility] INFUSION NURSE   [facility] 5     6    Non-Malignant Onc  10:15 AM   (60 min.)   Colton Haque MD   [facility]    Infusion Visit   1:30 PM   (90 min.)   [place] CAN[place]ER INFUSION NURSE   [facility]    Lab Peripheral   3:30 PM   (15 min.)   [place] FAST TRACK LAB   [facility] 7    New Cardiology   8:15 AM   (30 min.)   Letty Chung MD   [facility] 8     9     10       11     12     13     14    LAB   1:00 PM   (10 min.)   [facility]    Infusion Visit   1:00 PM   (120 min.)   [place] CANCER INFUSION NURSE   [facility] 15     16     17    Infusion 120  10:00 AM   (120 min.)   [facility] INFUSION NURSE   [facility]   18     19     20    Infusion Visit   1:00 PM   (120 min.)   PH INFUSION NURSE   [facility] [place]    LAB   3:00 PM   (15 min.)   PH LAB   [facility] 21      22    CORONARY ARTERY BYPASS GRAFT   7:30 AM   Ridge Huerta MD   [facility] OR 23     24       25     26     27     28     29     30     31 June 2025 Sunday Monday Tuesday Wednesday Thursday Friday Saturday   1     2     3     4     5     6     7       8     9     10     11     12     13     14       15     16     17     18     19     20     21       22     23     24     25     26     27     28       29     30                                                Lab Results:  Recent Results (from the past 12 hours)   Reticulocyte count    Collection Time: 05/14/25  1:16 PM   Result Value Ref Range    % Reticulocyte 2.8 (H) 0.5 - 2.0 %    Absolute Reticulocyte 0.098 (H) 0.025 - 0.095 10e6/uL   Hemoglobin    Collection Time: 05/14/25  1:16 PM   Result Value Ref Range    Hemoglobin 10.9 (L) 13.3 - 17.7 g/dL    MCV 93 78 - 100 fL   Iron and iron binding capacity    Collection Time: 05/14/25  1:16 PM   Result Value Ref Range    Iron 35 (L) 61 - 157 ug/dL    Iron Binding Capacity 184 (L) 240 - 430 ug/dL    Iron Sat Index 19 15 - 46 %   External Culture Results    Collection Time: 01/23/29 11:24 AM   Result Value Ref Range    Scan Lab Results (External) See Scanned Report

## 2025-05-17 ENCOUNTER — INFUSION THERAPY VISIT (OUTPATIENT)
Dept: INFUSION THERAPY | Facility: CLINIC | Age: 75
End: 2025-05-17
Attending: CLINICAL NURSE SPECIALIST
Payer: COMMERCIAL

## 2025-05-17 VITALS — SYSTOLIC BLOOD PRESSURE: 123 MMHG | HEART RATE: 57 BPM | OXYGEN SATURATION: 100 % | DIASTOLIC BLOOD PRESSURE: 72 MMHG

## 2025-05-17 DIAGNOSIS — Z01.818 PREOP EXAMINATION: ICD-10-CM

## 2025-05-17 DIAGNOSIS — Z99.2 ESRD ON PERITONEAL DIALYSIS (H): ICD-10-CM

## 2025-05-17 DIAGNOSIS — N18.6 ESRD ON PERITONEAL DIALYSIS (H): ICD-10-CM

## 2025-05-17 DIAGNOSIS — N18.6 ANEMIA DUE TO CHRONIC KIDNEY DISEASE, ON CHRONIC DIALYSIS (H): ICD-10-CM

## 2025-05-17 DIAGNOSIS — Z01.818 PRE-TRANSPLANT EVALUATION FOR KIDNEY TRANSPLANT: ICD-10-CM

## 2025-05-17 DIAGNOSIS — Z99.2 ANEMIA DUE TO CHRONIC KIDNEY DISEASE, ON CHRONIC DIALYSIS (H): ICD-10-CM

## 2025-05-17 DIAGNOSIS — D63.1 ANEMIA DUE TO CHRONIC KIDNEY DISEASE, ON CHRONIC DIALYSIS (H): ICD-10-CM

## 2025-05-17 DIAGNOSIS — N18.6 END STAGE RENAL DISEASE (H): Primary | ICD-10-CM

## 2025-05-17 LAB
HGB BLD-MCNC: 11.4 G/DL (ref 13.3–17.7)
IRON BINDING CAPACITY (ROCHE): 150 UG/DL (ref 240–430)
IRON SATN MFR SERPL: 22 % (ref 15–46)
IRON SERPL-MCNC: 33 UG/DL (ref 61–157)
MCV RBC AUTO: 93 FL (ref 78–100)
RETICS # AUTO: 0.13 10E6/UL (ref 0.03–0.1)
RETICS/RBC NFR AUTO: 3.6 % (ref 0.5–2)

## 2025-05-17 PROCEDURE — 250N000011 HC RX IP 250 OP 636: Mod: JZ | Performed by: CLINICAL NURSE SPECIALIST

## 2025-05-17 PROCEDURE — 83550 IRON BINDING TEST: CPT

## 2025-05-17 PROCEDURE — 85018 HEMOGLOBIN: CPT

## 2025-05-17 PROCEDURE — 96366 THER/PROPH/DIAG IV INF ADDON: CPT

## 2025-05-17 PROCEDURE — 85045 AUTOMATED RETICULOCYTE COUNT: CPT

## 2025-05-17 PROCEDURE — 258N000003 HC RX IP 258 OP 636: Performed by: CLINICAL NURSE SPECIALIST

## 2025-05-17 PROCEDURE — 36415 COLL VENOUS BLD VENIPUNCTURE: CPT

## 2025-05-17 PROCEDURE — 96365 THER/PROPH/DIAG IV INF INIT: CPT

## 2025-05-17 RX ORDER — HEPARIN SODIUM (PORCINE) LOCK FLUSH IV SOLN 100 UNIT/ML 100 UNIT/ML
5 SOLUTION INTRAVENOUS
OUTPATIENT
Start: 2025-05-18

## 2025-05-17 RX ORDER — METHYLPREDNISOLONE SODIUM SUCCINATE 40 MG/ML
40 INJECTION INTRAMUSCULAR; INTRAVENOUS
Start: 2025-05-18

## 2025-05-17 RX ORDER — ALBUTEROL SULFATE 90 UG/1
1-2 INHALANT RESPIRATORY (INHALATION)
Start: 2025-05-18

## 2025-05-17 RX ORDER — EPINEPHRINE 1 MG/ML
0.3 INJECTION, SOLUTION INTRAMUSCULAR; SUBCUTANEOUS EVERY 5 MIN PRN
OUTPATIENT
Start: 2025-05-18

## 2025-05-17 RX ORDER — HEPARIN SODIUM,PORCINE 10 UNIT/ML
5-20 VIAL (ML) INTRAVENOUS DAILY PRN
OUTPATIENT
Start: 2025-05-18

## 2025-05-17 RX ORDER — DIPHENHYDRAMINE HYDROCHLORIDE 50 MG/ML
50 INJECTION, SOLUTION INTRAMUSCULAR; INTRAVENOUS
Start: 2025-05-18

## 2025-05-17 RX ORDER — MEPERIDINE HYDROCHLORIDE 25 MG/ML
25 INJECTION INTRAMUSCULAR; INTRAVENOUS; SUBCUTANEOUS
OUTPATIENT
Start: 2025-05-18

## 2025-05-17 RX ORDER — DIPHENHYDRAMINE HYDROCHLORIDE 50 MG/ML
25 INJECTION, SOLUTION INTRAMUSCULAR; INTRAVENOUS
Start: 2025-05-18

## 2025-05-17 RX ORDER — ALBUTEROL SULFATE 0.83 MG/ML
2.5 SOLUTION RESPIRATORY (INHALATION)
OUTPATIENT
Start: 2025-05-18

## 2025-05-17 RX ADMIN — IRON SUCROSE 300 MG: 20 INJECTION, SOLUTION INTRAVENOUS at 10:27

## 2025-05-17 NOTE — PROGRESS NOTES
Nursing Note  Cesar Rashid [facility] for:   Chief Complaint   Patient presents with    Infusion     During today's [facility] appointment, orders from Emily Walker NP were completed.  Frequency: dose 2 of 3    Progress note:  Patient identification verified by name and date of birth.  Assessment completed.  Vitals recorded in Doc Flowsheets.  Patient was provided with education regarding medication/procedure and possible side effects.  Patient verbalized understanding.     present during visit today: Not Applicable.    Treatment Conditions: Labs drawn before infusion today.    Premedications: were not ordered.    Drug Waste Record: No    Infusion length and rate:  infusion given over approximately 90 minutes    Labs: were drawn per orders.     Vascular access: peripheral IV placed today.    Is the next appt scheduled? yes    Post Infusion Assessment:  Patient tolerated infusion without incident.  No evidence of extravasations.  Access discontinued per protocol.     Discharge Plan:   Follow up plan of care with: ordering provider as scheduled.  Discharge instructions were reviewed with patient.  Patient/representative verbalized understanding of discharge instructions and all questions answered.  [facility] in stable condition.    Debby Paz RN        /72 (BP Location: Left arm, Patient Position: Sitting, Cuff Size: Adult Regular)   Pulse 57   SpO2 100%     Administrations This Visit       iron sucrose (VENOFER) 300 mg in sodium chloride 0.9 % 290 mL intermittent infusion       Admin Date  05/17/2025 Action  $New Bag Dose  300 mg Rate  193.3 mL/hr Route  Intravenous Documented By  Debby Paz RN

## 2025-05-17 NOTE — PATIENT INSTRUCTIONS
Dear Cesar Rashid    Thank you for choosing [facility] ([facility] for your infusion.  The following information is a summary of our appointment as well as important reminders.      If you are a transplant patient and require transplant education, please click on this link: [email or web address].    If you have any questions on your upcoming Specialty Infusion appointments, please call scheduling at [phone].  It was a pleasure taking care of you today.    Sincerely,    [facility]  [facility]  [address] [place] [address]  [address]  Phone:  [phone]

## 2025-05-19 DIAGNOSIS — D50.8 IRON DEFICIENCY ANEMIA SECONDARY TO INADEQUATE DIETARY IRON INTAKE: ICD-10-CM

## 2025-05-19 DIAGNOSIS — C67.2 MALIGNANT NEOPLASM OF LATERAL WALL OF URINARY BLADDER (H): Primary | ICD-10-CM

## 2025-05-19 DIAGNOSIS — N18.6 END STAGE RENAL DISEASE (H): Primary | ICD-10-CM

## 2025-05-19 RX ORDER — DIPHENHYDRAMINE HYDROCHLORIDE 50 MG/ML
25 INJECTION, SOLUTION INTRAMUSCULAR; INTRAVENOUS
Status: CANCELLED
Start: 2025-05-19

## 2025-05-19 RX ORDER — MEPERIDINE HYDROCHLORIDE 25 MG/ML
25 INJECTION INTRAMUSCULAR; INTRAVENOUS; SUBCUTANEOUS
Status: CANCELLED | OUTPATIENT
Start: 2025-05-19

## 2025-05-19 RX ORDER — ALBUTEROL SULFATE 90 UG/1
1-2 INHALANT RESPIRATORY (INHALATION)
Status: CANCELLED
Start: 2025-05-19

## 2025-05-19 RX ORDER — ALBUTEROL SULFATE 0.83 MG/ML
2.5 SOLUTION RESPIRATORY (INHALATION)
Status: CANCELLED | OUTPATIENT
Start: 2025-05-19

## 2025-05-19 RX ORDER — HEPARIN SODIUM (PORCINE) LOCK FLUSH IV SOLN 100 UNIT/ML 100 UNIT/ML
5 SOLUTION INTRAVENOUS
Status: CANCELLED | OUTPATIENT
Start: 2025-05-19

## 2025-05-19 RX ORDER — DIPHENHYDRAMINE HYDROCHLORIDE 50 MG/ML
50 INJECTION, SOLUTION INTRAMUSCULAR; INTRAVENOUS
Status: CANCELLED
Start: 2025-05-19

## 2025-05-19 RX ORDER — METHYLPREDNISOLONE SODIUM SUCCINATE 40 MG/ML
40 INJECTION INTRAMUSCULAR; INTRAVENOUS
Status: CANCELLED
Start: 2025-05-19

## 2025-05-19 RX ORDER — EPINEPHRINE 1 MG/ML
0.3 INJECTION, SOLUTION INTRAMUSCULAR; SUBCUTANEOUS EVERY 5 MIN PRN
Status: CANCELLED | OUTPATIENT
Start: 2025-05-19

## 2025-05-19 RX ORDER — HEPARIN SODIUM,PORCINE 10 UNIT/ML
5-20 VIAL (ML) INTRAVENOUS DAILY PRN
Status: CANCELLED | OUTPATIENT
Start: 2025-05-19

## 2025-05-20 ENCOUNTER — INFUSION THERAPY VISIT (OUTPATIENT)
Dept: INFUSION THERAPY | Facility: CLINIC | Age: 75
End: 2025-05-20
Attending: NURSE PRACTITIONER
Payer: COMMERCIAL

## 2025-05-20 VITALS
SYSTOLIC BLOOD PRESSURE: 154 MMHG | WEIGHT: 164.8 LBS | HEART RATE: 69 BPM | TEMPERATURE: 98.9 F | RESPIRATION RATE: 16 BRPM | OXYGEN SATURATION: 98 % | BODY MASS INDEX: 24.83 KG/M2 | DIASTOLIC BLOOD PRESSURE: 76 MMHG

## 2025-05-20 DIAGNOSIS — Z01.818 PREOP EXAMINATION: ICD-10-CM

## 2025-05-20 DIAGNOSIS — N18.6 END STAGE RENAL DISEASE (H): ICD-10-CM

## 2025-05-20 DIAGNOSIS — Z99.2 ANEMIA DUE TO CHRONIC KIDNEY DISEASE, ON CHRONIC DIALYSIS (H): ICD-10-CM

## 2025-05-20 DIAGNOSIS — N18.6 ANEMIA DUE TO CHRONIC KIDNEY DISEASE, ON CHRONIC DIALYSIS (H): ICD-10-CM

## 2025-05-20 DIAGNOSIS — D50.8 IRON DEFICIENCY ANEMIA SECONDARY TO INADEQUATE DIETARY IRON INTAKE: Primary | ICD-10-CM

## 2025-05-20 DIAGNOSIS — D63.1 ANEMIA DUE TO CHRONIC KIDNEY DISEASE, ON CHRONIC DIALYSIS (H): ICD-10-CM

## 2025-05-20 LAB
HGB BLD-MCNC: 12.3 G/DL (ref 13.3–17.7)
IRON BINDING CAPACITY (ROCHE): 186 UG/DL (ref 240–430)
IRON SATN MFR SERPL: 16 % (ref 15–46)
IRON SERPL-MCNC: 29 UG/DL (ref 61–157)
MCV RBC AUTO: 92 FL (ref 78–100)
RETICS # AUTO: 0.14 10E6/UL (ref 0.03–0.1)
RETICS/RBC NFR AUTO: 3.5 % (ref 0.5–2)

## 2025-05-20 PROCEDURE — 85018 HEMOGLOBIN: CPT

## 2025-05-20 PROCEDURE — 83550 IRON BINDING TEST: CPT

## 2025-05-20 PROCEDURE — 36415 COLL VENOUS BLD VENIPUNCTURE: CPT

## 2025-05-20 PROCEDURE — 85045 AUTOMATED RETICULOCYTE COUNT: CPT

## 2025-05-20 PROCEDURE — 258N000003 HC RX IP 258 OP 636: Performed by: CLINICAL NURSE SPECIALIST

## 2025-05-20 PROCEDURE — 96366 THER/PROPH/DIAG IV INF ADDON: CPT

## 2025-05-20 PROCEDURE — 250N000011 HC RX IP 250 OP 636: Performed by: CLINICAL NURSE SPECIALIST

## 2025-05-20 PROCEDURE — 96365 THER/PROPH/DIAG IV INF INIT: CPT

## 2025-05-20 RX ORDER — HEPARIN SODIUM (PORCINE) LOCK FLUSH IV SOLN 100 UNIT/ML 100 UNIT/ML
5 SOLUTION INTRAVENOUS
OUTPATIENT
Start: 2025-05-22

## 2025-05-20 RX ORDER — METHYLPREDNISOLONE SODIUM SUCCINATE 40 MG/ML
40 INJECTION INTRAMUSCULAR; INTRAVENOUS
Start: 2025-05-22

## 2025-05-20 RX ORDER — EPINEPHRINE 1 MG/ML
0.3 INJECTION, SOLUTION, CONCENTRATE INTRAVENOUS EVERY 5 MIN PRN
OUTPATIENT
Start: 2025-05-22

## 2025-05-20 RX ORDER — ALBUTEROL SULFATE 90 UG/1
1-2 INHALANT RESPIRATORY (INHALATION)
Start: 2025-05-22

## 2025-05-20 RX ORDER — HEPARIN SODIUM,PORCINE 10 UNIT/ML
5-20 VIAL (ML) INTRAVENOUS DAILY PRN
OUTPATIENT
Start: 2025-05-22

## 2025-05-20 RX ORDER — DIPHENHYDRAMINE HYDROCHLORIDE 50 MG/ML
50 INJECTION, SOLUTION INTRAMUSCULAR; INTRAVENOUS
Start: 2025-05-22

## 2025-05-20 RX ORDER — MEPERIDINE HYDROCHLORIDE 25 MG/ML
25 INJECTION INTRAMUSCULAR; INTRAVENOUS; SUBCUTANEOUS
OUTPATIENT
Start: 2025-05-22

## 2025-05-20 RX ORDER — ALBUTEROL SULFATE 0.83 MG/ML
2.5 SOLUTION RESPIRATORY (INHALATION)
OUTPATIENT
Start: 2025-05-22

## 2025-05-20 RX ORDER — DIPHENHYDRAMINE HYDROCHLORIDE 50 MG/ML
25 INJECTION, SOLUTION INTRAMUSCULAR; INTRAVENOUS
Start: 2025-05-22

## 2025-05-20 RX ADMIN — IRON SUCROSE 300 MG: 20 INJECTION, SOLUTION INTRAVENOUS at 13:34

## 2025-05-20 RX ADMIN — SODIUM CHLORIDE 100 ML: 9 INJECTION, SOLUTION INTRAVENOUS at 13:30

## 2025-05-20 ASSESSMENT — PAIN SCALES - GENERAL: PAINLEVEL_OUTOF10: NO PAIN (0)

## 2025-05-20 NOTE — CONFIDENTIAL NOTE
FUTURE VISIT INFORMATION      SURGERY INFORMATION:  Date: 2025   Location:  [facility] OR  Surgeon:  Ridge Huerta MD   Anesthesia Type:  General  Procedure: CORONARY ARTERY BYPASS GRAFT AND ANY ASSOCIATED PROCEDURES      RECORDS REQUESTED FROM:       Primary Care Provider: Steffi Hatch MD     Pertinent Medical History: Stented coronary artery, ESRD on peritoneal dialysis, End stage renal disease, Acute renal failure, Acute renal failure, Pulmonary nodule, HTN (hypertension), ANCA-associated vasculitis, Atherosclerosis of coronary artery, Bradycardia, sinus, Anemia due to chronic kidney disease,     Most recent EKG+ Tracin2025    Most recent ECHO: 2024     Most recent Cardiac Stress Test: 2025 - [facility]    Most recent Coronary Angiogram: 2025

## 2025-05-20 NOTE — PROGRESS NOTES
Infusion Nursing Note:  Cesar Rashid presents today for Venofer.    Patient seen by provider today: No   present during visit today: Not Applicable.    Note: Arrives ambulatory, alert, pleasant, wife in attendance. Patient is receiving another Iron infusion today, last one was on 5/17. Receiving these routinely until his Open heart surgery on 6/3. Pt denies new sx or concerns. VSS, afebrile.       Intravenous Access:  Peripheral IV placed.    Treatment Conditions:  Not Applicable.  Labs drawn as ordered in Lab Dept.       Post Infusion Assessment:  Patient tolerated infusion without incident.  Blood return noted pre and post infusion.  Site patent and intact, free from redness, edema or discomfort.  No evidence of extravasations.  PIV access discontinued per protocol.       Discharge Plan:   AVS to patient via MYCHART.  Patient will return 5/25 for next Iron infusion at another site.  Patient discharged in stable condition accompanied by: wife.  Departure Mode: Ambulatory.      Josefa Reyez RN

## 2025-05-22 ENCOUNTER — TELEPHONE (OUTPATIENT)
Dept: CARDIOLOGY | Facility: CLINIC | Age: 75
End: 2025-05-22
Payer: COMMERCIAL

## 2025-05-22 DIAGNOSIS — C67.2 MALIGNANT NEOPLASM OF LATERAL WALL OF URINARY BLADDER (H): Primary | ICD-10-CM

## 2025-05-25 ENCOUNTER — INFUSION THERAPY VISIT (OUTPATIENT)
Dept: INFUSION THERAPY | Facility: CLINIC | Age: 75
End: 2025-05-25
Attending: CLINICAL NURSE SPECIALIST
Payer: COMMERCIAL

## 2025-05-25 VITALS
RESPIRATION RATE: 16 BRPM | OXYGEN SATURATION: 98 % | DIASTOLIC BLOOD PRESSURE: 77 MMHG | HEART RATE: 60 BPM | SYSTOLIC BLOOD PRESSURE: 157 MMHG | TEMPERATURE: 98.1 F

## 2025-05-25 DIAGNOSIS — Z99.2 ANEMIA DUE TO CHRONIC KIDNEY DISEASE, ON CHRONIC DIALYSIS (H): ICD-10-CM

## 2025-05-25 DIAGNOSIS — D50.8 IRON DEFICIENCY ANEMIA SECONDARY TO INADEQUATE DIETARY IRON INTAKE: ICD-10-CM

## 2025-05-25 DIAGNOSIS — N18.6 ANEMIA DUE TO CHRONIC KIDNEY DISEASE, ON CHRONIC DIALYSIS (H): ICD-10-CM

## 2025-05-25 DIAGNOSIS — D63.1 ANEMIA DUE TO CHRONIC KIDNEY DISEASE, ON CHRONIC DIALYSIS (H): ICD-10-CM

## 2025-05-25 DIAGNOSIS — N18.6 END STAGE RENAL DISEASE (H): Primary | ICD-10-CM

## 2025-05-25 DIAGNOSIS — Z01.818 PREOP EXAMINATION: ICD-10-CM

## 2025-05-25 LAB
HGB BLD-MCNC: 11.2 G/DL (ref 13.3–17.7)
IRON BINDING CAPACITY (ROCHE): 151 UG/DL (ref 240–430)
IRON SATN MFR SERPL: 30 % (ref 15–46)
IRON SERPL-MCNC: 46 UG/DL (ref 61–157)
MCV RBC AUTO: 93 FL (ref 78–100)
RETICS # AUTO: 0.1 10E6/UL (ref 0.03–0.1)
RETICS/RBC NFR AUTO: 3 % (ref 0.5–2)

## 2025-05-25 PROCEDURE — 96365 THER/PROPH/DIAG IV INF INIT: CPT

## 2025-05-25 PROCEDURE — 36415 COLL VENOUS BLD VENIPUNCTURE: CPT

## 2025-05-25 PROCEDURE — 250N000011 HC RX IP 250 OP 636: Performed by: CLINICAL NURSE SPECIALIST

## 2025-05-25 PROCEDURE — 83540 ASSAY OF IRON: CPT

## 2025-05-25 PROCEDURE — 258N000003 HC RX IP 258 OP 636: Performed by: CLINICAL NURSE SPECIALIST

## 2025-05-25 PROCEDURE — 85045 AUTOMATED RETICULOCYTE COUNT: CPT

## 2025-05-25 PROCEDURE — 85018 HEMOGLOBIN: CPT

## 2025-05-25 RX ORDER — DIPHENHYDRAMINE HYDROCHLORIDE 50 MG/ML
25 INJECTION, SOLUTION INTRAMUSCULAR; INTRAVENOUS
Status: CANCELLED
Start: 2025-05-25

## 2025-05-25 RX ORDER — DIPHENHYDRAMINE HYDROCHLORIDE 50 MG/ML
50 INJECTION, SOLUTION INTRAMUSCULAR; INTRAVENOUS
Status: CANCELLED
Start: 2025-05-25

## 2025-05-25 RX ORDER — METHYLPREDNISOLONE SODIUM SUCCINATE 40 MG/ML
40 INJECTION INTRAMUSCULAR; INTRAVENOUS
Status: CANCELLED
Start: 2025-05-25

## 2025-05-25 RX ORDER — ALBUTEROL SULFATE 0.83 MG/ML
2.5 SOLUTION RESPIRATORY (INHALATION)
Status: CANCELLED | OUTPATIENT
Start: 2025-05-25

## 2025-05-25 RX ORDER — ALBUTEROL SULFATE 90 UG/1
1-2 INHALANT RESPIRATORY (INHALATION)
Status: CANCELLED
Start: 2025-05-25

## 2025-05-25 RX ORDER — HEPARIN SODIUM,PORCINE 10 UNIT/ML
5-20 VIAL (ML) INTRAVENOUS DAILY PRN
Status: CANCELLED | OUTPATIENT
Start: 2025-05-25

## 2025-05-25 RX ORDER — MEPERIDINE HYDROCHLORIDE 25 MG/ML
25 INJECTION INTRAMUSCULAR; INTRAVENOUS; SUBCUTANEOUS
Status: CANCELLED | OUTPATIENT
Start: 2025-05-25

## 2025-05-25 RX ORDER — HEPARIN SODIUM (PORCINE) LOCK FLUSH IV SOLN 100 UNIT/ML 100 UNIT/ML
5 SOLUTION INTRAVENOUS
Status: CANCELLED | OUTPATIENT
Start: 2025-05-25

## 2025-05-25 RX ORDER — EPINEPHRINE 1 MG/ML
0.3 INJECTION, SOLUTION INTRAMUSCULAR; SUBCUTANEOUS EVERY 5 MIN PRN
Status: CANCELLED | OUTPATIENT
Start: 2025-05-25

## 2025-05-25 RX ADMIN — IRON SUCROSE 300 MG: 20 INJECTION, SOLUTION INTRAVENOUS at 12:15

## 2025-05-25 ASSESSMENT — PAIN SCALES - GENERAL: PAINLEVEL_OUTOF10: NO PAIN (0)

## 2025-05-25 NOTE — PATIENT INSTRUCTIONS
Dear Cesar Rashid    Thank you for choosing [facility] ([facility]) for your infusion.  The following information is a summary of our appointment as well as important reminders.      If you have any questions on your upcoming Specialty Infusion appointments, please call scheduling at [phone].  It was a pleasure taking care of you today.    Sincerely,    [facility]  [facility]  [address] [place] [address]  [address]  Phone:  [phone]

## 2025-05-25 NOTE — PROGRESS NOTES
Infusion Nursing Note:  Cesar Rashid presents today for venofer.    Patient seen by provider today: No   present during visit today: Not Applicable.    Note: Pt getting multiple doses of iron prior to surgery on 6/2/25. Due to Yarsani reasons, he does not want to be transfused at time of surgery. Labs drawn per orders prior to iron infusion. Pt scheduled for 2 more doses prior to surgery per his scheduling preference.     Intravenous Access:  Labs drawn without difficulty.  Peripheral IV placed.    Treatment Conditions:  Not Applicable.    Post Infusion Assessment:  Patient tolerated infusion without incident.  Site patent and intact, free from redness, edema or discomfort.  Access discontinued per protocol.     Discharge Plan:   Patient and/or family verbalized understanding of discharge instructions and all questions answered.  Patient discharged in stable condition accompanied by: wife.  Appointment updated from 5/29 to 5/28 to coincide with patient's other pre-op appointments. Pt and wife updated.    Administrations This Visit       iron sucrose (VENOFER) 300 mg in sodium chloride 0.9 % 290 mL intermittent infusion       Admin Date  05/25/2025 Action  $New Bag Dose  300 mg Rate  193.3 mL/hr Route  Intravenous Documented By  Carmina Benoit RN                  BP (!) 143/81 (BP Location: Left arm, Patient Position: Sitting, Cuff Size: Adult Regular)   Pulse 57   Temp 98.1  F (36.7  C) (Oral)   Resp 16   SpO2 98%     Carmina Benoit RN

## 2025-05-27 LAB
ABO + RH BLD: NORMAL
BLD GP AB SCN SERPL QL: NEGATIVE
SPECIMEN EXP DATE BLD: NORMAL

## 2025-05-28 ENCOUNTER — ANCILLARY PROCEDURE (OUTPATIENT)
Dept: GENERAL RADIOLOGY | Facility: CLINIC | Age: 75
End: 2025-05-28
Attending: SURGERY
Payer: COMMERCIAL

## 2025-05-28 ENCOUNTER — OFFICE VISIT (OUTPATIENT)
Dept: SURGERY | Facility: CLINIC | Age: 75
End: 2025-05-28
Payer: COMMERCIAL

## 2025-05-28 ENCOUNTER — INFUSION THERAPY VISIT (OUTPATIENT)
Dept: INFUSION THERAPY | Facility: CLINIC | Age: 75
End: 2025-05-28
Attending: CLINICAL NURSE SPECIALIST
Payer: COMMERCIAL

## 2025-05-28 ENCOUNTER — PRE VISIT (OUTPATIENT)
Dept: SURGERY | Facility: CLINIC | Age: 75
End: 2025-05-28

## 2025-05-28 ENCOUNTER — LAB (OUTPATIENT)
Dept: LAB | Facility: CLINIC | Age: 75
End: 2025-05-28
Payer: COMMERCIAL

## 2025-05-28 VITALS
WEIGHT: 163 LBS | SYSTOLIC BLOOD PRESSURE: 128 MMHG | TEMPERATURE: 98.3 F | BODY MASS INDEX: 24.14 KG/M2 | HEIGHT: 69 IN | DIASTOLIC BLOOD PRESSURE: 77 MMHG | HEART RATE: 67 BPM | RESPIRATION RATE: 16 BRPM | OXYGEN SATURATION: 100 %

## 2025-05-28 VITALS
HEART RATE: 69 BPM | RESPIRATION RATE: 16 BRPM | TEMPERATURE: 98 F | SYSTOLIC BLOOD PRESSURE: 144 MMHG | DIASTOLIC BLOOD PRESSURE: 80 MMHG | OXYGEN SATURATION: 100 %

## 2025-05-28 DIAGNOSIS — Z99.2 ANEMIA DUE TO CHRONIC KIDNEY DISEASE, ON CHRONIC DIALYSIS (H): ICD-10-CM

## 2025-05-28 DIAGNOSIS — R79.9 ABNORMAL FINDING OF BLOOD CHEMISTRY, UNSPECIFIED: ICD-10-CM

## 2025-05-28 DIAGNOSIS — I25.10 CORONARY ARTERY DISEASE INVOLVING NATIVE CORONARY ARTERY OF NATIVE HEART WITHOUT ANGINA PECTORIS: ICD-10-CM

## 2025-05-28 DIAGNOSIS — D50.8 IRON DEFICIENCY ANEMIA SECONDARY TO INADEQUATE DIETARY IRON INTAKE: Primary | ICD-10-CM

## 2025-05-28 DIAGNOSIS — N18.6 END STAGE RENAL DISEASE (H): ICD-10-CM

## 2025-05-28 DIAGNOSIS — Z01.818 PREOP EXAMINATION: Primary | ICD-10-CM

## 2025-05-28 DIAGNOSIS — N18.6 ANEMIA DUE TO CHRONIC KIDNEY DISEASE, ON CHRONIC DIALYSIS (H): ICD-10-CM

## 2025-05-28 DIAGNOSIS — D63.1 ANEMIA DUE TO CHRONIC KIDNEY DISEASE, ON CHRONIC DIALYSIS (H): ICD-10-CM

## 2025-05-28 DIAGNOSIS — Z01.818 PREOP EXAMINATION: ICD-10-CM

## 2025-05-28 DIAGNOSIS — R79.1 ABNORMAL COAGULATION PROFILE: ICD-10-CM

## 2025-05-28 LAB
ALBUMIN SERPL BCG-MCNC: 3.1 G/DL (ref 3.5–5.2)
ALP SERPL-CCNC: 96 U/L (ref 40–150)
ALT SERPL W P-5'-P-CCNC: 16 U/L (ref 0–70)
ANION GAP SERPL CALCULATED.3IONS-SCNC: 16 MMOL/L (ref 7–15)
APTT PPP: 31 SECONDS (ref 22–38)
AST SERPL W P-5'-P-CCNC: 24 U/L (ref 0–45)
ATRIAL RATE - MUSE: 63 BPM
BILIRUB SERPL-MCNC: 0.3 MG/DL
BUN SERPL-MCNC: 39.4 MG/DL (ref 8–23)
CALCIUM SERPL-MCNC: 9 MG/DL (ref 8.8–10.4)
CHLORIDE SERPL-SCNC: 94 MMOL/L (ref 98–107)
CREAT SERPL-MCNC: 10.8 MG/DL (ref 0.67–1.17)
DIASTOLIC BLOOD PRESSURE - MUSE: NORMAL MMHG
EGFRCR SERPLBLD CKD-EPI 2021: 5 ML/MIN/1.73M2
ERYTHROCYTE [DISTWIDTH] IN BLOOD BY AUTOMATED COUNT: 16.6 % (ref 10–15)
EST. AVERAGE GLUCOSE BLD GHB EST-MCNC: 94 MG/DL
GLUCOSE SERPL-MCNC: 97 MG/DL (ref 70–99)
HBA1C MFR BLD: 4.9 %
HCO3 SERPL-SCNC: 24 MMOL/L (ref 22–29)
HCT VFR BLD AUTO: 38.9 % (ref 40–53)
HGB BLD-MCNC: 13 G/DL (ref 13.3–17.7)
INR PPP: 1.03 (ref 0.85–1.15)
INTERPRETATION ECG - MUSE: NORMAL
IRON BINDING CAPACITY (ROCHE): 167 UG/DL (ref 240–430)
IRON SATN MFR SERPL: 28 % (ref 15–46)
IRON SERPL-MCNC: 47 UG/DL (ref 61–157)
MAGNESIUM SERPL-MCNC: 2.4 MG/DL (ref 1.7–2.3)
MCH RBC QN AUTO: 31 PG (ref 26.5–33)
MCHC RBC AUTO-ENTMCNC: 33.4 G/DL (ref 31.5–36.5)
MCV RBC AUTO: 93 FL (ref 78–100)
P AXIS - MUSE: 44 DEGREES
PLATELET # BLD AUTO: 319 10E3/UL (ref 150–450)
POTASSIUM SERPL-SCNC: 3.3 MMOL/L (ref 3.4–5.3)
PR INTERVAL - MUSE: 204 MS
PREALB SERPL-MCNC: 22.2 MG/DL (ref 20–40)
PROT SERPL-MCNC: 6.3 G/DL (ref 6.4–8.3)
PROTHROMBIN TIME: 13.8 SECONDS (ref 11.8–14.8)
QRS DURATION - MUSE: 106 MS
QT - MUSE: 464 MS
QTC - MUSE: 474 MS
R AXIS - MUSE: 25 DEGREES
RBC # BLD AUTO: 4.19 10E6/UL (ref 4.4–5.9)
RETICS # AUTO: 0.16 10E6/UL (ref 0.03–0.1)
RETICS/RBC NFR AUTO: 3.8 % (ref 0.5–2)
SODIUM SERPL-SCNC: 134 MMOL/L (ref 135–145)
SYSTOLIC BLOOD PRESSURE - MUSE: NORMAL MMHG
T AXIS - MUSE: -4 DEGREES
VENTRICULAR RATE- MUSE: 63 BPM
WBC # BLD AUTO: 6.9 10E3/UL (ref 4–11)

## 2025-05-28 PROCEDURE — 96365 THER/PROPH/DIAG IV INF INIT: CPT

## 2025-05-28 PROCEDURE — 84134 ASSAY OF PREALBUMIN: CPT | Performed by: SURGERY

## 2025-05-28 PROCEDURE — 96366 THER/PROPH/DIAG IV INF ADDON: CPT

## 2025-05-28 PROCEDURE — 71046 X-RAY EXAM CHEST 2 VIEWS: CPT | Performed by: RADIOLOGY

## 2025-05-28 PROCEDURE — 99000 SPECIMEN HANDLING OFFICE-LAB: CPT | Performed by: PATHOLOGY

## 2025-05-28 PROCEDURE — 83036 HEMOGLOBIN GLYCOSYLATED A1C: CPT | Performed by: SURGERY

## 2025-05-28 PROCEDURE — 258N000003 HC RX IP 258 OP 636: Performed by: CLINICAL NURSE SPECIALIST

## 2025-05-28 PROCEDURE — 250N000011 HC RX IP 250 OP 636: Mod: JZ | Performed by: CLINICAL NURSE SPECIALIST

## 2025-05-28 RX ORDER — EPINEPHRINE 1 MG/ML
0.3 INJECTION, SOLUTION INTRAMUSCULAR; SUBCUTANEOUS EVERY 5 MIN PRN
OUTPATIENT
Start: 2025-05-30

## 2025-05-28 RX ORDER — DIPHENHYDRAMINE HYDROCHLORIDE 50 MG/ML
50 INJECTION, SOLUTION INTRAMUSCULAR; INTRAVENOUS
Start: 2025-05-30

## 2025-05-28 RX ORDER — MEPERIDINE HYDROCHLORIDE 25 MG/ML
25 INJECTION INTRAMUSCULAR; INTRAVENOUS; SUBCUTANEOUS
OUTPATIENT
Start: 2025-05-30

## 2025-05-28 RX ORDER — HEPARIN SODIUM (PORCINE) LOCK FLUSH IV SOLN 100 UNIT/ML 100 UNIT/ML
5 SOLUTION INTRAVENOUS
OUTPATIENT
Start: 2025-05-30

## 2025-05-28 RX ORDER — HEPARIN SODIUM,PORCINE 10 UNIT/ML
5-20 VIAL (ML) INTRAVENOUS DAILY PRN
OUTPATIENT
Start: 2025-05-30

## 2025-05-28 RX ORDER — ALBUTEROL SULFATE 0.83 MG/ML
2.5 SOLUTION RESPIRATORY (INHALATION)
OUTPATIENT
Start: 2025-05-30

## 2025-05-28 RX ORDER — METHYLPREDNISOLONE SODIUM SUCCINATE 40 MG/ML
40 INJECTION INTRAMUSCULAR; INTRAVENOUS
Start: 2025-05-30

## 2025-05-28 RX ORDER — DIPHENHYDRAMINE HYDROCHLORIDE 50 MG/ML
25 INJECTION, SOLUTION INTRAMUSCULAR; INTRAVENOUS
Start: 2025-05-30

## 2025-05-28 RX ORDER — ALBUTEROL SULFATE 90 UG/1
1-2 INHALANT RESPIRATORY (INHALATION)
Start: 2025-05-30

## 2025-05-28 RX ADMIN — IRON SUCROSE 300 MG: 20 INJECTION, SOLUTION INTRAVENOUS at 14:22

## 2025-05-28 ASSESSMENT — PAIN SCALES - GENERAL
PAINLEVEL_OUTOF10: NO PAIN (0)
PAINLEVEL_OUTOF10: MILD PAIN (3)

## 2025-05-28 ASSESSMENT — ENCOUNTER SYMPTOMS
SEIZURES: 0
DYSRHYTHMIAS: 1

## 2025-05-28 ASSESSMENT — LIFESTYLE VARIABLES: TOBACCO_USE: 0

## 2025-05-28 NOTE — PROGRESS NOTES
Infusion Nursing Note:  Cesar Rashid presents today for Venofer (dose 2 of 3).    Patient seen by provider today: No   present during visit today: Not Applicable.    Note: Venofer infused over 90 mins.    Pt's BP was elevated on arrival. He reports it was fine at his appointment earlier today, but he is nervous for the infusion appointment.    Intravenous Access:  Peripheral IV placed.    Treatment Conditions:  Not Applicable.    BP (!) 184/95 (BP Location: Right arm, Patient Position: Semi-Burns's, Cuff Size: Adult Regular)   Pulse 61   Temp 98  F (36.7  C) (Oral)   Resp 16   SpO2 100%     Administrations This Visit       iron sucrose (VENOFER) 300 mg in sodium chloride 0.9 % 290 mL intermittent infusion       Admin Date  05/28/2025 Action  $New Bag Dose  300 mg Rate  193.3 mL/hr Route  Intravenous Documented By  Dionna Rutherford, BIJU                  Report was given to late shift RN at 1430, care was transferred at that time.    Dionna Rutherford RN

## 2025-05-28 NOTE — PROGRESS NOTES
Infusion Nursing Note:  Cesar Rashid presents today for venofer.      Post Infusion Assessment:  Patient tolerated infusion without incident.  Site patent and intact, free from redness, edema or discomfort.  Access discontinued per protocol.     Discharge Plan:   Patient and/or family verbalized understanding of discharge instructions and all questions answered.  Patient discharged in stable condition accompanied by: wife.      Carmina Benoit RN

## 2025-05-28 NOTE — PATIENT INSTRUCTIONS
Preparing for Your Surgery      Name:  Cesar Rashid   MRN:  [id number]   :  1950   Today's Date:  2025     The [place] [employer] [address] Construction Project                                Timeline 2025 -2025    This project will affect travel to the [facility] and [facility], as well as the [facility].      Please check the [facility] [address] project website for the most up to date information and give yourself additional time to reach your destination.      Arriving for surgery:  Surgery date:  2025  Arrival time:  5:00 am    Please come to:       [facility]    [address] [place]  [address]     The [facility] Patient/Visitor Ramp is at [address]. Patients and visitors who self-park will receive the reduced hospital parking rate. If the Patient /Visitor Ramp is full, please follow the signs to the [employer] car park located at the [facility] entrance.      [employer] parking is available (24 hours/ 7 days a week)      Discounted parking pass options are available for patients and visitors. They can be purchased at the [employer] desk at the [facility] entrance.     -    Stop at the security desk and they will direct surgery patients to the Surgery Check in and Family Lounge. [phone]        - If you need directions, a wheelchair or an escort please stop at the Information/security desk in the lobby.     What can I eat or drink?  -  You may eat and drink normally up to 8 hours prior to arrival time. (Until 9:00 pm on 2025)  -  You may have clear liquids until 2 hours prior to arrival time. (Until 3:00 am)    Examples of clear liquids:  Water  Clear broth  Juices (apple, white grape, white cranberry  and cider) without pulp  Noncarbonated, powder based beverages  (lemonade and  Cory-Aid)  Sodas (Sprite, 7-Up, ginger ale and seltzer)  Coffee or tea (without milk or cream)  Gatorade    -  No Alcohol or cannabis products for at least 24 hours before surgery.     Which medicines can I take?    **Hold Multivitamins for 7 days before surgery, including Vitamin E.   **Hold Supplements for 7 days before surgery, including coenzyme Q10, Omega-3 Fatty Acids, Arterosil and Cardiogenics.   Hold Ibuprofen (Advil, Motrin) for  day(s) before surgery--unless otherwise directed by surgeon.  Hold Naproxen (Aleve) for 7 days before surgery.    **Hold Naltrexone for 3 days prior to surgery. Last dose is on 5/29/2025.    **Take evening/bedtime medications as prescribed the evening before surgery, EXCEPT Aspirin and Lisinopril.     **Hold Aspirin for 5 days prior to the procedure. Last dose is 5/27/2025.     **Hold Lisinopril (Zestril) for 2 days prior to surgery.  Last dose is on 5/30/2025.     -  DO NOT take these medications the day of surgery:    Sevelamer (Renagel)     -  PLEASE TAKE these medications the day of surgery:    Carvedilol (Coreg)  Levothyroxine (Synthroid)   Nitroglycerin (Nitrostat), if needed      How do I prepare myself?  - Please take 2 showers (one the night prior to surgery and one the morning of surgery) using Scrubcare or Hibiclens soap.    Use this soap only from the neck to your toes. Avoid genital area      Leave the soap on your skin for one minute--then rinse thoroughly.      You may use your own shampoo and conditioner. No other hair products.   - Please remove all jewelry and body piercings.  - No lotions, deodorants or fragrance.  - No makeup or fingernail polish.   - Bring your ID and insurance card.    -For patients being admitted to the [facility]  Family members are to take the patient belongings with them and place them in the lockers provided in the [facility] Lounge.  Please limit the items you bring to 1 bag as the lockers are small.      -If you use a CPAP machine,  please bring the CPAP machine, tubing, and mask to hospital.    -If you have a Deep Brain Stimulator, Spinal Cord Stimulator, or any Neuro Stimulator device---you must bring the remote control to the hospital.      ALL PATIENTS GOING HOME THE SAME DAY OF SURGERY ARE REQUIRED TO HAVE A RESPONSIBLE ADULT TO DRIVE AND BE IN ATTENDANCE WITH THEM FOR 24 HOURS FOLLOWING SURGERY.    Covid testing policy as of 12/06/2022  Your surgeon will notify and schedule you for a COVID test if one is needed before surgery--please direct any questions or COVID symptoms to your surgeon      Questions or Concerns:    - For any questions regarding the day of surgery or your hospital stay, please contact the Pre Admission Nursing Office at [phone].       - If you have health changes between today and your surgery, please call your surgeon.       - For questions after surgery, please call your surgeons office.           Current Visitor Guidelines    2 adult visitors for adult patients in the pre op area    If additional visitors come (beyond a patient care attendant or a group home caregiver), the additional visitors will be asked to wait in the main lobby of the hospital    Visiting hours: 8 a.m. to 8:30 p.m.    Patients confirmed or suspected to have symptoms of COVID 19 or flu:     No visitors allowed for adult patients.   Children (under age 18) can have 1 named visitor.     People who are sick or showing symptoms of COVID 19 or flu:    Are not allowed to visit patients--we can only make exceptions in special situations.       Please follow these guidelines for your visit:          Please maintain social distance          Masking is optional--however at times you may be asked to wear a mask for the safety of yourself and others     Clean your hands with alcohol hand . Do this when you arrive at and leave the building and patient room,    And again after you touch your mask or anything in the room.     Go directly to and  from the room you are visiting.     Stay in the patient s room during your visit. Limit going to other places in the hospital as much as possible     Leave bags and jackets at home or in the car.     For everyone s health, please don t come and go during your visit. That includes for smoking   during your visit.

## 2025-05-28 NOTE — OUTPATIENT NURSE NOTE
05/28/25 1351   Discharge Planning   Patient/Family Anticipates Transition to home with family   Living Arrangements   People in Home spouse   Type of Residence Private Residence   Is your private residence a single family home or apartment? Single family home   Number of Stairs, Within Home, Primary two   Stair Railings, Within Home, Primary railings safe and in good condition   Once home, are you able to live on one level? Yes   Which level? Main Level   Bathroom Shower/Tub Walk-in shower   Equipment Currently Used at Home none   Support System   Clinic recommendation is to have someone available to stay with you for two weeks once home to support you, meal preparation, household tasks, etc. Do you have this support?  No   Name Of Support Person? Rafaela (spouse)   Following surgery, you will not be able to drive for one month and will need support bringing you to and from appointments. Who will drive you home after your hospital discharge? Rafaela   Relationship/Environment   Name(s) of People in Home Rafaela

## 2025-05-28 NOTE — PATIENT INSTRUCTIONS
Dear Cesar Rashid    Thank you for choosing [facility] ([facility]) for your infusion.  The following information is a summary of our appointment as well as important reminders.      If you have any questions on your upcoming Specialty Infusion appointments, please call scheduling at [phone].  It was a pleasure taking care of you today.    Sincerely,    [facility]  [facility]  [address] [place] [address]  [address]  Phone:  [phone]

## 2025-05-28 NOTE — H&P
Pre-Operative H & P     CC:  Preoperative exam to assess for increased cardiopulmonary risk while undergoing surgery and anesthesia.    Date of Encounter: 5/28/2025  Primary Care Physician:  Steffi Hatch     Reason for visit:   Encounter Diagnoses   Name Primary?    Preop examination Yes    Coronary artery disease involving native coronary artery of native heart without angina pectoris        HPI  Cesar Rashid is a 74 year old male who presents for pre-operative H & P in preparation for  Procedure Information       Case: [id number] Date/Time: 06/02/25 0730    Procedure: CORONARY ARTERY BYPASS GRAFT AND ANY ASSOCIATED PROCEDURES (Chest)    Anesthesia type: General    Diagnosis: CAD (coronary artery disease) [I25.10]    Pre-op diagnosis: CAD (coronary artery disease) [I25.10]    Location:  OR [address] [place] [address] OR    Providers: Ridge Huerta MD            Cesar Rashid is a 73 y.o. male who has end-stage renal disease on peritoneal dialysis who presented for coronary angiogram on 3/20/2025 as part of kidney transplant evaluation. He was found to have severe coronary artery disease and was referred to cardiothoracic surgery for CABG evaluation.     Mr. Rashid was seen in the PAC in anesthesia consultation in April of this year with with myself and Dr. Satinder Rizvi, for consideration of CABG with known anemia and is a practicing Scientology. Since then, the patient has been seen by my colleague, JIAN Harvey, for a preop H&P.  The above surgery has been delayed in an attempt to optimize patient's anemia for CABG.  In the interim, the patient has met with hematology and has continued to get iron infusions as well as receiving EPO through his nephrologist.      The patient presents to the PAC virtually today with his wife, Rafaela, with comorbid conditions including h/o VF arrest in setting of acute STEMI of inferior wall with subsequent ICD placement, ICM, HTN, HL, ANCA-associated vasculitis,  bladder cancer, ESRD on PD, pulmonary nodules, anemia in CKD and h/o b/l ANALISA.      Please note, when patient had his STEMI with VF arrest (9/2023), he was treated with shockwave lithotripsy and SURESH to RCA/RPLA. At that time, his LVEF of 45%. During his hospital stay, he had an in-hospital VT arrest. Repeat coronary angiogram was completed that showed no evidence of In-Stent restenosis. Patient underwent ICD placement.      Patient is a practicing Confucianism and declines blood transfusion.     History is obtained from the patient and chart review    Hx of abnormal bleeding or anti-platelet use: ASA      Past Medical History  Past Medical History:   Diagnosis Date    Acute ST elevation myocardial infarction (H)     ANCA-associated vasculitis (H)     Bladder cancer (H)     CAD (coronary artery disease)     Dyslipidemia     End stage renal disease (H)     HTN (hypertension)     ELBERT (iron deficiency anemia)     Ischemic cardiomyopathy     Malignant neoplasm of lateral wall of urinary bladder (H)     Pulmonary nodules     Transfusion of blood product refused for Holiness reason     Jehova's witness       Past Surgical History  Past Surgical History:   Procedure Laterality Date    CV CORONARY ANGIOGRAM N/A 3/20/2025    Procedure: Coronary Angiogram;  Surgeon: Pepito Helton MD;  Location:  [facility]    repaired ruptured globe Right     TOTAL HIP ARTHROPLASTY Left 04/29/2014    TOTAL HIP ARTHROPLASTY Right 09/02/2014    VASCULAR SURGERY      chest port, peritoneal dialysis catheter       Prior to Admission Medications  Current Outpatient Medications   Medication Sig Dispense Refill    aspirin (ASA) 81 MG chewable tablet Take 81 mg by mouth at bedtime.      carvedilol (COREG) 6.25 MG tablet Take 6.25 mg by mouth 2 times daily (with meals)      Coenzyme Q10 (COQ10 PO) Take by mouth every morning.      levothyroxine (SYNTHROID/LEVOTHROID) 200 MCG tablet Take 1 tablet by mouth every morning  (before breakfast).      lisinopril (ZESTRIL) 10 MG tablet Take 10 mg by mouth at bedtime.      multivitamin w/minerals (MULTI-VITAMIN) tablet Take 1 tablet by mouth daily.      Naltrexone HCl, Pain, 4.5 MG CAPS Take 4.5 mg by mouth at bedtime.      Omega-3 Fatty Acids (OMEGA-3 FISH OIL PO) Take 1 g by mouth daily.      rosuvastatin (CRESTOR) 10 MG tablet Take 10 mg by mouth at bedtime.      sevelamer HCl (RENAGEL) 800 MG tablet Take 800 mg by mouth 3 times daily (with meals)      UNABLE TO FIND Inject 120 mcg subcutaneously every 30 days. MEDICATION NAME: Micera is given monthly as needed based on patient's Hgb results.Last dose End April 2025      UNABLE TO FIND Take 3 tablets by mouth 2 times daily. MEDICATION NAME: [employer]ogenics supplement      vitamin E 400 units TABS Take 400 Units by mouth every morning.      nitroGLYcerin (NITROSTAT) 0.4 MG sublingual tablet Place 0.4 mg under the tongue every 5 minutes as needed.      UNABLE TO FIND Take 2 capsules by mouth 2 times daily. MEDICATION NAME: arterosil         Allergies  Allergies   Allergen Reactions    Atorvastatin Muscle Pain (Myalgia)     Myalgias (Muscle pain)    Levofloxacin Other (See Comments)    Omeprazole Nausea and Vomiting     Patient denied - no intolerance or allergy to this    Amlodipine Palpitations       Social History  Social History     Socioeconomic History    Marital status:      Spouse name: Not on file    Number of children: Not on file    Years of education: Not on file    Highest education level: Not on file   Occupational History    Not on file   Tobacco Use    Smoking status: Never     Passive exposure: Never    Smokeless tobacco: Never   Substance and Sexual Activity    Alcohol use: Not Currently     Comment: Very little - maybe 2 drinks a year    Drug use: Never    Sexual activity: Not on file   Other Topics Concern    Parent/sibling w/ CABG, MI or angioplasty before 65F 55M? No   Social History Narrative    Not on file      Social Drivers of Health     Financial Resource Strain: Not on File (2024)    Received from [employer]    Financial Resource Strain     Financial Resource Strain: 0   Food Insecurity: Not on File (2024)    Received from [employer]    Food Insecurity     Food: 0   Transportation Needs: Not on File (2024)    Received from [employer]    Transportation Needs     Transportation: 0   Physical Activity: Not on File (2024)    Received from [employer]    Physical Activity     Physical Activity: 0   Stress: Not on File (2024)    Received from [employer]    Stress     Stress: 0   Social Connections: Not on File (2024)    Received from [employer]    Social Connections     Connectedness: 0   Interpersonal Safety: Not on file   Housing Stability: Not on File (2024)    Received from [employer]    Housing Stability     Housin       Family History  Family History   Problem Relation Age of Onset    Colon Cancer Mother 80    Coronary Artery Disease Mother     Diabetes Mother     Coronary Artery Disease Father     Thyroid Disease Father     Alcoholism Brother     Diabetes Brother     Anesthesia Reaction No family hx of        Review of Systems  The complete review of systems is negative other than noted in the HPI or here.   Anesthesia Evaluation   Pt has had prior anesthetic. Type: General and MAC.    No history of anesthetic complications       ROS/MED HX  ENT/Pulmonary: Comment: Pulmonary nodule>>followed with imaging.    (+)     YUSEF risk factors,  hypertension,                              (-) tobacco use and recent URI   Neurologic:    (-) no seizures and no CVA   Cardiovascular: Comment: STEMI with VF arrest (2023) treated with shockwave lithotripsy and SURESH to RCA/RPLA.  During his hospital stay he had an in-hospital VT arrest.  Repeat coronary angiogram was completed showing no evidence of in-stent restenosis    Status post ICD placement    More recent referral to CT surgery for multivessel coronary artery disease    (+)  Dyslipidemia hypertension-range: Recent blood pressure at home 124-130/60-70/ -  CAD - past MI - stent-   Taking blood thinners Pt has not received instructions: Instructions Given to patient: Patient reports surgeon asked him to hold ASA for 5 days prior to surgery. CHF etiology: ICM Last EF: 55-60% date: 7/29/24          ICD Reason placed:cardiac arrest.  type;[employer] Settings VVI 40  dysrhythmias, Other,        Previous cardiac testing   Echo: Date: 7/29/24 Results:  Echocardiogram with two-dimensional, color and spectral Doppler performed.  ______________________________________________________________________________  Interpretation Summary  Global and regional left ventricular function is normal with an EF of 55-60%.  Global right ventricular function is normal. The right ventricle is normal  size.  No significant valvular abnormalities present.  There is mild dilation at the level of the ascending aorta (4.2 cm, indexed  value 2.21 cm/m2).  IVC diameter <2.1 cm collapsing >50% with sniff suggests a normal RA pressure  of 3 mmHg.  There is no prior study for direct comparison.    Stress Test:  Date: Results:    ECG Reviewed:  Date: 5/28/25 Results:  Sinus rhythm   Inferior infarct (cited on or before 29-Jul-2024)   Abnormal ECG   When compared with ECG of 01-May-2025 14:44,   No significant change was foun       Cath:  Date: 3/20/25 Results:    Conclusion          Ost LM to Mid LM lesion is 40% stenosed.     Ost LAD to Prox LAD lesion is 40% stenosed.     Prox LAD to Mid LAD lesion is 70% stenosed.     Mid LAD lesion is 70% stenosed.     1st Diag lesion is 50% stenosed.     Prox Cx lesion is 80% stenosed.     1st Mrg lesion is 40% stenosed.     Mid Cx to Dist Cx lesion is 70% stenosed.     Prox RCA lesion is 70% stenosed.     Prox RCA to Mid RCA lesion is 100% stenosed.     1. Moderate lesion of the proximal left main coronary artery.  2. Moderate diffuse disease of the left anterior descending  artery. There are severe tandem 70% obstructive lesions distal to the takeoff of the first diagonal.  3. There is an 80% obstructive lesion of the proximal left circumflex artery as well as a 70% obstructive lesion distal to the takeoff of OM2.  4. There is a focal 70% obstructive lesion of the proximal right coronary artery. The previously placed drug-eluting stent has a 100% chronic in-stent restenosis. The distal vessel fills via R-R and L-R collaterals.  5. Successful uncomplicated right radial arterial access with hemostasis by TR band placement.        Plan      Follow bedrest per protocol    Continued medical management and lifestyle modifications for cardiovascular risk factor optimizations.    Discharge today per protocol        Referral to cardiothoracic surgery for CABG evaluation.     (-) HERMOSILLO   METS/Exercise Tolerance: 4 - Raking leaves, gardening Comment: Used leaf blower yesterday clearing yard.   Denies chest pain, irregular heart rate or dyspnea on exertion   Hematologic: Comments: Zoroastrianism  Anemia in chronic kidney disease    Beta 2 glycoprotein 1 antibody positive  Note from [facility] 1/9/25 for DVT prophylaxis     (+)      anemia,       (-) history of blood clots and history of blood transfusion   Musculoskeletal: Comment: Bilateral ANALISA  (+)  arthritis,             GI/Hepatic: Comment: Intermittent abd pain, N/V, attributed to his kidney disease   (-) GERD   Renal/Genitourinary: Comment: ESRD likely secondary to MPO ANCA vasculitis diagnosed in 2019  Reports he receives Mircera typically monthly based on his hemoglobin, last late April  Undergoing transplant nephrology workup  Treated with naltrexone for vasculitis per patient    (+) renal disease, type: ESRD, Pt requires dialysis, type: Peritoneal dialysis,       (-) History of transplant   Endo:     (+)          thyroid problem, hypothyroidism,        (-) Type II DM   Psychiatric/Substance Use: Comment: Patient is on naltrexone for  "vasculitis. Will hold for 3 days prior to surgery   (-) psychiatric history   Infectious Disease:    (-) Recent Fever   Malignancy:   (+) Malignancy, History of Other.Other CA Bladder-TURBT and BCG status post Surgery and Chemo.    Other:  - neg other ROS          /77 (BP Location: Right arm, Patient Position: Sitting, Cuff Size: Adult Large)   Pulse 67   Temp 98.3  F (36.8  C) (Oral)   Resp 16   Ht 1.753 m (5' 9\")   Wt 73.9 kg (163 lb)   SpO2 100%   BMI 24.07 kg/m      Physical Exam   Constitutional: Awake, alert, cooperative, no apparent distress, and appears stated age.  Eyes: Pupils equal, round and reactive to light, extra ocular muscles intact, sclera clear, conjunctiva normal.  HENT: Normocephalic, oral pharynx with moist mucus membranes, good dentition. No goiter appreciated.   Respiratory: Clear to auscultation bilaterally, no crackles or wheezing.  Cardiovascular: Regular rate and rhythm, normal S1 and S2, and no murmur noted.  Carotids +2, no bruits. No edema. Palpable pulses to radial  DP and PT arteries.   GI: Normal bowel sounds, soft, non-distended, non-tender, palpable peritoneal dialysis access, no hepatosplenomegaly.   Lymph/Hematologic: No cervical lymphadenopathy and no supraclavicular lymphadenopathy.  Skin: Warm and dry.    Musculoskeletal: Limited neck extension. There is no redness, warmth, or swelling of the joints. Gross motor strength is normal.    Neurologic: Awake, alert, oriented to name, place and time. Cranial nerves II-XII are grossly intact. Gait is normal.   Neuropsychiatric: Calm, cooperative. Normal affect.     Prior Labs/Diagnostic Studies   All labs and imaging pertinent to the visit personally reviewed      Latest Reference Range & Units 04/29/25 15:30   Sodium 135 - 145 mmol/L 136   Potassium 3.4 - 5.3 mmol/L 4.9   Chloride 98 - 107 mmol/L 93 (L)   Carbon Dioxide (CO2) 22 - 29 mmol/L 32 (H)   Urea Nitrogen 8.0 - 23.0 mg/dL 59.0 (H)   Creatinine 0.67 - 1.17 mg/dL " 12.01 (H)   GFR Estimate >60 mL/min/1.73m2 4 (L)   Calcium 8.8 - 10.4 mg/dL 8.3 (L)   Anion Gap 7 - 15 mmol/L 11   Magnesium 1.7 - 2.3 mg/dL 3.5 (H)   Albumin 3.5 - 5.2 g/dL 3.2 (L)   Protein Total 6.4 - 8.3 g/dL 5.8 (L)   Alkaline Phosphatase 40 - 150 U/L 78   ALT 0 - 70 U/L 20   AST 0 - 45 U/L 26   Bilirubin Total <=1.2 mg/dL 0.2   Ferritin 31 - 409 ng/mL 1,174 (H)   Glucose 70 - 99 mg/dL 104 (H)   Estimated Average Glucose <117 mg/dL 103   Hemoglobin A1C 0.0 - 5.6 % 5.2   Iron 61 - 157 ug/dL 112   Iron Binding Capacity 240 - 430 ug/dL 216 (L)   Iron Sat Index 15 - 46 % 52 (H)   Vitamin B12 232 - 1,245 pg/mL 1,104   WBC 4.0 - 11.0 10e3/uL 5.4   Hemoglobin 13.3 - 17.7 g/dL 10.9 (L)   Hematocrit 40.0 - 53.0 % 32.9 (L)   Platelet Count 150 - 450 10e3/uL 273   RBC Count 4.40 - 5.90 10e6/uL 3.49 (L)   MCV 78 - 100 fL 94   MCH 26.5 - 33.0 pg 31.2   MCHC 31.5 - 36.5 g/dL 33.1   RDW 10.0 - 15.0 % 14.2   % Retic 0.5 - 2.0 % 0.9   Absolute Retic 0.025 - 0.095 10e6/uL 0.033   INR 0.85 - 1.15  1.09   PT 11.8 - 14.8 Seconds 14.0   PTT 22 - 38 Seconds 27   ABO/Rh(D)  A POS   Antibody Screen Negative  Negative   SPECIMEN EXPIRATION DATE  [id number]   (L): Data is abnormally low  (H): Data is abnormally high       PROCEDURES  Cardiac Device Check   Eval Commentary 03/27/2025   Eval Commentary Routine remote transmission for this [employer] single chamber ICD.  -Programmed: VVI 40  -Presenting rhythm: VS w/PVCs - 84bpm  -Battery Status: 12.3 years  -Lead Status: Stable lead trending, within normal limits.  No concerns.  -Heart rate trending graphs show good rate variation.  -Pacing: <0.1%   -Atrial high rates: 0  -Ventricular high rates: 0  -ICD therapies: 0 since implant  -Optivol:  Stable lung fluid status.  Plan: No device concerns, home remote check every 3 months and return to clinic 10/2025 .  Indication: Cardiac Arrest  Jolynn Loza CDS    Pertinent Information:         Please see ROS for further cardiac  testing results.   EKG/ stress test - if available please see in ROS above   Echo result w/o MOPS: Interpretation SummaryGlobal and regional left ventricular function is normal with an EF of 55-60%.Global right ventricular function is normal. The right ventricle is normalsize.No significant valvular abnormalities present.There is mild dilation at the level of the ascending aorta (4.2 cm, indexedvalue 2.21 cm/m2).IVC diameter <2.1 cm collapsing >50% with sniff suggests a normal RA pressureof 3 mmHg.There is no prior study for direct comparison.           No data to display                  The patient's records and results pertinent to the visit personally reviewed by this provider.     Outside records reviewed from: Care Everywhere    LAB/DIAGNOSTIC STUDIES TODAY:     Latest Reference Range & Units 05/28/25 10:50   Sodium 135 - 145 mmol/L 134 (L)   Potassium 3.4 - 5.3 mmol/L 3.3 (L)   Chloride 98 - 107 mmol/L 94 (L)   Carbon Dioxide (CO2) 22 - 29 mmol/L 24   Urea Nitrogen 8.0 - 23.0 mg/dL 39.4 (H)   Creatinine 0.67 - 1.17 mg/dL 10.80 (H)   GFR Estimate >60 mL/min/1.73m2 5 (L)   Calcium 8.8 - 10.4 mg/dL 9.0   Anion Gap 7 - 15 mmol/L 16 (H)   Magnesium 1.7 - 2.3 mg/dL 2.4 (H)   Albumin 3.5 - 5.2 g/dL 3.1 (L)   Protein Total 6.4 - 8.3 g/dL 6.3 (L)   Alkaline Phosphatase 40 - 150 U/L 96   ALT 0 - 70 U/L 16   AST 0 - 45 U/L 24   Bilirubin Total <=1.2 mg/dL 0.3   Glucose 70 - 99 mg/dL 97   Iron 61 - 157 ug/dL 47 (L)   Iron Binding Capacity 240 - 430 ug/dL 167 (L)   Iron Sat Index 15 - 46 % 28   WBC 4.0 - 11.0 10e3/uL 6.9   Hemoglobin 13.3 - 17.7 g/dL 13.0 (L)   Hematocrit 40.0 - 53.0 % 38.9 (L)   Platelet Count 150 - 450 10e3/uL 319   RBC Count 4.40 - 5.90 10e6/uL 4.19 (L)   MCV 78 - 100 fL 93   MCH 26.5 - 33.0 pg 31.0   MCHC 31.5 - 36.5 g/dL 33.4   RDW 10.0 - 15.0 % 16.6 (H)   % Retic 0.5 - 2.0 % 3.8 (H)   Absolute Retic 0.025 - 0.095 10e6/uL 0.160 (H)   INR 0.85 - 1.15  1.03   PT 11.8 - 14.8 Seconds 13.8   PTT 22 - 38  Seconds 31   ABO/Rh(D)  A POS   Antibody Screen Negative  Negative   SPECIMEN EXPIRATION DATE  5/31/2025 11:59:00 PM CDT   (L): Data is abnormally low  (H): Data is abnormally high    Assessment    Cesar Rashid is a 74 year old male seen as a PAC referral for risk assessment and optimization for anesthesia.    Plan/Recommendations  Pt will be optimized for the proposed procedure.  See below for details on the assessment, risk, and preoperative recommendations    NEUROLOGY  - No history of TIA, CVA or seizure    -Post Op delirium risk factors:  High co-morbid index    ENT  - Airway:  patient has limited neck extension    Glidescope used in 2020 anesthesia.    Please see anesthesia records for further details  Mallampati: II  TM: > 3    Denies swallowing difficulty      CARDIAC  - Known multivessel CAD on recent angiogram in setting of chronic anemia in patient with ESRD and Alevism.    ~ Anesthesia consultation on 4/14/25 for optimizing anemia pre-operatively  ~Treating with  Iron infusions and EPO      - STEMI with VF arrest (9/2023) treated with shockwave lithotripsy and SURESH to RCA/RPLA,  ischemic cardiomyopathy with LVEF of 45%. During his hospital stay, he had an in-hospital VT arrest. Repeat coronary angiogram was completed that showed no evidence of In-Stent restenosis. Patient is s/p ICD placement.               ~ denies cardiac symptoms    ~ device check above from March 2025              ~ ASA and Statin     -  ischemic cardiomyopathy with recovery of LVEF 60% (1/2025)  S/p ICD placement and medical management              ~ Coreg and lisinopril    ~ continue coreg.     ~ hold lisinopril for 48 hours prior     - ventricular tachycardia   S/p ICD placement     - METS (Metabolic Equivalents)  Patient performs 4 or more METS exercise without symptoms             Total Score: 0      PULMONARY  - YUSEF Medium Risk             Total Score: 3    YUSEF: Hypertension    YUSEF: Over 50 ys old    YUSEF: Male  "       - Pulmonary nodules  Sub 6-mm and benign appearing on 9/2024 CT chest.   Followed with serial imaging     - Denies asthma or inhaler use    - Tobacco History    History   Smoking Status    Never   Smokeless Tobacco    Never       GI  - Denies h/o GERD    - Endorses intermittent  GI upset with associated nausea   ~ Typically occurs after taking Sevelamer   ~ Encouraged to connect with nephrology     - PONV Medium Risk  Total Score: 2           1 AN PONV: Patient is not a current smoker    1 AN PONV: Intended Post Op Opioids        /RENAL  - ESRD likely secondary to MPO ANCA vasculitis dx 2019  Peritoneal dialysis  Reports he receives Mircera typically monthly based on his Hemoglobin  Undergoing transplant nephrology work up   Treated with Natrexone for vasculitis per patient               ~ Reports he has been holding his Naltrexone with las dose 5/26/25 in preparation for above surgery.       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       ENDOCRINE    - BMI: Estimated body mass index is 24.07 kg/m  as calculated from the following:    Height as of this encounter: 1.753 m (5' 9\").    Weight as of this encounter: 73.9 kg (163 lb).  Healthy Weight (BMI 18.5-24.9)    - No history of Diabetes Mellitus    - Hypothyroidism, take Levothyroxine DOS.      HEME  - Anemia in patient who is a practicing Adventism seen by Dr. Haque in heme/onc for optimization prior to surgery.  Per Robi Haque and Jack's 5/6/25 note:    Recommendations:  Discussed with the patient about optimization of Hb prior to surgery with EPO.   Discussed the case with patient's nephrologist Dr. Loreta Valdivia who will double the dose as compared to prior EPO doses to aim for Hb in the range of 11-12 or higher.   Recommend initiation of EPO at 10,000 daily post operative till Hb > 10.   Patient understood the management decisions.      Case was discussed with Dr. Haque who was in agreement with the plan.    "   Oumar Santiago MD    -  seropositivity for ALIA IgG and B2GPI IgG without a history of venous thrombosis  No history of clotting  Per Dr. Haque's note from 1/9/2025 for DVT prophylaxis:  recommend standard of care postoperative DVT prophylaxis.      - VTE Low Risk 0.5%             Total Score: 2    VTE: Male      - Platelet dysfunction second to Aspirin (Jesenia, many others)  Reports the last dose of ASA was on 5/26/25.  Directions per [facility].     - Anemia in ESRD  - A type and screen has not been ordered for above procedure due to:   Patient is a practicing Orthodox  Patient will not accept blood transfusions but will accept cell saver.   Receives Mircera as needed ~30 days based on his Hgb.    ~ patient has been receiving iron infusions since being seen in the PAC in April 2025 and EPO through his nephrologist     Hemoglobin   Date Value Ref Range Status   05/28/2025 13.0 (L) 13.3 - 17.7 g/dL Final   ]        MSK  - Osteoarthritis s/p b/l ANALISA       Patient is NOT Frail             Total Score: 0        Different anesthesia methods/types have been discussed with the patient, but they are aware that the final plan will be decided by the assigned anesthesia provider on the date of service.  Patient was discussed with Dr Deras and Dr. Rizvi    The patient is optimized for their procedure. AVS with information on surgery time/arrival time, meds and NPO status given by nursing staff. No further diagnostic testing indicated.        60 minutes were spent on the date of the encounter performing chart review, history and exam, documentation and/or discussion with other providers about the issues documented above.    MARY ELLEN Lugo CNP  Preoperative Assessment Center  [facility]  [facility]  Phone: [phone]  Fax: [phone]

## 2025-05-31 ENCOUNTER — INFUSION THERAPY VISIT (OUTPATIENT)
Dept: INFUSION THERAPY | Facility: CLINIC | Age: 75
End: 2025-05-31
Attending: CLINICAL NURSE SPECIALIST
Payer: COMMERCIAL

## 2025-05-31 VITALS
HEART RATE: 62 BPM | DIASTOLIC BLOOD PRESSURE: 76 MMHG | TEMPERATURE: 97.7 F | OXYGEN SATURATION: 100 % | SYSTOLIC BLOOD PRESSURE: 117 MMHG

## 2025-05-31 DIAGNOSIS — D50.8 IRON DEFICIENCY ANEMIA SECONDARY TO INADEQUATE DIETARY IRON INTAKE: Primary | ICD-10-CM

## 2025-05-31 DIAGNOSIS — N18.6 END STAGE RENAL DISEASE (H): ICD-10-CM

## 2025-05-31 LAB
IRON BINDING CAPACITY (ROCHE): 136 UG/DL (ref 240–430)
IRON SATN MFR SERPL: 28 % (ref 15–46)
IRON SERPL-MCNC: 38 UG/DL (ref 61–157)
RETICS # AUTO: 0.18 10E6/UL (ref 0.03–0.1)
RETICS/RBC NFR AUTO: 4.5 % (ref 0.5–2)

## 2025-05-31 PROCEDURE — 258N000003 HC RX IP 258 OP 636: Performed by: CLINICAL NURSE SPECIALIST

## 2025-05-31 PROCEDURE — 85045 AUTOMATED RETICULOCYTE COUNT: CPT | Performed by: CLINICAL NURSE SPECIALIST

## 2025-05-31 PROCEDURE — 96366 THER/PROPH/DIAG IV INF ADDON: CPT

## 2025-05-31 PROCEDURE — 96365 THER/PROPH/DIAG IV INF INIT: CPT

## 2025-05-31 PROCEDURE — 250N000011 HC RX IP 250 OP 636: Performed by: CLINICAL NURSE SPECIALIST

## 2025-05-31 PROCEDURE — 83550 IRON BINDING TEST: CPT | Performed by: CLINICAL NURSE SPECIALIST

## 2025-05-31 PROCEDURE — 36415 COLL VENOUS BLD VENIPUNCTURE: CPT | Performed by: CLINICAL NURSE SPECIALIST

## 2025-05-31 RX ORDER — ALBUTEROL SULFATE 0.83 MG/ML
2.5 SOLUTION RESPIRATORY (INHALATION)
OUTPATIENT
Start: 2025-06-01

## 2025-05-31 RX ORDER — DIPHENHYDRAMINE HYDROCHLORIDE 50 MG/ML
25 INJECTION, SOLUTION INTRAMUSCULAR; INTRAVENOUS
Start: 2025-06-01

## 2025-05-31 RX ORDER — HEPARIN SODIUM (PORCINE) LOCK FLUSH IV SOLN 100 UNIT/ML 100 UNIT/ML
5 SOLUTION INTRAVENOUS
OUTPATIENT
Start: 2025-06-01

## 2025-05-31 RX ORDER — HEPARIN SODIUM,PORCINE 10 UNIT/ML
5-20 VIAL (ML) INTRAVENOUS DAILY PRN
OUTPATIENT
Start: 2025-06-01

## 2025-05-31 RX ORDER — EPINEPHRINE 1 MG/ML
0.3 INJECTION, SOLUTION INTRAMUSCULAR; SUBCUTANEOUS EVERY 5 MIN PRN
OUTPATIENT
Start: 2025-06-01

## 2025-05-31 RX ORDER — ALBUTEROL SULFATE 90 UG/1
1-2 INHALANT RESPIRATORY (INHALATION)
Start: 2025-06-01

## 2025-05-31 RX ORDER — DIPHENHYDRAMINE HYDROCHLORIDE 50 MG/ML
50 INJECTION, SOLUTION INTRAMUSCULAR; INTRAVENOUS
Start: 2025-06-01

## 2025-05-31 RX ORDER — METHYLPREDNISOLONE SODIUM SUCCINATE 40 MG/ML
40 INJECTION INTRAMUSCULAR; INTRAVENOUS
Start: 2025-06-01

## 2025-05-31 RX ORDER — MEPERIDINE HYDROCHLORIDE 25 MG/ML
25 INJECTION INTRAMUSCULAR; INTRAVENOUS; SUBCUTANEOUS
OUTPATIENT
Start: 2025-06-01

## 2025-05-31 RX ADMIN — IRON SUCROSE 300 MG: 20 INJECTION, SOLUTION INTRAVENOUS at 12:25

## 2025-05-31 ASSESSMENT — ENCOUNTER SYMPTOMS
SEIZURES: 0
DYSRHYTHMIAS: 1

## 2025-05-31 ASSESSMENT — LIFESTYLE VARIABLES: TOBACCO_USE: 0

## 2025-05-31 NOTE — PROGRESS NOTES
Nursing Note  Cesar Rashid presents [facility] for:   Chief Complaint   Patient presents with    Infusion     During today's [facility] appointment, orders from Emily Walker NP were completed.  Frequency: dose 3/3    Progress note:  Patient identification verified by name and date of birth.  Assessment completed.  Vitals recorded in Doc Flowsheets.  Patient was provided with education regarding medication/procedure and possible side effects.  Patient verbalized understanding.     present during visit today: Not Applicable.    Treatment Conditions: Labs drawn before infusion today.    Premedications: were not ordered.    Drug Waste Record: No    Infusion length and rate:  infusion given over approximately 90 minutes    Labs: were drawn per orders.     Vascular access: peripheral IV placed today.    Is the next appt scheduled? yes    Post Infusion Assessment:  Patient tolerated infusion without incident.  No evidence of extravasations.  Access discontinued per protocol.     Discharge Plan:   Follow up plan of care with: ordering provider as scheduled.  Discharge instructions were reviewed with patient.  Patient/representative verbalized understanding of discharge instructions and all questions answered.  [facility] discharged [facility] in stable condition.    Debby Paz RN    Administrations This Visit       iron sucrose (VENOFER) 300 mg in sodium chloride 0.9 % 290 mL intermittent infusion       Admin Date  05/31/2025 Action  $New Bag Dose  300 mg Rate  193.3 mL/hr Route  Intravenous Documented By  Debby Paz RN                    /76 (BP Location: Right arm, Patient Position: Semi-Burns's, Cuff Size: Adult Regular)   Pulse 62   SpO2 100%

## 2025-05-31 NOTE — PATIENT INSTRUCTIONS
Dear Cesar Rashid    Thank you for choosing [facility] ([facility] for your infusion.  The following information is a summary of our appointment as well as important reminders.      If you are a transplant patient and require transplant education, please click on this link: [email or web address].    If you have any questions on your upcoming [facility] appointments, please call scheduling at [phone].  It was a pleasure taking care of you today.    Sincerely,    [facility]  [facility]  [address] [place] [address]  [id number]  Phone:  [phone]

## 2025-06-01 NOTE — ANESTHESIA PREPROCEDURE EVALUATION
Addended by: CORA WILD on: 10/15/2019 03:08 PM     Modules accepted: Orders     Anesthesia Pre-Procedure Evaluation    Patient: Cesar Rashid   MRN: [id number] : 1950          Procedure : Procedure(s):  CORONARY ARTERY BYPASS GRAFT AND ANY ASSOCIATED PROCEDURES         Past Medical History:   Diagnosis Date    Acute ST elevation myocardial infarction (H)     ANCA-associated vasculitis (H)     Bladder cancer (H)     CAD (coronary artery disease)     Dyslipidemia     End stage renal disease (H)     HTN (hypertension)     ELBERT (iron deficiency anemia)     Ischemic cardiomyopathy     Malignant neoplasm of lateral wall of urinary bladder (H)     Pulmonary nodules     Transfusion of blood product refused for Jainism reason     Jehova's witness      Past Surgical History:   Procedure Laterality Date    CV CORONARY ANGIOGRAM N/A 3/20/2025    Procedure: Coronary Angiogram;  Surgeon: Pepito Helton MD;  Location:  [facility]    repaired ruptured globe Right     TOTAL HIP ARTHROPLASTY Left 2014    TOTAL HIP ARTHROPLASTY Right 2014    VASCULAR SURGERY      chest port, peritoneal dialysis catheter      Allergies   Allergen Reactions    Atorvastatin Muscle Pain (Myalgia)     Myalgias (Muscle pain)    Levofloxacin Other (See Comments)    Omeprazole Nausea and Vomiting     Patient denied - no intolerance or allergy to this    Amlodipine Palpitations      Social History     Tobacco Use    Smoking status: Never     Passive exposure: Never    Smokeless tobacco: Never   Substance Use Topics    Alcohol use: Not Currently     Comment: Very little - maybe 2 drinks a year      Wt Readings from Last 1 Encounters:   25 73.9 kg (163 lb)        Anesthesia Evaluation   Pt has had prior anesthetic. Type: General and MAC.    No history of anesthetic complications       ROS/MED HX  ENT/Pulmonary: Comment: Pulmonary nodule>>followed with imaging.    (+)     YUSEF risk factors,  hypertension,                              (-) tobacco use and recent URI   Neurologic:    (-)  no seizures and no CVA   Cardiovascular: Comment: STEMI with VF arrest (9/2023) treated with shockwave lithotripsy and SURESH to RCA/RPLA.  During his hospital stay he had an in-hospital VT arrest.  Repeat coronary angiogram was completed showing no evidence of in-stent restenosis    Status post ICD placement    More recent referral to CT surgery for multivessel coronary artery disease    Recovered ischemic cardiomyopathy LVEF 55-60% (7/2024)    (+) Dyslipidemia hypertension-range: Recent blood pressure at home 124-130/60-70/ -  CAD - past MI - stent-   Taking blood thinners Pt has not received instructions: Instructions Given to patient: Patient reports surgeon asked him to hold ASA for 5 days prior to surgery. CHF etiology: ICM Last EF: 55-60% date: 7/29/24          ICD Reason placed:cardiac arrest.  type;[place] DEUS Settings VVI 40  dysrhythmias, Other,        Previous cardiac testing   Echo: Date: 7/29/24 Results:  Echocardiogram with two-dimensional, color and spectral Doppler performed.  ______________________________________________________________________________  Interpretation Summary  Global and regional left ventricular function is normal with an EF of 55-60%.  Global right ventricular function is normal. The right ventricle is normal  size.  No significant valvular abnormalities present.  There is mild dilation at the level of the ascending aorta (4.2 cm, indexed  value 2.21 cm/m2).  IVC diameter <2.1 cm collapsing >50% with sniff suggests a normal RA pressure  of 3 mmHg.  There is no prior study for direct comparison.    Stress Test:  Date: Results:    ECG Reviewed:  Date: 5/28/25 Results:  Sinus rhythm   Inferior infarct (cited on or before 29-Jul-2024)   Abnormal ECG   When compared with ECG of 01-May-2025 14:44,   No significant change was foun       Cath:  Date: 3/20/25 Results:    Conclusion          Ost LM to Mid LM lesion is 40% stenosed.     Ost LAD to Prox LAD lesion is 40% stenosed.      Prox LAD to Mid LAD lesion is 70% stenosed.     Mid LAD lesion is 70% stenosed.     1st Diag lesion is 50% stenosed.     Prox Cx lesion is 80% stenosed.     1st Mrg lesion is 40% stenosed.     Mid Cx to Dist Cx lesion is 70% stenosed.     Prox RCA lesion is 70% stenosed.     Prox RCA to Mid RCA lesion is 100% stenosed.     1. Moderate lesion of the proximal left main coronary artery.  2. Moderate diffuse disease of the left anterior descending artery. There are severe tandem 70% obstructive lesions distal to the takeoff of the first diagonal.  3. There is an 80% obstructive lesion of the proximal left circumflex artery as well as a 70% obstructive lesion distal to the takeoff of OM2.  4. There is a focal 70% obstructive lesion of the proximal right coronary artery. The previously placed drug-eluting stent has a 100% chronic in-stent restenosis. The distal vessel fills via R-R and L-R collaterals.  5. Successful uncomplicated right radial arterial access with hemostasis by TR band placement.        Plan      Follow bedrest per protocol    Continued medical management and lifestyle modifications for cardiovascular risk factor optimizations.    Discharge today per protocol        Referral to cardiothoracic surgery for CABG evaluation.     (-) HERMOSILLO   METS/Exercise Tolerance: 4 - Raking leaves, gardening Comment: Used leaf blower yesterday clearing yard.   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 Will hold for 3 days prior to surgery   (-) psychiatric history   Infectious Disease:    (-) Recent Fever   Malignancy:   (+) Malignancy, History of Other.Other CA Bladder-TURBT and BCG status post Surgery and Chemo.    Other:  - neg other ROS            Physical Exam  Airway  Mallampati: II  TM distance: >3 FB  Neck ROM: limited  Upper bite lip test: I  Mouth opening: >= 4 cm    Cardiovascular   Rhythm: regular  Rate: normal rate   Comments: STEMI with VF arrest (9/2023) treated with shockwave lithotripsy and SURESH to RCA/RPLA.  During his hospital stay he had an in-hospital VT arrest.  Repeat coronary angiogram was completed showing no evidence of in-stent restenosis    Status post ICD placement    More recent referral to CT surgery for multivessel coronary artery disease    Recovered ischemic cardiomyopathy LVEF 55-60% (7/2024)  Dental   (+) Minor Abnormalities - some fillings, tiny chips      Pulmonary - normal examBreath sounds clear to auscultation        Neurological - normal exam  He appears awake, alert and oriented x3.    Other Findings       OUTSIDE LABS:  CBC:   Lab Results   Component Value Date    WBC 6.9 05/28/2025    WBC 5.4 04/29/2025    HGB 13.0 (L) 05/28/2025    HGB 11.2 (L) 05/25/2025    HCT 38.9 (L) 05/28/2025    HCT 32.9 (L) 04/29/2025     05/28/2025     04/29/2025     BMP:   Lab Results   Component Value Date     (L) 05/28/2025    NA  "136 04/29/2025    POTASSIUM 3.3 (L) 05/28/2025    POTASSIUM 4.9 04/29/2025    CHLORIDE 94 (L) 05/28/2025    CHLORIDE 93 (L) 04/29/2025    CO2 24 05/28/2025    CO2 32 (H) 04/29/2025    BUN 39.4 (H) 05/28/2025    BUN 59.0 (H) 04/29/2025    CR 10.80 (H) 05/28/2025    CR 12.01 (H) 04/29/2025    GLC 97 05/28/2025     (H) 04/29/2025     COAGS:   Lab Results   Component Value Date    PTT 31 05/28/2025    INR 1.03 05/28/2025     POC: No results found for: \"BGM\", \"HCG\", \"HCGS\"  HEPATIC:   Lab Results   Component Value Date    ALBUMIN 3.1 (L) 05/28/2025    PROTTOTAL 6.3 (L) 05/28/2025    ALT 16 05/28/2025    AST 24 05/28/2025    ALKPHOS 96 05/28/2025    BILITOTAL 0.3 05/28/2025     OTHER:   Lab Results   Component Value Date    A1C 4.9 05/28/2025    ISIDRO 9.0 05/28/2025    MAG 2.4 (H) 05/28/2025       Anesthesia Plan    ASA Status:  4      NPO Status: NPO Appropriate   Anesthesia Type: General.  Airway: oral.  Induction: intravenous.  Maintenance: Balanced.   Techniques and Equipment:     - Airway:  Planned airway equipment includes video laryngoscope.     - Monitoring Plan: standard ASA monitoring, YULIA, cerebral oximetry, arterial line kit, central line kit     Consents    Anesthesia Plan(s) and associated risks, benefits, and realistic alternatives discussed. Questions answered and patient/representative(s) expressed understanding.     - Discussed: anesthesiologist, resident     - Discussed with:         - Pt is DNR/DNI Status: no DNR     Blood Consent:      - Discussed with: patient.     Postoperative Care    Pain management: multimodal analgesia.     Comments:                   Lynsey Malik MD    I have reviewed the pertinent notes and labs in the chart from the past 30 days and (re)examined the patient.  Any updates or changes from those notes are reflected in this note.    Clinically Significant Risk Factors Present on Admission                   # Hypertension: Noted on problem list                            "

## 2025-06-02 ENCOUNTER — ANCILLARY PROCEDURE (OUTPATIENT)
Dept: CARDIOLOGY | Facility: CLINIC | Age: 75
DRG: 235 | End: 2025-06-02
Attending: SURGERY
Payer: COMMERCIAL

## 2025-06-02 ENCOUNTER — ANESTHESIA (OUTPATIENT)
Dept: SURGERY | Facility: CLINIC | Age: 75
End: 2025-06-02
Payer: COMMERCIAL

## 2025-06-02 ENCOUNTER — APPOINTMENT (OUTPATIENT)
Dept: GENERAL RADIOLOGY | Facility: CLINIC | Age: 75
DRG: 235 | End: 2025-06-02
Attending: SURGERY
Payer: COMMERCIAL

## 2025-06-02 ENCOUNTER — APPOINTMENT (OUTPATIENT)
Dept: GENERAL RADIOLOGY | Facility: CLINIC | Age: 75
End: 2025-06-02
Attending: SURGERY
Payer: COMMERCIAL

## 2025-06-02 ENCOUNTER — HOSPITAL ENCOUNTER (INPATIENT)
Facility: CLINIC | Age: 75
End: 2025-06-02
Attending: SURGERY | Admitting: SURGERY
Payer: COMMERCIAL

## 2025-06-02 DIAGNOSIS — Z95.1 S/P CABG (CORONARY ARTERY BYPASS GRAFT): Primary | ICD-10-CM

## 2025-06-02 DIAGNOSIS — C67.2 MALIGNANT NEOPLASM OF LATERAL WALL OF URINARY BLADDER (H): Primary | ICD-10-CM

## 2025-06-02 DIAGNOSIS — I15.1 HYPERTENSION SECONDARY TO OTHER RENAL DISORDERS: ICD-10-CM

## 2025-06-02 DIAGNOSIS — Z95.1 S/P CABG (CORONARY ARTERY BYPASS GRAFT): ICD-10-CM

## 2025-06-02 DIAGNOSIS — I25.810 CORONARY ARTERY DISEASE INVOLVING AUTOLOGOUS ARTERY CORONARY BYPASS GRAFT WITHOUT ANGINA PECTORIS: ICD-10-CM

## 2025-06-02 DIAGNOSIS — Z45.02 FITTING AND ADJUSTMENT OF AUTOMATIC IMPLANTABLE CARDIOVERTER-DEFIBRILLATOR: ICD-10-CM

## 2025-06-02 DIAGNOSIS — I25.10 CORONARY ARTERY DISEASE INVOLVING NATIVE CORONARY ARTERY OF NATIVE HEART WITHOUT ANGINA PECTORIS: Primary | ICD-10-CM

## 2025-06-02 LAB
ALBUMIN SERPL BCG-MCNC: 2.8 G/DL (ref 3.5–5.2)
ALLEN'S TEST: ABNORMAL
ALP SERPL-CCNC: 59 U/L (ref 40–150)
ALT SERPL W P-5'-P-CCNC: 13 U/L (ref 0–70)
ANION GAP SERPL CALCULATED.3IONS-SCNC: 15 MMOL/L (ref 7–15)
ANION GAP SERPL CALCULATED.3IONS-SCNC: 20 MMOL/L (ref 7–15)
APTT PPP: 32 SECONDS (ref 22–38)
APTT PPP: 34 SECONDS (ref 22–38)
AST SERPL W P-5'-P-CCNC: 29 U/L (ref 0–45)
BASE EXCESS BLDA CALC-SCNC: -1.9 MMOL/L (ref -3–3)
BASE EXCESS BLDA CALC-SCNC: -2.6 MMOL/L (ref -3–3)
BASE EXCESS BLDA CALC-SCNC: 0.6 MMOL/L (ref -3–3)
BASE EXCESS BLDA CALC-SCNC: 0.6 MMOL/L (ref -3–3)
BASE EXCESS BLDA CALC-SCNC: 0.7 MMOL/L (ref -3–3)
BASE EXCESS BLDA CALC-SCNC: 1.7 MMOL/L (ref -3–3)
BASE EXCESS BLDA CALC-SCNC: 2.3 MMOL/L (ref -3–3)
BASE EXCESS BLDA CALC-SCNC: 2.7 MMOL/L (ref -3–3)
BASE EXCESS BLDA CALC-SCNC: 3.2 MMOL/L (ref -3–3)
BASE EXCESS BLDA CALC-SCNC: 3.8 MMOL/L (ref -3–3)
BASE EXCESS BLDA CALC-SCNC: 4.2 MMOL/L (ref -3–3)
BASE EXCESS BLDV CALC-SCNC: 0.9 MMOL/L (ref -3–3)
BILIRUB SERPL-MCNC: 0.5 MG/DL
BUN SERPL-MCNC: 34.6 MG/DL (ref 8–23)
BUN SERPL-MCNC: 36.5 MG/DL (ref 8–23)
CA-I BLD-MCNC: 3.8 MG/DL (ref 4.4–5.2)
CA-I BLD-MCNC: 3.8 MG/DL (ref 4.4–5.2)
CA-I BLD-MCNC: 3.9 MG/DL (ref 4.4–5.2)
CA-I BLD-MCNC: 3.9 MG/DL (ref 4.4–5.2)
CA-I BLD-MCNC: 4 MG/DL (ref 4.4–5.2)
CA-I BLD-MCNC: 4.2 MG/DL (ref 4.4–5.2)
CA-I BLD-MCNC: 4.2 MG/DL (ref 4.4–5.2)
CA-I BLD-MCNC: 4.4 MG/DL (ref 4.4–5.2)
CA-I BLD-MCNC: 4.8 MG/DL (ref 4.4–5.2)
CALCIUM SERPL-MCNC: 9.1 MG/DL (ref 8.8–10.4)
CALCIUM SERPL-MCNC: 9.1 MG/DL (ref 8.8–10.4)
CHLORIDE SERPL-SCNC: 96 MMOL/L (ref 98–107)
CHLORIDE SERPL-SCNC: 97 MMOL/L (ref 98–107)
CLOT INIT KAOL IND TO POST HEP NEUT TRTO: 0.9 {RATIO}
CLOT INIT KAOL IND TO POST HEP NEUT TRTO: 1.1 {RATIO}
CLOT INIT KAOLIN IND BLD US: 119 SEC (ref 113–166)
CLOT INIT KAOLIN IND BLD US: 167 SEC (ref 113–166)
CLOT INIT KAOLIN IND P HEP NEUT BLD US: 128 SEC (ref 103–153)
CLOT INIT KAOLIN IND P HEP NEUT BLD US: 156 SEC (ref 103–153)
CLOT STIFF PLT CONT BLD CALC: 11.4 HPA (ref 11.9–29.8)
CLOT STIFF PLT CONT BLD CALC: 32.7 HPA (ref 11.9–29.8)
CLOT STIFF TF IND P HEP NEUT BLD US: 15.6 HPA (ref 13–33.2)
CLOT STIFF TF IND P HEP NEUT BLD US: 39.9 HPA (ref 13–33.2)
CLOT STIFF TF IND+IIB-IIIA INH P HEP NEU: 4.2 HPA (ref 1–3.7)
CLOT STIFF TF IND+IIB-IIIA INH P HEP NEU: 7.2 HPA (ref 1–3.7)
CREAT SERPL-MCNC: 9.24 MG/DL (ref 0.67–1.17)
CREAT SERPL-MCNC: 9.38 MG/DL (ref 0.67–1.17)
EGFRCR SERPLBLD CKD-EPI 2021: 5 ML/MIN/1.73M2
EGFRCR SERPLBLD CKD-EPI 2021: 6 ML/MIN/1.73M2
ERYTHROCYTE [DISTWIDTH] IN BLOOD BY AUTOMATED COUNT: 16.5 % (ref 10–15)
ERYTHROCYTE [DISTWIDTH] IN BLOOD BY AUTOMATED COUNT: 16.5 % (ref 10–15)
ERYTHROCYTE [DISTWIDTH] IN BLOOD BY AUTOMATED COUNT: 16.9 % (ref 10–15)
FIBRINOGEN PPP-MCNC: 375 MG/DL (ref 170–510)
GLUCOSE BLD-MCNC: 105 MG/DL (ref 70–99)
GLUCOSE BLD-MCNC: 105 MG/DL (ref 70–99)
GLUCOSE BLD-MCNC: 107 MG/DL (ref 70–99)
GLUCOSE BLD-MCNC: 111 MG/DL (ref 70–99)
GLUCOSE BLD-MCNC: 117 MG/DL (ref 70–99)
GLUCOSE BLD-MCNC: 120 MG/DL (ref 70–99)
GLUCOSE BLD-MCNC: 127 MG/DL (ref 70–99)
GLUCOSE BLD-MCNC: 91 MG/DL (ref 70–99)
GLUCOSE BLDC GLUCOMTR-MCNC: 117 MG/DL (ref 70–99)
GLUCOSE BLDC GLUCOMTR-MCNC: 122 MG/DL (ref 70–99)
GLUCOSE BLDC GLUCOMTR-MCNC: 131 MG/DL (ref 70–99)
GLUCOSE BLDC GLUCOMTR-MCNC: 133 MG/DL (ref 70–99)
GLUCOSE BLDC GLUCOMTR-MCNC: 135 MG/DL (ref 70–99)
GLUCOSE BLDC GLUCOMTR-MCNC: 152 MG/DL (ref 70–99)
GLUCOSE BLDC GLUCOMTR-MCNC: 172 MG/DL (ref 70–99)
GLUCOSE BLDC GLUCOMTR-MCNC: 86 MG/DL (ref 70–99)
GLUCOSE SERPL-MCNC: 114 MG/DL (ref 70–99)
GLUCOSE SERPL-MCNC: 159 MG/DL (ref 70–99)
HCO3 BLD-SCNC: 23 MMOL/L (ref 21–28)
HCO3 BLD-SCNC: 23 MMOL/L (ref 21–28)
HCO3 BLD-SCNC: 26 MMOL/L (ref 21–28)
HCO3 BLDA-SCNC: 25 MMOL/L (ref 21–28)
HCO3 BLDA-SCNC: 26 MMOL/L (ref 21–28)
HCO3 BLDA-SCNC: 26 MMOL/L (ref 21–28)
HCO3 BLDA-SCNC: 27 MMOL/L (ref 21–28)
HCO3 BLDA-SCNC: 27 MMOL/L (ref 21–28)
HCO3 BLDA-SCNC: 28 MMOL/L (ref 21–28)
HCO3 BLDA-SCNC: 28 MMOL/L (ref 21–28)
HCO3 BLDA-SCNC: 29 MMOL/L (ref 21–28)
HCO3 BLDV-SCNC: 27 MMOL/L (ref 21–28)
HCO3 SERPL-SCNC: 19 MMOL/L (ref 22–29)
HCO3 SERPL-SCNC: 23 MMOL/L (ref 22–29)
HCT VFR BLD AUTO: 26.8 % (ref 40–53)
HCT VFR BLD AUTO: 28.9 % (ref 40–53)
HCT VFR BLD AUTO: 32.7 % (ref 40–53)
HGB BLD-MCNC: 10.9 G/DL (ref 13.3–17.7)
HGB BLD-MCNC: 10.9 G/DL (ref 13.3–17.7)
HGB BLD-MCNC: 12.2 G/DL (ref 13.3–17.7)
HGB BLD-MCNC: 8.2 G/DL (ref 13.3–17.7)
HGB BLD-MCNC: 8.3 G/DL (ref 13.3–17.7)
HGB BLD-MCNC: 8.5 G/DL (ref 13.3–17.7)
HGB BLD-MCNC: 8.7 G/DL (ref 13.3–17.7)
HGB BLD-MCNC: 9 G/DL (ref 13.3–17.7)
HGB BLD-MCNC: 9 G/DL (ref 13.3–17.7)
HGB BLD-MCNC: 9.4 G/DL (ref 13.3–17.7)
HGB BLD-MCNC: 9.7 G/DL (ref 13.3–17.7)
INR PPP: 1.56 (ref 0.85–1.15)
INR PPP: 1.76 (ref 0.85–1.15)
LACTATE BLD-SCNC: 0.5 MMOL/L (ref 0.7–2)
LACTATE BLD-SCNC: 0.7 MMOL/L (ref 0.7–2)
LACTATE BLD-SCNC: 0.8 MMOL/L (ref 0.7–2)
LACTATE BLD-SCNC: 0.9 MMOL/L (ref 0.7–2)
LACTATE SERPL-SCNC: 0.9 MMOL/L (ref 0.7–2)
LACTATE SERPL-SCNC: 1.5 MMOL/L (ref 0.7–2)
MAGNESIUM SERPL-MCNC: 3 MG/DL (ref 1.7–2.3)
MAGNESIUM SERPL-MCNC: 3.3 MG/DL (ref 1.7–2.3)
MCH RBC QN AUTO: 30.8 PG (ref 26.5–33)
MCH RBC QN AUTO: 31.1 PG (ref 26.5–33)
MCH RBC QN AUTO: 31.5 PG (ref 26.5–33)
MCHC RBC AUTO-ENTMCNC: 32.5 G/DL (ref 31.5–36.5)
MCHC RBC AUTO-ENTMCNC: 32.5 G/DL (ref 31.5–36.5)
MCHC RBC AUTO-ENTMCNC: 33.3 G/DL (ref 31.5–36.5)
MCV RBC AUTO: 93 FL (ref 78–100)
MCV RBC AUTO: 95 FL (ref 78–100)
MCV RBC AUTO: 97 FL (ref 78–100)
O2/TOTAL GAS SETTING VFR VENT: 100 %
O2/TOTAL GAS SETTING VFR VENT: 2 %
O2/TOTAL GAS SETTING VFR VENT: 30 %
O2/TOTAL GAS SETTING VFR VENT: 50 %
O2/TOTAL GAS SETTING VFR VENT: 50 %
O2/TOTAL GAS SETTING VFR VENT: 59 %
O2/TOTAL GAS SETTING VFR VENT: 70 %
O2/TOTAL GAS SETTING VFR VENT: 75 %
O2/TOTAL GAS SETTING VFR VENT: 80 %
O2/TOTAL GAS SETTING VFR VENT: 80 %
OXYHGB MFR BLDA: 77 % (ref 92–100)
OXYHGB MFR BLDA: 98 % (ref 92–100)
OXYHGB MFR BLDA: 99 % (ref 92–100)
OXYHGB MFR BLDV: 74 % (ref 70–75)
PCO2 BLD: 37 MM HG (ref 35–45)
PCO2 BLD: 39 MM HG (ref 35–45)
PCO2 BLD: 41 MM HG (ref 35–45)
PCO2 BLDA: 35 MM HG (ref 35–45)
PCO2 BLDA: 36 MM HG (ref 35–45)
PCO2 BLDA: 41 MM HG (ref 35–45)
PCO2 BLDA: 42 MM HG (ref 35–45)
PCO2 BLDA: 42 MM HG (ref 35–45)
PCO2 BLDA: 46 MM HG (ref 35–45)
PCO2 BLDV: 47 MM HG (ref 40–50)
PEEP: 5 CM H2O
PH BLD: 7.37 [PH] (ref 7.35–7.45)
PH BLD: 7.39 [PH] (ref 7.35–7.45)
PH BLD: 7.4 [PH] (ref 7.35–7.45)
PH BLDA: 7.41 [PH] (ref 7.35–7.45)
PH BLDA: 7.41 [PH] (ref 7.35–7.45)
PH BLDA: 7.42 [PH] (ref 7.35–7.45)
PH BLDA: 7.44 [PH] (ref 7.35–7.45)
PH BLDA: 7.44 [PH] (ref 7.35–7.45)
PH BLDA: 7.5 [PH] (ref 7.35–7.45)
PH BLDV: 7.36 [PH] (ref 7.32–7.43)
PHOSPHATE SERPL-MCNC: 4.2 MG/DL (ref 2.5–4.5)
PHOSPHATE SERPL-MCNC: 4.8 MG/DL (ref 2.5–4.5)
PLAT MORPH BLD: NORMAL
PLATELET # BLD AUTO: 158 10E3/UL (ref 150–450)
PLATELET # BLD AUTO: 224 10E3/UL (ref 150–450)
PLATELET # BLD AUTO: 65 10E3/UL (ref 150–450)
PO2 BLD: 140 MM HG (ref 80–105)
PO2 BLD: 141 MM HG (ref 80–105)
PO2 BLD: 220 MM HG (ref 80–105)
PO2 BLDA: 198 MM HG (ref 80–105)
PO2 BLDA: 286 MM HG (ref 80–105)
PO2 BLDA: 299 MM HG (ref 80–105)
PO2 BLDA: 303 MM HG (ref 80–105)
PO2 BLDA: 403 MM HG (ref 80–105)
PO2 BLDA: 44 MM HG (ref 80–105)
PO2 BLDA: 493 MM HG (ref 80–105)
PO2 BLDA: 512 MM HG (ref 80–105)
PO2 BLDV: 44 MM HG (ref 25–47)
POTASSIUM BLD-SCNC: 3.4 MMOL/L (ref 3.4–5.3)
POTASSIUM BLD-SCNC: 3.5 MMOL/L (ref 3.4–5.3)
POTASSIUM BLD-SCNC: 4.3 MMOL/L (ref 3.4–5.3)
POTASSIUM BLD-SCNC: 4.6 MMOL/L (ref 3.4–5.3)
POTASSIUM BLD-SCNC: 4.6 MMOL/L (ref 3.4–5.3)
POTASSIUM SERPL-SCNC: 4.3 MMOL/L (ref 3.4–5.3)
POTASSIUM SERPL-SCNC: 4.3 MMOL/L (ref 3.4–5.3)
PROT SERPL-MCNC: 4.6 G/DL (ref 6.4–8.3)
PROTHROMBIN TIME: 18.9 SECONDS (ref 11.8–14.8)
PROTHROMBIN TIME: 20.3 SECONDS (ref 11.8–14.8)
RBC # BLD AUTO: 2.76 10E6/UL (ref 4.4–5.9)
RBC # BLD AUTO: 3.05 10E6/UL (ref 4.4–5.9)
RBC # BLD AUTO: 3.5 10E6/UL (ref 4.4–5.9)
RBC MORPH BLD: NORMAL
SAO2 % BLDA: 100 % (ref 95–96)
SAO2 % BLDA: 79 % (ref 95–96)
SAO2 % BLDA: 98.9 % (ref 95–96)
SAO2 % BLDA: 99.6 % (ref 95–96)
SAO2 % BLDA: >100 % (ref 95–96)
SAO2 % BLDV: 75.8 % (ref 70–75)
SODIUM BLD-SCNC: 133 MMOL/L (ref 135–145)
SODIUM BLD-SCNC: 134 MMOL/L (ref 135–145)
SODIUM BLD-SCNC: 135 MMOL/L (ref 135–145)
SODIUM SERPL-SCNC: 135 MMOL/L (ref 135–145)
SODIUM SERPL-SCNC: 135 MMOL/L (ref 135–145)
WBC # BLD AUTO: 3.1 10E3/UL (ref 4–11)
WBC # BLD AUTO: 7.8 10E3/UL (ref 4–11)
WBC # BLD AUTO: 9.1 10E3/UL (ref 4–11)

## 2025-06-02 PROCEDURE — 250N000009 HC RX 250

## 2025-06-02 PROCEDURE — 250N000011 HC RX IP 250 OP 636: Performed by: SURGERY

## 2025-06-02 PROCEDURE — 83735 ASSAY OF MAGNESIUM: CPT | Performed by: SURGERY

## 2025-06-02 PROCEDURE — 85014 HEMATOCRIT: CPT

## 2025-06-02 PROCEDURE — 250N000011 HC RX IP 250 OP 636: Performed by: PHYSICIAN ASSISTANT

## 2025-06-02 PROCEDURE — 33518 CABG ARTERY-VEIN TWO: CPT | Mod: GC | Performed by: SURGERY

## 2025-06-02 PROCEDURE — 250N000024 HC ISOFLURANE, PER MIN: Performed by: SURGERY

## 2025-06-02 PROCEDURE — 999N000141 HC STATISTIC PRE-PROCEDURE NURSING ASSESSMENT: Performed by: SURGERY

## 2025-06-02 PROCEDURE — 84100 ASSAY OF PHOSPHORUS: CPT | Performed by: SURGERY

## 2025-06-02 PROCEDURE — 93287 PERI-PX DEVICE EVAL & PRGR: CPT | Mod: 26 | Performed by: INTERNAL MEDICINE

## 2025-06-02 PROCEDURE — 82805 BLOOD GASES W/O2 SATURATION: CPT | Performed by: SURGERY

## 2025-06-02 PROCEDURE — 71045 X-RAY EXAM CHEST 1 VIEW: CPT | Mod: 26 | Performed by: RADIOLOGY

## 2025-06-02 PROCEDURE — 82565 ASSAY OF CREATININE: CPT | Performed by: SURGERY

## 2025-06-02 PROCEDURE — 272N000001 HC OR GENERAL SUPPLY STERILE: Performed by: SURGERY

## 2025-06-02 PROCEDURE — 33533 CABG ARTERIAL SINGLE: CPT | Mod: GC | Performed by: SURGERY

## 2025-06-02 PROCEDURE — 83605 ASSAY OF LACTIC ACID: CPT

## 2025-06-02 PROCEDURE — 250N000009 HC RX 250: Performed by: SURGERY

## 2025-06-02 PROCEDURE — 99291 CRITICAL CARE FIRST HOUR: CPT | Mod: 24 | Performed by: ANESTHESIOLOGY

## 2025-06-02 PROCEDURE — 85027 COMPLETE CBC AUTOMATED: CPT | Performed by: SURGERY

## 2025-06-02 PROCEDURE — 360N000079 HC SURGERY LEVEL 6, PER MIN: Performed by: SURGERY

## 2025-06-02 PROCEDURE — 85041 AUTOMATED RBC COUNT: CPT | Performed by: STUDENT IN AN ORGANIZED HEALTH CARE EDUCATION/TRAINING PROGRAM

## 2025-06-02 PROCEDURE — 250N000009 HC RX 250: Performed by: STUDENT IN AN ORGANIZED HEALTH CARE EDUCATION/TRAINING PROGRAM

## 2025-06-02 PROCEDURE — 84295 ASSAY OF SERUM SODIUM: CPT

## 2025-06-02 PROCEDURE — 258N000003 HC RX IP 258 OP 636: Performed by: STUDENT IN AN ORGANIZED HEALTH CARE EDUCATION/TRAINING PROGRAM

## 2025-06-02 PROCEDURE — 250N000011 HC RX IP 250 OP 636: Mod: JZ | Performed by: STUDENT IN AN ORGANIZED HEALTH CARE EDUCATION/TRAINING PROGRAM

## 2025-06-02 PROCEDURE — 74018 RADEX ABDOMEN 1 VIEW: CPT | Mod: 26 | Performed by: RADIOLOGY

## 2025-06-02 PROCEDURE — 85730 THROMBOPLASTIN TIME PARTIAL: CPT | Performed by: SURGERY

## 2025-06-02 PROCEDURE — P9047 ALBUMIN (HUMAN), 25%, 50ML: HCPCS

## 2025-06-02 PROCEDURE — 272N000088 HC PUMP APP ADULT PERFUSION: Performed by: SURGERY

## 2025-06-02 PROCEDURE — 999N000157 HC STATISTIC RCP TIME EA 10 MIN

## 2025-06-02 PROCEDURE — 250N000013 HC RX MED GY IP 250 OP 250 PS 637: Performed by: SURGERY

## 2025-06-02 PROCEDURE — 83605 ASSAY OF LACTIC ACID: CPT | Performed by: SURGERY

## 2025-06-02 PROCEDURE — 85396 CLOTTING ASSAY WHOLE BLOOD: CPT

## 2025-06-02 PROCEDURE — 250N000012 HC RX MED GY IP 250 OP 636 PS 637: Performed by: SURGERY

## 2025-06-02 PROCEDURE — 258N000003 HC RX IP 258 OP 636: Performed by: SURGERY

## 2025-06-02 PROCEDURE — 93287 PERI-PX DEVICE EVAL & PRGR: CPT

## 2025-06-02 PROCEDURE — 33508 ENDOSCOPIC VEIN HARVEST: CPT | Mod: XU | Performed by: SURGERY

## 2025-06-02 PROCEDURE — 410N000004: Performed by: SURGERY

## 2025-06-02 PROCEDURE — 94002 VENT MGMT INPAT INIT DAY: CPT

## 2025-06-02 PROCEDURE — 85018 HEMOGLOBIN: CPT | Performed by: STUDENT IN AN ORGANIZED HEALTH CARE EDUCATION/TRAINING PROGRAM

## 2025-06-02 PROCEDURE — 85384 FIBRINOGEN ACTIVITY: CPT | Performed by: STUDENT IN AN ORGANIZED HEALTH CARE EDUCATION/TRAINING PROGRAM

## 2025-06-02 PROCEDURE — 250N000011 HC RX IP 250 OP 636: Mod: JZ

## 2025-06-02 PROCEDURE — 250N000011 HC RX IP 250 OP 636

## 2025-06-02 PROCEDURE — 84295 ASSAY OF SERUM SODIUM: CPT | Performed by: SURGERY

## 2025-06-02 PROCEDURE — 999N000253 HC STATISTIC WEANING TRIALS

## 2025-06-02 PROCEDURE — 999N000065 XR ABDOMEN PORT 1 VIEW

## 2025-06-02 PROCEDURE — 272N000085 HC PACK CELL SAVER CSP: Performed by: SURGERY

## 2025-06-02 PROCEDURE — 370N000017 HC ANESTHESIA TECHNICAL FEE, PER MIN: Performed by: SURGERY

## 2025-06-02 PROCEDURE — 85730 THROMBOPLASTIN TIME PARTIAL: CPT | Performed by: STUDENT IN AN ORGANIZED HEALTH CARE EDUCATION/TRAINING PROGRAM

## 2025-06-02 PROCEDURE — 999N000065 XR CHEST PORT 1 VIEW

## 2025-06-02 PROCEDURE — 82805 BLOOD GASES W/O2 SATURATION: CPT

## 2025-06-02 PROCEDURE — C1898 LEAD, PMKR, OTHER THAN TRANS: HCPCS | Performed by: SURGERY

## 2025-06-02 PROCEDURE — 200N000002 HC R&B ICU UMMC

## 2025-06-02 PROCEDURE — 82330 ASSAY OF CALCIUM: CPT | Performed by: SURGERY

## 2025-06-02 PROCEDURE — 999N000259 HC STATISTIC EXTUBATION

## 2025-06-02 PROCEDURE — 93005 ELECTROCARDIOGRAM TRACING: CPT

## 2025-06-02 PROCEDURE — 85610 PROTHROMBIN TIME: CPT | Performed by: SURGERY

## 2025-06-02 PROCEDURE — 85610 PROTHROMBIN TIME: CPT | Performed by: STUDENT IN AN ORGANIZED HEALTH CARE EDUCATION/TRAINING PROGRAM

## 2025-06-02 PROCEDURE — 410N000003 HC PER-PERFUSION 1ST 30 MIN: Performed by: SURGERY

## 2025-06-02 RX ORDER — LIDOCAINE 40 MG/G
CREAM TOPICAL
Status: DISCONTINUED | OUTPATIENT
Start: 2025-06-02 | End: 2025-06-02 | Stop reason: HOSPADM

## 2025-06-02 RX ORDER — NALOXONE HYDROCHLORIDE 0.4 MG/ML
0.2 INJECTION, SOLUTION INTRAMUSCULAR; INTRAVENOUS; SUBCUTANEOUS
Status: ACTIVE | OUTPATIENT
Start: 2025-06-02

## 2025-06-02 RX ORDER — PANTOPRAZOLE SODIUM 40 MG/1
40 TABLET, DELAYED RELEASE ORAL DAILY
Status: DISPENSED | OUTPATIENT
Start: 2025-06-03

## 2025-06-02 RX ORDER — NOREPINEPHRINE BITARTRATE 0.06 MG/ML
.01-.15 INJECTION, SOLUTION INTRAVENOUS CONTINUOUS
Status: DISCONTINUED | OUTPATIENT
Start: 2025-06-02 | End: 2025-06-02

## 2025-06-02 RX ORDER — SODIUM CHLORIDE, SODIUM LACTATE, POTASSIUM CHLORIDE, CALCIUM CHLORIDE 600; 310; 30; 20 MG/100ML; MG/100ML; MG/100ML; MG/100ML
INJECTION, SOLUTION INTRAVENOUS CONTINUOUS
Status: DISCONTINUED | OUTPATIENT
Start: 2025-06-02 | End: 2025-06-02

## 2025-06-02 RX ORDER — PROPOFOL 10 MG/ML
5-75 INJECTION, EMULSION INTRAVENOUS CONTINUOUS
Status: DISCONTINUED | OUTPATIENT
Start: 2025-06-02 | End: 2025-06-02

## 2025-06-02 RX ORDER — PROCHLORPERAZINE MALEATE 5 MG/1
5 TABLET ORAL EVERY 6 HOURS PRN
Status: DISCONTINUED | OUTPATIENT
Start: 2025-06-02 | End: 2025-06-05

## 2025-06-02 RX ORDER — LIDOCAINE HYDROCHLORIDE 20 MG/ML
INJECTION, SOLUTION INFILTRATION; PERINEURAL PRN
Status: DISCONTINUED | OUTPATIENT
Start: 2025-06-02 | End: 2025-06-02

## 2025-06-02 RX ORDER — EPINEPHRINE IN 0.9 % SOD CHLOR 5 MG/250ML
.01-.1 PLASTIC BAG, INJECTION (ML) INTRAVENOUS CONTINUOUS
Status: DISCONTINUED | OUTPATIENT
Start: 2025-06-02 | End: 2025-06-02

## 2025-06-02 RX ORDER — EPINEPHRINE IN 0.9 % SOD CHLOR 5 MG/250ML
.01-.3 PLASTIC BAG, INJECTION (ML) INTRAVENOUS CONTINUOUS
Status: DISCONTINUED | OUTPATIENT
Start: 2025-06-02 | End: 2025-06-02 | Stop reason: HOSPADM

## 2025-06-02 RX ORDER — HEPARIN SOD,PORCINE/0.9 % NACL 30K/1000ML
INTRAVENOUS SOLUTION INTRAVENOUS
Status: DISCONTINUED | OUTPATIENT
Start: 2025-06-02 | End: 2025-06-02 | Stop reason: HOSPADM

## 2025-06-02 RX ORDER — CEFAZOLIN SODIUM/WATER 2 G/20 ML
2 SYRINGE (ML) INTRAVENOUS
Status: COMPLETED | OUTPATIENT
Start: 2025-06-02 | End: 2025-06-02

## 2025-06-02 RX ORDER — DEXTROSE MONOHYDRATE 25 G/50ML
25-50 INJECTION, SOLUTION INTRAVENOUS
Status: ACTIVE | OUTPATIENT
Start: 2025-06-02

## 2025-06-02 RX ORDER — ONDANSETRON 4 MG/1
4 TABLET, ORALLY DISINTEGRATING ORAL EVERY 6 HOURS PRN
Status: ACTIVE | OUTPATIENT
Start: 2025-06-02

## 2025-06-02 RX ORDER — HYDRALAZINE HYDROCHLORIDE 20 MG/ML
10 INJECTION INTRAMUSCULAR; INTRAVENOUS EVERY 30 MIN PRN
Status: DISCONTINUED | OUTPATIENT
Start: 2025-06-02 | End: 2025-06-05

## 2025-06-02 RX ORDER — VASOPRESSIN IN 0.9 % NACL 2 UNIT/2ML
SYRINGE (ML) INTRAVENOUS PRN
Status: DISCONTINUED | OUTPATIENT
Start: 2025-06-02 | End: 2025-06-02

## 2025-06-02 RX ORDER — BISACODYL 10 MG
10 SUPPOSITORY, RECTAL RECTAL DAILY PRN
Status: DISPENSED | OUTPATIENT
Start: 2025-06-05

## 2025-06-02 RX ORDER — LIDOCAINE 40 MG/G
CREAM TOPICAL
Status: ACTIVE | OUTPATIENT
Start: 2025-06-02

## 2025-06-02 RX ORDER — NOREPINEPHRINE BITARTRATE 0.06 MG/ML
.01-.1 INJECTION, SOLUTION INTRAVENOUS CONTINUOUS
Status: DISCONTINUED | OUTPATIENT
Start: 2025-06-02 | End: 2025-06-02 | Stop reason: HOSPADM

## 2025-06-02 RX ORDER — NITROGLYCERIN 10 MG/100ML
INJECTION INTRAVENOUS PRN
Status: DISCONTINUED | OUTPATIENT
Start: 2025-06-02 | End: 2025-06-02

## 2025-06-02 RX ORDER — CALCIUM GLUCONATE 20 MG/ML
1 INJECTION, SOLUTION INTRAVENOUS EVERY 6 HOURS PRN
Status: DISCONTINUED | OUTPATIENT
Start: 2025-06-02 | End: 2025-06-05

## 2025-06-02 RX ORDER — ACETAMINOPHEN 325 MG/1
975 TABLET ORAL ONCE
Status: COMPLETED | OUTPATIENT
Start: 2025-06-02 | End: 2025-06-02

## 2025-06-02 RX ORDER — POLYETHYLENE GLYCOL 3350 17 G/17G
17 POWDER, FOR SOLUTION ORAL DAILY
Status: DISPENSED | OUTPATIENT
Start: 2025-06-03

## 2025-06-02 RX ORDER — NALOXONE HYDROCHLORIDE 0.4 MG/ML
0.4 INJECTION, SOLUTION INTRAMUSCULAR; INTRAVENOUS; SUBCUTANEOUS
Status: ACTIVE | OUTPATIENT
Start: 2025-06-02

## 2025-06-02 RX ORDER — GABAPENTIN 100 MG/1
100 CAPSULE ORAL
Status: COMPLETED | OUTPATIENT
Start: 2025-06-02 | End: 2025-06-02

## 2025-06-02 RX ORDER — OXYCODONE HYDROCHLORIDE 10 MG/1
10 TABLET ORAL EVERY 4 HOURS PRN
Status: DISCONTINUED | OUTPATIENT
Start: 2025-06-02 | End: 2025-06-05

## 2025-06-02 RX ORDER — LIDOCAINE HYDROCHLORIDE 10 MG/ML
INJECTION, SOLUTION INFILTRATION; PERINEURAL
Status: COMPLETED | OUTPATIENT
Start: 2025-06-02 | End: 2025-06-02

## 2025-06-02 RX ORDER — DEXMEDETOMIDINE HYDROCHLORIDE 4 UG/ML
.2-.7 INJECTION, SOLUTION INTRAVENOUS CONTINUOUS
Status: DISCONTINUED | OUTPATIENT
Start: 2025-06-02 | End: 2025-06-02

## 2025-06-02 RX ORDER — HYDRALAZINE HYDROCHLORIDE 20 MG/ML
10 INJECTION INTRAMUSCULAR; INTRAVENOUS EVERY 30 MIN PRN
Status: DISCONTINUED | OUTPATIENT
Start: 2025-06-02 | End: 2025-06-02

## 2025-06-02 RX ORDER — CALCIUM GLUCONATE 20 MG/ML
2 INJECTION, SOLUTION INTRAVENOUS EVERY 6 HOURS PRN
Status: DISCONTINUED | OUTPATIENT
Start: 2025-06-02 | End: 2025-06-05

## 2025-06-02 RX ORDER — NICOTINE POLACRILEX 4 MG
15-30 LOZENGE BUCCAL
Status: ACTIVE | OUTPATIENT
Start: 2025-06-02

## 2025-06-02 RX ORDER — HYDROMORPHONE HYDROCHLORIDE 1 MG/ML
0.3 INJECTION, SOLUTION INTRAMUSCULAR; INTRAVENOUS; SUBCUTANEOUS
Status: DISCONTINUED | OUTPATIENT
Start: 2025-06-02 | End: 2025-06-05

## 2025-06-02 RX ORDER — SODIUM CHLORIDE, SODIUM LACTATE, POTASSIUM CHLORIDE, CALCIUM CHLORIDE 600; 310; 30; 20 MG/100ML; MG/100ML; MG/100ML; MG/100ML
INJECTION, SOLUTION INTRAVENOUS CONTINUOUS PRN
Status: DISCONTINUED | OUTPATIENT
Start: 2025-06-02 | End: 2025-06-02

## 2025-06-02 RX ORDER — ASPIRIN 81 MG/1
162 TABLET, CHEWABLE ORAL
Status: COMPLETED | OUTPATIENT
Start: 2025-06-02 | End: 2025-06-02

## 2025-06-02 RX ORDER — DEXMEDETOMIDINE HYDROCHLORIDE 4 UG/ML
.1-1.2 INJECTION, SOLUTION INTRAVENOUS CONTINUOUS
Status: DISCONTINUED | OUTPATIENT
Start: 2025-06-02 | End: 2025-06-02 | Stop reason: HOSPADM

## 2025-06-02 RX ORDER — FAMOTIDINE 20 MG/1
20 TABLET, FILM COATED ORAL
Status: COMPLETED | OUTPATIENT
Start: 2025-06-02 | End: 2025-06-02

## 2025-06-02 RX ORDER — HEPARIN SODIUM 5000 [USP'U]/.5ML
5000 INJECTION, SOLUTION INTRAVENOUS; SUBCUTANEOUS EVERY 8 HOURS
Status: DISPENSED | OUTPATIENT
Start: 2025-06-03

## 2025-06-02 RX ORDER — ONDANSETRON 2 MG/ML
4 INJECTION INTRAMUSCULAR; INTRAVENOUS EVERY 6 HOURS PRN
Status: DISPENSED | OUTPATIENT
Start: 2025-06-02

## 2025-06-02 RX ORDER — ASPIRIN 81 MG/1
162 TABLET, CHEWABLE ORAL DAILY
Status: DISCONTINUED | OUTPATIENT
Start: 2025-06-02 | End: 2025-06-04

## 2025-06-02 RX ORDER — PHENYLEPHRINE HCL IN 0.9% NACL 50MG/250ML
.1-6 PLASTIC BAG, INJECTION (ML) INTRAVENOUS CONTINUOUS
Status: DISCONTINUED | OUTPATIENT
Start: 2025-06-02 | End: 2025-06-02 | Stop reason: HOSPADM

## 2025-06-02 RX ORDER — OXYCODONE HYDROCHLORIDE 5 MG/1
5 TABLET ORAL EVERY 4 HOURS PRN
Status: DISCONTINUED | OUTPATIENT
Start: 2025-06-02 | End: 2025-06-05

## 2025-06-02 RX ORDER — FENTANYL CITRATE 50 UG/ML
INJECTION, SOLUTION INTRAMUSCULAR; INTRAVENOUS PRN
Status: DISCONTINUED | OUTPATIENT
Start: 2025-06-02 | End: 2025-06-02

## 2025-06-02 RX ORDER — CEFAZOLIN SODIUM/WATER 2 G/20 ML
2 SYRINGE (ML) INTRAVENOUS SEE ADMIN INSTRUCTIONS
Status: DISCONTINUED | OUTPATIENT
Start: 2025-06-02 | End: 2025-06-02 | Stop reason: HOSPADM

## 2025-06-02 RX ORDER — CHLORHEXIDINE GLUCONATE ORAL RINSE 1.2 MG/ML
15 SOLUTION DENTAL EVERY 12 HOURS
Status: DISCONTINUED | OUTPATIENT
Start: 2025-06-02 | End: 2025-06-02

## 2025-06-02 RX ORDER — PROPOFOL 10 MG/ML
INJECTION, EMULSION INTRAVENOUS CONTINUOUS PRN
Status: DISCONTINUED | OUTPATIENT
Start: 2025-06-02 | End: 2025-06-02

## 2025-06-02 RX ORDER — HYDROMORPHONE HYDROCHLORIDE 1 MG/ML
0.5 INJECTION, SOLUTION INTRAMUSCULAR; INTRAVENOUS; SUBCUTANEOUS
Status: DISCONTINUED | OUTPATIENT
Start: 2025-06-02 | End: 2025-06-05

## 2025-06-02 RX ORDER — NOREPINEPHRINE BITARTRATE 0.06 MG/ML
.01-.6 INJECTION, SOLUTION INTRAVENOUS CONTINUOUS
Status: DISCONTINUED | OUTPATIENT
Start: 2025-06-02 | End: 2025-06-03

## 2025-06-02 RX ORDER — ASPIRIN 81 MG/1
81 TABLET, CHEWABLE ORAL
Status: COMPLETED | OUTPATIENT
Start: 2025-06-02 | End: 2025-06-02

## 2025-06-02 RX ORDER — ACETAMINOPHEN 325 MG/1
975 TABLET ORAL EVERY 8 HOURS
Status: DISPENSED | OUTPATIENT
Start: 2025-06-02

## 2025-06-02 RX ORDER — SODIUM CHLORIDE, SODIUM GLUCONATE, SODIUM ACETATE, POTASSIUM CHLORIDE AND MAGNESIUM CHLORIDE 526; 502; 368; 37; 30 MG/100ML; MG/100ML; MG/100ML; MG/100ML; MG/100ML
INJECTION, SOLUTION INTRAVENOUS CONTINUOUS PRN
Status: DISCONTINUED | OUTPATIENT
Start: 2025-06-02 | End: 2025-06-02

## 2025-06-02 RX ORDER — PROPOFOL 10 MG/ML
INJECTION, EMULSION INTRAVENOUS PRN
Status: DISCONTINUED | OUTPATIENT
Start: 2025-06-02 | End: 2025-06-02

## 2025-06-02 RX ORDER — AMOXICILLIN 250 MG
1 CAPSULE ORAL 2 TIMES DAILY
Status: DISPENSED | OUTPATIENT
Start: 2025-06-02

## 2025-06-02 RX ORDER — CHLORHEXIDINE GLUCONATE ORAL RINSE 1.2 MG/ML
10 SOLUTION DENTAL ONCE
Status: COMPLETED | OUTPATIENT
Start: 2025-06-02 | End: 2025-06-02

## 2025-06-02 RX ORDER — CEFAZOLIN SODIUM 1 G/3ML
1 INJECTION, POWDER, FOR SOLUTION INTRAMUSCULAR; INTRAVENOUS EVERY 24 HOURS
Status: COMPLETED | OUTPATIENT
Start: 2025-06-03 | End: 2025-06-03

## 2025-06-02 RX ORDER — PROTAMINE SULFATE 10 MG/ML
INJECTION, SOLUTION INTRAVENOUS PRN
Status: DISCONTINUED | OUTPATIENT
Start: 2025-06-02 | End: 2025-06-02

## 2025-06-02 RX ORDER — HYDRALAZINE HYDROCHLORIDE 20 MG/ML
20 INJECTION INTRAMUSCULAR; INTRAVENOUS EVERY 30 MIN PRN
Status: DISCONTINUED | OUTPATIENT
Start: 2025-06-02 | End: 2025-06-02

## 2025-06-02 RX ORDER — HEPARIN SODIUM 1000 [USP'U]/ML
INJECTION, SOLUTION INTRAVENOUS; SUBCUTANEOUS PRN
Status: DISCONTINUED | OUTPATIENT
Start: 2025-06-02 | End: 2025-06-02

## 2025-06-02 RX ORDER — CALCIUM CHLORIDE 100 MG/ML
INJECTION INTRAVENOUS; INTRAVENTRICULAR PRN
Status: DISCONTINUED | OUTPATIENT
Start: 2025-06-02 | End: 2025-06-02

## 2025-06-02 RX ADMIN — NOREPINEPHRINE BITARTRATE 12.4 MCG: 1 INJECTION, SOLUTION, CONCENTRATE INTRAVENOUS at 10:30

## 2025-06-02 RX ADMIN — HYDROMORPHONE HYDROCHLORIDE 1 MG: 1 INJECTION, SOLUTION INTRAMUSCULAR; INTRAVENOUS; SUBCUTANEOUS at 12:23

## 2025-06-02 RX ADMIN — OXYCODONE HYDROCHLORIDE 5 MG: 5 TABLET ORAL at 18:37

## 2025-06-02 RX ADMIN — NOREPINEPHRINE BITARTRATE 0.02 MCG/KG/MIN: 0.06 INJECTION, SOLUTION INTRAVENOUS at 08:28

## 2025-06-02 RX ADMIN — HYDRALAZINE HYDROCHLORIDE 10 MG: 20 INJECTION INTRAMUSCULAR; INTRAVENOUS at 15:10

## 2025-06-02 RX ADMIN — PHENYLEPHRINE HYDROCHLORIDE 200 MCG: 10 INJECTION INTRAVENOUS at 10:31

## 2025-06-02 RX ADMIN — ACETAMINOPHEN 975 MG: 325 TABLET ORAL at 06:08

## 2025-06-02 RX ADMIN — ASPIRIN 81 MG CHEWABLE TABLET 162 MG: 81 TABLET CHEWABLE at 19:27

## 2025-06-02 RX ADMIN — FENTANYL CITRATE 100 MCG: 50 INJECTION INTRAMUSCULAR; INTRAVENOUS at 08:22

## 2025-06-02 RX ADMIN — AMINOCAPROIC ACID 1 G/HR: 250 INJECTION, SOLUTION INTRAVENOUS at 08:58

## 2025-06-02 RX ADMIN — PHENYLEPHRINE HYDROCHLORIDE 50 MCG: 10 INJECTION INTRAVENOUS at 09:26

## 2025-06-02 RX ADMIN — CALCIUM CHLORIDE INJECTION 200 MG: 100 INJECTION, SOLUTION INTRAVENOUS at 12:33

## 2025-06-02 RX ADMIN — ASPIRIN 162 MG: 81 TABLET ORAL at 06:08

## 2025-06-02 RX ADMIN — PROPOFOL 50 MG: 10 INJECTION, EMULSION INTRAVENOUS at 07:48

## 2025-06-02 RX ADMIN — HYDROMORPHONE HYDROCHLORIDE 0.3 MG: 1 INJECTION, SOLUTION INTRAMUSCULAR; INTRAVENOUS; SUBCUTANEOUS at 17:13

## 2025-06-02 RX ADMIN — PHENYLEPHRINE HYDROCHLORIDE 50 MCG: 10 INJECTION INTRAVENOUS at 07:47

## 2025-06-02 RX ADMIN — Medication 12.5 MG: at 06:08

## 2025-06-02 RX ADMIN — AMINOCAPROIC ACID 5 G: 250 INJECTION, SOLUTION INTRAVENOUS at 08:09

## 2025-06-02 RX ADMIN — Medication 20 MG: at 10:10

## 2025-06-02 RX ADMIN — PROPOFOL 100 MG: 10 INJECTION, EMULSION INTRAVENOUS at 07:47

## 2025-06-02 RX ADMIN — NITROGLYCERIN 100 MCG: 10 INJECTION INTRAVENOUS at 09:16

## 2025-06-02 RX ADMIN — FENTANYL CITRATE 100 MCG: 50 INJECTION INTRAMUSCULAR; INTRAVENOUS at 09:27

## 2025-06-02 RX ADMIN — Medication 30 MG: at 09:14

## 2025-06-02 RX ADMIN — ACETAMINOPHEN 975 MG: 325 TABLET, FILM COATED ORAL at 15:39

## 2025-06-02 RX ADMIN — PHENYLEPHRINE HYDROCHLORIDE 50 MCG: 10 INJECTION INTRAVENOUS at 09:49

## 2025-06-02 RX ADMIN — PROTAMINE SULFATE 250 MG: 10 INJECTION, SOLUTION INTRAVENOUS at 12:04

## 2025-06-02 RX ADMIN — PHENYLEPHRINE HYDROCHLORIDE 50 MCG: 10 INJECTION INTRAVENOUS at 08:41

## 2025-06-02 RX ADMIN — LIDOCAINE HYDROCHLORIDE 2 ML: 10 INJECTION, SOLUTION INFILTRATION; PERINEURAL at 07:34

## 2025-06-02 RX ADMIN — INSULIN HUMAN 1 UNITS/HR: 1 INJECTION, SOLUTION INTRAVENOUS at 18:27

## 2025-06-02 RX ADMIN — SODIUM CHLORIDE, SODIUM LACTATE, POTASSIUM CHLORIDE, AND CALCIUM CHLORIDE 1000 ML: .6; .31; .03; .02 INJECTION, SOLUTION INTRAVENOUS at 16:07

## 2025-06-02 RX ADMIN — Medication 0.5 UNITS: at 09:53

## 2025-06-02 RX ADMIN — PHENYLEPHRINE HYDROCHLORIDE 50 MCG: 10 INJECTION INTRAVENOUS at 09:02

## 2025-06-02 RX ADMIN — SODIUM CHLORIDE, SODIUM LACTATE, POTASSIUM CHLORIDE, AND CALCIUM CHLORIDE: .6; .31; .03; .02 INJECTION, SOLUTION INTRAVENOUS at 08:09

## 2025-06-02 RX ADMIN — PHENYLEPHRINE HYDROCHLORIDE 50 MCG: 10 INJECTION INTRAVENOUS at 08:45

## 2025-06-02 RX ADMIN — PROPOFOL 50 MCG/KG/MIN: 10 INJECTION, EMULSION INTRAVENOUS at 12:36

## 2025-06-02 RX ADMIN — EPINEPHRINE 0.03 MCG/KG/MIN: 1 INJECTION INTRAMUSCULAR; INTRAVENOUS; SUBCUTANEOUS at 11:54

## 2025-06-02 RX ADMIN — FENTANYL CITRATE 150 MCG: 50 INJECTION INTRAMUSCULAR; INTRAVENOUS at 09:12

## 2025-06-02 RX ADMIN — Medication 100 MG: at 07:47

## 2025-06-02 RX ADMIN — FAMOTIDINE 20 MG: 20 TABLET, FILM COATED ORAL at 06:08

## 2025-06-02 RX ADMIN — GABAPENTIN 100 MG: 100 CAPSULE ORAL at 06:08

## 2025-06-02 RX ADMIN — FENTANYL CITRATE 150 MCG: 50 INJECTION INTRAMUSCULAR; INTRAVENOUS at 11:59

## 2025-06-02 RX ADMIN — CALCIUM CHLORIDE INJECTION 200 MG: 100 INJECTION, SOLUTION INTRAVENOUS at 12:55

## 2025-06-02 RX ADMIN — SENNOSIDES AND DOCUSATE SODIUM 1 TABLET: 50; 8.6 TABLET ORAL at 19:27

## 2025-06-02 RX ADMIN — CALCIUM CHLORIDE INJECTION 200 MG: 100 INJECTION, SOLUTION INTRAVENOUS at 12:19

## 2025-06-02 RX ADMIN — HEPARIN SODIUM 34000 UNITS: 1000 INJECTION INTRAVENOUS; SUBCUTANEOUS at 09:58

## 2025-06-02 RX ADMIN — FENTANYL CITRATE 150 MCG: 50 INJECTION INTRAMUSCULAR; INTRAVENOUS at 08:47

## 2025-06-02 RX ADMIN — CHLORHEXIDINE GLUCONATE 10 ML: 1.2 SOLUTION ORAL at 06:08

## 2025-06-02 RX ADMIN — FENTANYL CITRATE 100 MCG: 50 INJECTION INTRAMUSCULAR; INTRAVENOUS at 09:16

## 2025-06-02 RX ADMIN — ONDANSETRON 4 MG: 2 INJECTION, SOLUTION INTRAMUSCULAR; INTRAVENOUS at 18:08

## 2025-06-02 RX ADMIN — PROPOFOL 10 MCG/KG/MIN: 10 INJECTION, EMULSION INTRAVENOUS at 14:35

## 2025-06-02 RX ADMIN — Medication 2 G: at 12:37

## 2025-06-02 RX ADMIN — ACETAMINOPHEN 975 MG: 325 TABLET, FILM COATED ORAL at 23:17

## 2025-06-02 RX ADMIN — Medication 30 MG: at 11:43

## 2025-06-02 RX ADMIN — Medication 2 G: at 08:42

## 2025-06-02 RX ADMIN — LIDOCAINE HYDROCHLORIDE 100 MG: 20 INJECTION, SOLUTION INFILTRATION; PERINEURAL at 07:47

## 2025-06-02 RX ADMIN — SODIUM CHLORIDE, SODIUM GLUCONATE, SODIUM ACETATE, POTASSIUM CHLORIDE AND MAGNESIUM CHLORIDE: 526; 502; 368; 37; 30 INJECTION, SOLUTION INTRAVENOUS at 07:45

## 2025-06-02 ASSESSMENT — ACTIVITIES OF DAILY LIVING (ADL)
ADLS_ACUITY_SCORE: 28
ADLS_ACUITY_SCORE: 20
ADLS_ACUITY_SCORE: 28
ADLS_ACUITY_SCORE: 20
ADLS_ACUITY_SCORE: 38
ADLS_ACUITY_SCORE: 28
ADLS_ACUITY_SCORE: 20
ADLS_ACUITY_SCORE: 38
ADLS_ACUITY_SCORE: 44
ADLS_ACUITY_SCORE: 42
ADLS_ACUITY_SCORE: 20
ADLS_ACUITY_SCORE: 44
ADLS_ACUITY_SCORE: 38

## 2025-06-02 NOTE — ANESTHESIA PROCEDURE NOTES
Central Line/PA Catheter Placement    Pre-Procedure   Staff -        Anesthesiologist:  Ubaldo Tamayo MD       Resident/Fellow: Lynsey Malik MD       Performed By: resident       Location: OR       Pre-Anesthestic Checklist: patient identified, IV checked, site marked, risks and benefits discussed, informed consent, monitors and equipment checked, pre-op evaluation and at physician/surgeon's request  Timeout:       Correct Patient: Yes        Correct Procedure: Yes        Correct Site: Yes        Correct Position: Yes        Correct Laterality: Yes   Line Placement:   This line was placed Post Induction    Procedure   Procedure: central line       Laterality: right       Insertion Site: internal jugular.       Patient Position: Trendelenburg  Sterile Prep        All elements of maximal sterile barrier technique followed       Patient Prep/Sterile Barriers: draped, hand hygiene, gloves , hat , mask , draped, gown, sterile gel and probe cover       Skin prep: Chloraprep  Insertion/Injection        Technique: ultrasound guided and Seldinger Technique        1. Ultrasound was used to evaluate the access site.       2. Vein evaluated via ultrasound for patency/adequacy.       3. Using real-time ultrasound the needle/catheter was observed entering the artery/vein.       5. The visualized structures were anatomically normal.       Introducer Type: 9 Fr, 2-lumen MAC        Type: PA/CVC with Introducer       Catheter Size: 9 Fr       Catheter Length: 15  Narrative         Secured by: suture       Tegaderm and Biopatch dressing used.       Complications: None apparent,        blood aspirated from all lumens,        All lumens flushed: Yes       Verification method: Ultrasound, Placement to be verified post-op and YULIA       Tip termination: The catheter tip was threaded to reside in the superior vena cava subject to post surgical x-ray

## 2025-06-02 NOTE — ANESTHESIA POSTPROCEDURE EVALUATION
Patient: Cesar Rashid    Procedure: Procedure(s):  Median Sternotomy, CORONARY ARTERY BYPASS GRAFT x 3, Left Internal Mammary Glasco, Left Endoscopic Saphenous Vein Glasco, on Cardiopulmonary Bypass, Transesophageal Echocardiogram by Anesthesia       Anesthesia Type:  General    Note:  Disposition: Inpatient; ICU            ICU Sign Out: Anesthesiologist/ICU physician sign out WAS performed   Postop Pain Control: Uneventful            Sign Out: Well controlled pain   PONV: No   Neuro/Psych: Uneventful            Sign Out: Acceptable/Baseline neuro status   Airway/Respiratory: Uneventful            Sign Out: AIRWAY IN SITU/Resp. Support               Airway in situ/Resp. Support: ETT                 Reason: Planned Pre-op   CV/Hemodynamics: Uneventful            Sign Out: Acceptable CV status; No obvious hypovolemia; No obvious fluid overload   Other NRE: NONE   DID A NON-ROUTINE EVENT OCCUR? No           Last vitals:  Vitals:    06/02/25 0515 06/02/25 0548   BP: (!) 156/90 (!) 145/78   Resp: 16    Temp: 36.5  C (97.7  F)    SpO2: 100% 100%       Electronically Signed By: Ubaldo Tamayo MD  June 2, 2025  1:47 PM

## 2025-06-02 NOTE — ANESTHESIA PROCEDURE NOTES
Airway       Patient location during procedure: OR       Procedure Start/Stop Times: 6/2/2025 7:48 AM  Staff -        Anesthesiologist:  Ubaldo Tamayo MD       Resident/Fellow: Lynsey Malik MD       Performed By: resident  Consent for Airway        Urgency: elective  Indications and Patient Condition       Indications for airway management: yvette-procedural       Induction type:intravenous       Mask difficulty assessment: 1 - vent by mask    Final Airway Details       Final airway type: endotracheal airway       Successful airway: ETT - single  Endotracheal Airway Details        ETT size (mm): 8.0       Cuffed: yes       Successful intubation technique: direct laryngoscopy       DL Blade Type: MAC 4       Grade View of Cords: 2       Adjucts: stylet       Position: Right       Measured from: gums/teeth       Secured at (cm): 23       Bite block used: None    Post intubation assessment        Placement verified by: capnometry, equal breath sounds and chest rise        Number of attempts at approach: 1       Secured with: tape       Ease of procedure: easy       Dentition: Intact and Unchanged    Medication(s) Administered   Medication Administration Time: 6/2/2025 7:48 AM

## 2025-06-02 NOTE — PLAN OF CARE
Major Shift Events: CABG x3, pressure support trial completed to successful extubation. Gave 1L LR for hypotension. Removed mckeon catheter d/t pt being on PD and oliguria.     Neuro: A/Ox4, GARG. Left pupil irregular at base line.     CV: SR 60-70's PPM VVI 40, TPW VVI 30   SBP<130 MAP >65    Pulmonary:  Extubated @1705 to 2 L oxymask    GI:     : Mckeon with 0 output. On PD last ran 6/1-6/2 over night     Skin:   Preventative mepi on coccyx    Drains:  2 Meds, 1 plural chest tube     Drips:   Insulin 1    Plan: get up to chair and continue POC  For vital signs and complete assessments, please see documentation flowsheets.

## 2025-06-02 NOTE — CONSULTS
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    ESRD on PD  ESRD 2/2 MPO ANCA vasculitis   Home dialysis unit in [place], [place]. Primary nephrologist Dr. Valdivia. Access w PD catheter.  Dry weight 74.5 kg.  First date of dialysis: 7/17/20. Last session prior to admit: evening of 6/1/25.  Outpatient PD Rx:   Total fill vol: 15.6 L  Cycler time: 12 h  Number of exchanges: 6  Fill vol: 2600 mL  No day dwell    Anemia: hgb 8.7, ferritin 1174, Tsat 28%. On NGUYEN & IV Fe outpatient   Volume/HTN: BP appropriate, euvolemic   Acidosis: bicarb 23  MBD: Ca  9.1, phos 4.2    #CAD s/p CABG x3 (6/2/25)    Recommendations were communicated to primary team via note & verbal    Seen and discussed with Dr. 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 Family at bedside. Patient alert, denies significant pain. Still intubated. PD last night. No recent changes to PD Rx. No PD issues. No recent peritonitis.     REVIEW OF SYSTEMS:  A comprehensive review of systems was negative except as noted above.    PAST MEDICAL HISTORY:  Past Medical History:   Diagnosis Date    Acute ST elevation myocardial infarction (H)     ANCA-associated vasculitis (H)     Bladder cancer (H)     CAD (coronary artery disease)     Dyslipidemia     End stage renal disease (H)     HTN  (hypertension)     ELBERT (iron deficiency anemia)     Ischemic cardiomyopathy     Malignant neoplasm of lateral wall of urinary bladder (H)     Pulmonary nodules     Transfusion of blood product refused for [employer] reason     Jehova's witness       Past Surgical History:   Procedure Laterality Date    CV CORONARY ANGIOGRAM N/A 3/20/2025    Procedure: Coronary Angiogram;  Surgeon: Pepito Helton MD;  Location:  [facility]    repaired ruptured globe Right     TOTAL HIP ARTHROPLASTY Left 04/29/2014    TOTAL HIP ARTHROPLASTY Right 09/02/2014    VASCULAR SURGERY      chest port, peritoneal dialysis catheter        MEDICATIONS:  PTA Meds  Prior to Admission medications    Medication Sig Last Dose Taking? Auth Provider Long Term End Date   aspirin (ASA) 81 MG chewable tablet Take 81 mg by mouth at bedtime. 6/1/2025 Yes Reported, Patient     carvedilol (COREG) 6.25 MG tablet Take 6.25 mg by mouth 2 times daily (with meals) 6/1/2025 Yes Reported, Patient Yes    Coenzyme Q10 (COQ10 PO) Take by mouth every morning. Past Week Yes Reported, Patient     levothyroxine (SYNTHROID/LEVOTHROID) 200 MCG tablet Take 1 tablet by mouth every morning (before breakfast). 6/2/2025 at  4:00 AM Yes Reported, Patient Yes    lisinopril (ZESTRIL) 10 MG tablet Take 10 mg by mouth at bedtime. 6/1/2025 Yes Reported, Patient No    multivitamin w/minerals (MULTI-VITAMIN) tablet Take 1 tablet by mouth daily. Past Week Yes Reported, Patient     Naltrexone HCl, Pain, 4.5 MG CAPS Take 4.5 mg by mouth at bedtime. Past Week Yes Reported, Patient     Omega-3 Fatty Acids (OMEGA-3 FISH OIL PO) Take 1 g by mouth daily. Past Week Yes Reported, Patient     rosuvastatin (CRESTOR) 10 MG tablet Take 10 mg by mouth at bedtime. 6/1/2025 Yes Reported, Patient Yes 2/25/26   sevelamer HCl (RENAGEL) 800 MG tablet Take 800 mg by mouth 3 times daily (with meals) 6/1/2025 Yes Reported, Patient     UNABLE TO FIND Inject 120 mcg subcutaneously every 30  days. MEDICATION NAME: Micera is given monthly as needed based on patient's Hgb results.Last dose End April 2025 Past Week Yes Reported, Patient     UNABLE TO FIND Take 2 capsules by mouth 2 times daily. MEDICATION NAME: arterosil Past Week Yes Reported, Patient     UNABLE TO FIND Take 3 tablets by mouth 2 times daily. MEDICATION NAME: Cardiogenics supplement Past Week Yes Reported, Patient     vitamin E 400 units TABS Take 400 Units by mouth every morning. Past Week Yes Reported, Patient     nitroGLYcerin (NITROSTAT) 0.4 MG sublingual tablet Place 0.4 mg under the tongue every 5 minutes as needed.   Reported, Patient        Current Meds  Current Facility-Administered Medications   Medication Dose Route Frequency Provider Last Rate Last Admin    acetaminophen (TYLENOL) tablet 975 mg  975 mg Oral or Feeding Tube Q8H Zafar Allan MD        aspirin (ASA) chewable tablet 162 mg  162 mg Oral or NG Tube Daily Zafar Allan MD        [START ON 6/3/2025] ceFAZolin (ANCEF) 1 g vial to attach to  ml bag for ADULT or 50 ml bag for PEDS  1 g Intravenous Q24H Zafar Allan MD        chlorhexidine (PERIDEX) 0.12 % solution 15 mL  15 mL Mouth/Throat Q12H Johan Roberts MD        [START ON 6/3/2025] heparin ANTICOAGULANT injection 5,000 Units  5,000 Units Subcutaneous Q8H Zafar Allan MD        [START ON 6/3/2025] levothyroxine (SYNTHROID/LEVOTHROID) tablet 200 mcg  200 mcg Oral or Feeding Tube QAM AC Zafar Allan MD        [START ON 6/3/2025] pantoprazole (PROTONIX) 2 mg/mL suspension 40 mg  40 mg Oral or NG Tube Daily Zafar Allan MD        Or    [START ON 6/3/2025] pantoprazole (PROTONIX) EC tablet 40 mg  40 mg Oral Daily Zafar Allan MD        [START ON 6/3/2025] polyethylene glycol (MIRALAX) Packet 17 g  17 g Oral or Feeding Tube Daily Zafar Allan MD        senna-docusate (SENOKOT-S/PERICOLACE) 8.6-50 MG per tablet 1 tablet  1 tablet Oral or Feeding Tube BID Zafar Allan MD        sodium chloride (PF) 0.9% PF flush 3  mL  3 mL Intracatheter Q8H [facility] Zafar Allan MD         Infusion Meds  Current Facility-Administered Medications   Medication Dose Route Frequency Provider Last Rate Last Admin    aminocaproic acid (AMICAR) 5 g in sodium chloride 0.9 % 100 mL infusion  1 g/hr Intravenous Continuous Zafar Allan MD   Stopped at 06/02/25 1428    insulin regular (MYXREDLIN) 1 unit/mL infusion  0-24 Units/hr Intravenous Continuous Zafar Allan MD   Held at 06/02/25 1405    propofol (DIPRIVAN) infusion  5-75 mcg/kg/min Intravenous Continuous Paul Masterson PA-C   Stopped at 06/02/25 1455    And    Medication Instruction   Does not apply Continuous PRN Paul Masterson PA-C        niCARdipine 40 mg in 200 mL NS (CARDENE) infusion  0.5-15 mg/hr Intravenous Continuous PRN Zafar Allan MD        Reason beta blocker order not selected   Does not apply DOES NOT GO TO MAR Zafar Allan MD           ALLERGIES:    Allergies   Allergen Reactions    Atorvastatin Muscle Pain (Myalgia)     Myalgias (Muscle pain)    Levofloxacin Other (See Comments)    Omeprazole Nausea and Vomiting     Patient denied - no intolerance or allergy to this    Amlodipine Palpitations       SOCIAL HISTORY:   Social History     Socioeconomic History    Marital status:      Spouse name: Not on file    Number of children: Not on file    Years of education: Not on file    Highest education level: Not on file   Occupational History    Not on file   Tobacco Use    Smoking status: Never     Passive exposure: Never    Smokeless tobacco: Never   Substance and Sexual Activity    Alcohol use: Not Currently     Comment: Very little - maybe 2 drinks a year    Drug use: Never    Sexual activity: Not on file   Other Topics Concern    Parent/sibling w/ CABG, MI or angioplasty before 65F 55M? No   Social History Narrative    Not on file     Social Drivers of Health     Financial Resource Strain: Not on File (8/2/2024)    Received from [facility]    Financial Resource Strain      "Financial Resource Strain: 0   Food Insecurity: Not on File (2024)    Received from [facility]    Food Insecurity     Food: 0   Transportation Needs: Not on File (2024)    Received from [facility]    Transportation Needs     Transportation: 0   Physical Activity: Not on File (2024)    Received from [facility]    Physical Activity     Physical Activity: 0   Stress: Not on File (2024)    Received from [facility]    Stress     Stress: 0   Social Connections: Not on File (2024)    Received from [facility]    Social Connections     Connectedness: 0   Interpersonal Safety: Low Risk  (2025)    Interpersonal Safety     Do you feel physically and emotionally safe where you currently live?: Yes     Within the past 12 months, have you been hit, slapped, kicked or otherwise physically hurt by someone?: No     Within the past 12 months, have you been humiliated or emotionally abused in other ways by your partner or ex-partner?: No   Housing Stability: Not on File (2024)    Received from [facility]    Housing Stability     Housin       FAMILY MEDICAL HISTORY:   Family History   Problem Relation Age of Onset    Colon Cancer Mother 80    Coronary Artery Disease Mother     Diabetes Mother     Coronary Artery Disease Father     Thyroid Disease Father     Alcoholism Brother     Diabetes Brother     Anesthesia Reaction No family hx of        OBJECTIVE     PHYSICAL EXAM:   Temp  Av.6  F (36.4  C)  Min: 97.5  F (36.4  C)  Max: 97.7  F (36.5  C)  Arterial Line BP  Min: 103/52  Max: 140/69  Arterial Line MAP (mmHg)  Av.5 mmHg  Min: 69 mmHg  Max: 95 mmHg      Pulse  Av.7  Min: 60  Max: 69 Resp  Av  Min: 16  Max: 16  FiO2 (%)  Av %  Min: 50 %  Max: 50 %  SpO2  Av %  Min: 100 %  Max: 100 %    CVP (mmHg): 11 mmHg  /81 (BP Location: Right arm)   Pulse 69   Temp 97.5  F (36.4  C) (Oral)   Resp 16   Ht 1.753 m (5' 9\")   Wt 75.9 kg (167 lb 5.3 oz)   SpO2 100%   BMI 24.71 kg/m     Date 25 0700 - " 06/03/25 0659   Shift 8546-1215 6141-7657 9786-5896 24 Hour Total   INTAKE   I.V. 1552.78 1.53  1554.31   Other 170   170   IV Piggyback 100   100   Shift Total(mL/kg) 1822.78(24.02) 1.53(0.02)  1824.31(24.04)   OUTPUT   Urine 1 0  1   Chest Tube(mL/kg) 43(0.57) 57(0.75)  100(1.32)   Shift Total(mL/kg) 44(0.58) 57(0.75)  101(1.33)   Weight (kg) 75.9 75.9 75.9 75.9      Admit Weight: 75.9 kg (167 lb 5.3 oz)     General: up in bed   HEENT: ET in place   Cardiac: RRR  Pulmonary: b/l chest rise  Abdominal: nontender  Extremities: no LE edema     LABS:   CMP  Recent Labs   Lab 06/02/25  1334 06/02/25  1243 06/02/25  1212 06/02/25  1147 06/02/25  0629 05/28/25  1050    134* 134* 135   < > 134*   POTASSIUM 4.3 4.3 4.3 4.6   < > 3.3*   CHLORIDE 97*  --   --   --   --  94*   CO2 23  --   --   --   --  24   ANIONGAP 15  --   --   --   --  16*   *  114* 117* 127* 120*   < > 97   BUN 34.6*  --   --   --   --  39.4*   CR 9.24*  --   --   --   --  10.80*   GFRESTIMATED 6*  --   --   --   --  5*   ISIDRO 9.1  --   --   --   --  9.0   MAG 3.0*  --   --   --   --  2.4*   PHOS 4.2  --   --   --   --   --    PROTTOTAL 4.6*  --   --   --   --  6.3*   ALBUMIN 2.8*  --   --   --   --  3.1*   BILITOTAL 0.5  --   --   --   --  0.3   ALKPHOS 59  --   --   --   --  96   AST 29  --   --   --   --  24   ALT 13  --   --   --   --  16    < > = values in this interval not displayed.     CBC  Recent Labs   Lab 06/02/25  1243 06/02/25  1213 06/02/25  1212 06/02/25  1147 06/02/25  0811 05/28/25  1050   HGB 9.7* 8.7* 9.0* 9.0*   < > 13.0*   WBC  --  3.1*  --   --   --  6.9   RBC  --  2.76*  --   --   --  4.19*   HCT  --  26.8*  --   --   --  38.9*   MCV  --  97  --   --   --  93   MCH  --  31.5  --   --   --  31.0   MCHC  --  32.5  --   --   --  33.4   RDW  --  16.5*  --   --   --  16.6*   PLT  --  65*  --   --   --  319    < > = values in this interval not displayed.     INR  Recent Labs   Lab 06/02/25  1334 06/02/25  1213 05/28/25  1050    INR 1.56* 1.76* 1.03   PTT 34 32 31     ABG  Recent Labs   Lab 06/02/25  1338 06/02/25  1334 06/02/25  1243 06/02/25  1212 06/02/25  1147   PH  --  7.40 7.41 7.44 7.42   PCO2  --  41 41 36 41   PO2  --  220* 198* 512* 299*   HCO3  --  26 26 25 26   O2PER 50 75 59.0 70.0 70.0      URINE STUDIES  Recent Labs   Lab Test 04/29/25  1330   COLOR Yellow   APPEARANCE Clear   URINEGLC Negative   URINEBILI Negative   URINEKETONE Negative   SG 1.015   UBLD Small*   URINEPH 8.5*   PROTEIN 100*   UROBILINOGEN 0.2   NITRITE Negative   LEUKEST Negative   RBCU 2-5*   WBCU 0-5     No lab results found.  PTH  No lab results found.  IRON STUDIES  Recent Labs   Lab Test 05/31/25  1159 05/28/25  1050 05/25/25  1212 05/20/25  1228 05/17/25  1007 05/14/25  1316 05/06/25  1601 05/04/25  1055 04/29/25  1530   IRON 38* 47* 46* 29* 33* 35* 401* 119 112   * 167* 151* 186* 150* 184*  --  185* 216*   IRONSAT 28 28 30 16 22 19  --  64* 52*   MAR  --   --   --   --   --   --   --   --  1,174*         Nasir Alcantar MD

## 2025-06-02 NOTE — PROGRESS NOTES
Called to OR regarding no uop after catheter placement.     Bedside US with empty bladder. Patient is on peritoneal dialysis and makes minimal urine. Catheter replaced and irrigated easily, 16 Fr coude with 10cc in balloon.     Plan:  -cathter per primary team  -nursing staff may place catheter in future  -urology will s/o    Sandeep Buckner MD  Chief Urology Resident, PGY-5  [phone]

## 2025-06-02 NOTE — ANESTHESIA CARE TRANSFER NOTE
Patient: Cesar Rashid    Procedure: Procedure(s):  Median Sternotomy, CORONARY ARTERY BYPASS GRAFT x 3, Left Internal Mammary Letha, Left Endoscopic Saphenous Vein Letha, on Cardiopulmonary Bypass, Transesophageal Echocardiogram by Anesthesia       Diagnosis: CAD (coronary artery disease) [I25.10]  Diagnosis Additional Information: No value filed.    Anesthesia Type:   General     Note:    Oropharynx: endotracheal tube in place, oral gastic tube in place and ventilatory support  Level of Consciousness: iatrogenic sedation    Level of Supplemental Oxygen (L/min / FiO2): 50  Independent Airway: airway patency not satisfactory and stable    Vital Signs Stable: post-procedure vital signs reviewed and stable  Report to RN Given: handoff report given  Patient transferred to: ICU  Comments: ICU Handoff: Call for PAUSE to initiate/utilize ICU HANDOFF, Identified Patient, Identified Responsible Provider, Reviewed the Pertinent Medical History, Discussed Surgical Course, Reviewed Intra-OP Anesthesia Management and Issues during Anesthesia, Set Expectations for Post Procedure Period and Allowed Opportunity for Questions and Acknowledgement of Understanding   ICU Handoff: Call for PAUSE to initiate/utilize ICU HANDOFF, Identified Patient, Identified Responsible Provider, Reviewed the Pertinent Medical History, Discussed Surgical Course, Reviewed Intra-OP Anesthesia Management and Issues during Anesthesia, Set Expectations for Post Procedure Period and Allowed Opportunity for Questions and Acknowledgement of Understanding      Vitals:  Vitals Value Taken Time   /81 06/02/25 13:37   Temp     Pulse 66 06/02/25 13:43   Resp     SpO2 100 % 06/02/25 13:43   Vitals shown include unfiled device data.    Electronically Signed By: Lynsey Malik MD  June 2, 2025  1:43 PM

## 2025-06-02 NOTE — OR NURSING
Device RN paged regarding pt's ICD implant prior to CABG today, device RN at bedside in pre-op, turned off ICD, will monitor patient until transfer into OR.

## 2025-06-02 NOTE — H&P
CV ICU H&P  06/02/2025        ASSESSMENT: Cesar Rashid is a 74 year old male with PMH of ESRD (on PD) and getting evaluated for kidney txp, HTN, inferior STEMI 2023, VF cardiac arrest 2023 s/p ICD placement, ANCA vasculitis . Presents to [facility] for CABG x 3 by Dr. Huerta on 6/2/2025.    CO-MORBIDITIES:   Patient Active Problem List   Diagnosis    ESRD on peritoneal dialysis (H)    Acute renal failure    Acute ST elevation myocardial infarction (STEMI) of inferior wall (H)    Anemia due to chronic kidney disease    Atherosclerosis of coronary artery    Bladder mass    Bradycardia, sinus    Dyslipidemia    End stage renal disease (H)    History of COVID-19    Ischemic cardiomyopathy    Malignant neoplasm of lateral wall of urinary bladder (H)    Pulmonary nodule    Procedure and treatment not carried out because of patient's decision for reasons of belief and group pressure    Status post right hip replacement    Stented coronary artery    VF (ventricular fibrillation) (H)    ANCA-associated vasculitis (H)    HTN (hypertension)    Status post coronary angiogram    Coronary artery disease involving native coronary artery of native heart without angina pectoris    Iron deficiency anemia secondary to inadequate dietary iron intake         Intra-op:   - Arrived @ 1330   - EBL: 1000 mL // Cell-saver: 170 // Blood products: None // Kcentra: None // Fibryga: None   - UOP: 0 // Given 1911 IVF throughout case  - Hemodynamic goals post-op: SBP <130, MAP>65         PLAN:  Neuro/ pain/ sedation:  - Monitor neurological status. Notify the MD for any acute changes in exam.    # Acute postoperative pain  - Scheduled: Tylenol  - PRN: Tylenol, oxycodone, Dilaudid    # Sedation while on mechanical ventilation   - Gtt: Propofol   - Wean for RASS goal 0 to -1     Pulmonary care:   # Post-operative mechanical ventilation   FiO2 (%): 50 %, Resp: 16, Vent Mode: VC/AC, Resp Rate (Set): 12 breaths/min, Tidal Volume (Set, mL): 460 mL,  PEEP (cm H2O): 5 cmH2O, Resp Rate (Set): 12 breaths/min, Tidal Volume (Set, mL): 460 mL, PEEP (cm H2O): 5 cmH2O   - Goal SpO2 > 92%  - CXR on arrival, then daily   - Monitor CT output   - Wean vent as able; Trend ABG's. Plan for fast track to extubation if tolerated today.     Cardiovascular:    # Cardiogenic shock  # Hx of V fib arrest s/p ICD 2023  # HTN, PTA   # Severe multi-vessel CAD s/p CABG x 3   # Hx of STEMI s/p SURESH to RCA and RPLA   AICD is set at 40, V is set at 30. Underlying rhythm.   Pre CPBP: Good biventricular function  Post CPBP: same  - Monitor hemodynamics closely  - Goal MAP >65, SBP <130   -- Hold PTA BB  - Hold Statin   - Hold BB   - ASA: start tomorrow  - Cap TPW when able post-op     GI care / Nutrition:   # At risk for protein calorie malnutrition   - NPO, bedside swallow eval once extubated  - PPI  - Last BM: PTA. Bowel regimen: MiraLAX, senna    Renal / Fluids / Electrolytes:   # Hypovolemia  # ESRD on PD   BL creat appears to be ~ 11-14   - Strict I/O, daily weights, avoid/limit nephrotoxins  - Replete lytes PRN per protocol  - Trend lactate   -Renal medicine consult for patient hx of peritoneal dialysis  -Recheck BMP 1800    Endocrine:    # Stress hyperglycemia  Preop A1c 4.9  - Insulin gtt  - Goal BG <180 for optimal healing    ID / Antibiotics:  # Stress induced leukocytosis  - To complete perioperative regimen  - Monitor fever curve, WBC, and inflammatory markers as appropriate    Heme:     # Acute blood loss anemia  # Acute thrombocytopenia  #Scientology  Does not accept blood products. Okay for synthetics including fibryga and kcentra.   No s/sx active bleeding  - Trending CBC   - Hgb goal > 7.0, transfuse PRN   - Hemoglobin 8.7  -Recheck Hgb 1800    MSK / Skin:  # Sternotomy    # Surgical Incision  - Sternal precautions, postop incision management protocol  - PT/OT/CR    Prophylaxis:     - Mechanical DVT ppx  - Chemical DVT ppx: start SQH tomorrow  - PPI    Lines / Tubes /  Drains:  - ETT  - RIJ CVC  - L radial Arterial Line  - CTs x3  - Wyatt  - OG    Disposition:  - CVICU      Patient seen, findings and plan discussed with CVICU staff and cardiothoracic surgeons and fellow.    I spent a total of  minutes providing critical care services at the bedside, and on the critical care unit, evaluating the patient, directing care and reviewing laboratory values and radiologic reports for the patient. Billing time separate from procedural time.         Johan Roberts MD  CA-1, PGY-2      Clinically Significant Risk Factors Present on Admission         # Hyponatremia: Lowest Na = 133 mmol/L in last 2 days, will monitor as appropriate   # Hypocalcemia: Lowest iCa = 3.8 mg/dL in last 2 days, will monitor and replace as appropriate      # Coagulation Defect: INR = 1.76 (Ref range: 0.85 - 1.15) and/or PTT = 32 Seconds (Ref range: 22 - 38 Seconds), will monitor for bleeding  # Drug Induced Platelet Defect: home medication list includes an antiplatelet medication   # Hypertension: Noted on problem list     # Acute Hypoxic Respiratory Failure: Based on blood gas results. Continue supplemental oxygen as needed   # Anemia: based on hgb <11            # ICD device present    # Anemia: based on hgb <11           ====================================    HPI:   Presents to CVICU intubated and sedated.      PAST MEDICAL HISTORY:   Past Medical History:   Diagnosis Date    Acute ST elevation myocardial infarction (H)     ANCA-associated vasculitis (H)     Bladder cancer (H)     CAD (coronary artery disease)     Dyslipidemia     End stage renal disease (H)     HTN (hypertension)     ELBERT (iron deficiency anemia)     Ischemic cardiomyopathy     Malignant neoplasm of lateral wall of urinary bladder (H)     Pulmonary nodules     Transfusion of blood product refused for Jew reason     Jehova's witness       PAST SURGICAL HISTORY:   Past Surgical History:   Procedure Laterality Date    CV CORONARY ANGIOGRAM  N/A 3/20/2025    Procedure: Coronary Angiogram;  Surgeon: Pepito Helton MD;  Location:  [facility] LAB    repaired ruptured globe Right     TOTAL HIP ARTHROPLASTY Left 04/29/2014    TOTAL HIP ARTHROPLASTY Right 09/02/2014    VASCULAR SURGERY      chest port, peritoneal dialysis catheter       FAMILY HISTORY:   Family History   Problem Relation Age of Onset    Colon Cancer Mother 80    Coronary Artery Disease Mother     Diabetes Mother     Coronary Artery Disease Father     Thyroid Disease Father     Alcoholism Brother     Diabetes Brother     Anesthesia Reaction No family hx of        SOCIAL HISTORY:   Social History     Tobacco Use    Smoking status: Never     Passive exposure: Never    Smokeless tobacco: Never   Substance Use Topics    Alcohol use: Not Currently     Comment: Very little - maybe 2 drinks a year         OBJECTIVE:  1. VITAL SIGNS:   Temp:  [36.5  C (97.7  F)] 36.5  C (97.7  F)  Pulse:  [68] 68  Resp:  [16] 16  BP: (127-156)/(78-90) 127/81  FiO2 (%):  [50 %] 50 %  SpO2:  [100 %] 100 %    FiO2 (%): 50 %, Resp: 16, Vent Mode: VC/AC, Resp Rate (Set): 12 breaths/min, Tidal Volume (Set, mL): 460 mL, PEEP (cm H2O): 5 cmH2O, Resp Rate (Set): 12 breaths/min, Tidal Volume (Set, mL): 460 mL, PEEP (cm H2O): 5 cmH2O      2. INTAKE/ OUTPUT:   No intake/output data recorded.      3. PHYSICAL EXAMINATION:   Neuro: Intubated. Sedated. Unable to assess. NAD.   HEENT: Normocephalic, atraumatic. PERRL, and nonicteric.   CV: RRR on monitor, S1S2, all extremities well perfused   Respiratory: Intubated. Bilateral breath sounds.   GI: Abdomen soft, Non-distended   : Voiding with Wyatt   MSK: Unable to assess.     Skin:  Sternal incision with dressings in place clean/dry/intact  Normal color. No rashes or skin lesions.         4. INVESTIGATIONS:   Arterial Blood Gases   Recent Labs   Lab 06/02/25  1334 06/02/25  1243 06/02/25  1212 06/02/25  1147   PH 7.40 7.41 7.44 7.42   PCO2 41 41 36 41   PO2 220*  198* 512* 299*   HCO3 26 26 25 26     Complete Blood Count   Recent Labs   Lab 06/02/25  1243 06/02/25  1213 06/02/25  1212 06/02/25  1147 06/02/25  0811 05/28/25  1050   WBC  --  3.1*  --   --   --  6.9   HGB 9.7* 8.7* 9.0* 9.0*   < > 13.0*   PLT  --  65*  --   --   --  319    < > = values in this interval not displayed.     Basic Metabolic Panel  Recent Labs   Lab 06/02/25  1334 06/02/25  1243 06/02/25  1212 06/02/25  1147 06/02/25  1118 06/02/25  0629 05/28/25  1050   NA  --  134* 134* 135 134*   < > 134*   POTASSIUM  --  4.3 4.3 4.6 4.6   < > 3.3*   CHLORIDE  --   --   --   --   --   --  94*   CO2  --   --   --   --   --   --  24   BUN  --   --   --   --   --   --  39.4*   CR  --   --   --   --   --   --  10.80*   * 117* 127* 120* 111*   < > 97    < > = values in this interval not displayed.     Liver Function Tests  Recent Labs   Lab 06/02/25  1213 05/28/25  1050   AST  --  24   ALT  --  16   ALKPHOS  --  96   BILITOTAL  --  0.3   ALBUMIN  --  3.1*   INR 1.76* 1.03     Pancreatic Enzymes  No lab results found in last 7 days.  Coagulation Profile  Recent Labs   Lab 06/02/25  1213 05/28/25  1050   INR 1.76* 1.03   PTT 32 31         5. RADIOLOGY:       Recent Results (from the past 24 hours)   Echo YULIA - OR *Canceled*    Narrative    The following orders were created for panel order Echo YULIA - OR.  Procedure                               Abnormality         Status                     ---------                               -----------         ------                       Please view results for these tests on the individual orders.   Echo YULIA - OR *Canceled*    Narrative    The following orders were created for panel order Echo YULIA - OR.  Procedure                               Abnormality         Status                     ---------                               -----------         ------                       Please view results for these tests on the individual orders.   YULIA with Report    Narrative     Ritu Xiao MD     6/2/2025 12:44 PM  Perioperative YULIA Procedure Note    Staff -        Anesthesiologist:  Ubaldo Tamayo MD       Resident/Fellow: Ritu Xiao MD       Performed By: fellow  Preanesthesia Checklist:  Patient identified, IV assessed, risks and   benefits discussed, monitors and equipment assessed, procedure being   performed at surgeon's request and anesthesia consent obtained.    YULIA Probe Insertion    Probe Status PRE Insertion: NO obvious damage  Probe type:  Adult 3D  Bite block used:   Oral Airway  Insertion Technique: Jaw Lift  Insertion complications: None obvious  Billing Report:YULIA report by Anesthesiologist (See Separate Report note)  Probe Status POST Removal: NO obvious damage    YULIA Report  General Procedure Information  Images for this study have been archived.  Modalities: 2D, CW Doppler, Color flow mapping and PW Doppler  Diagnostic Indications comments: Assessment of systolic function;   assessment of valvular function..  Echocardiographic and Doppler Measurements  Right Ventricle:  Cavity size normal.    Hypertrophy not present.     Thrombus not present.    Global function normal.     Left Ventricle:  Cavity size normal.    Hypertrophy present.   Thrombus   not present.   Global Function mildly impaired.   Ejection Fraction 50%.      Ventricular Regional Function:  1- Basal Anteroseptal:  hypokinetic  2- Basal Anterior:  normal  3- Basal Anterolateral:  normal  4- Basal Inferolateral:  normal  5- Basal Inferior:  normal  6- Basal Inferoseptal:  normal  7- Mid Anteroseptal:  hypokinetic  8- Mid Anterior:  normal  9- Mid Anterolateral:  normal  10- Mid Inferolateral:  normal  11- Mid Inferior:  normal  12- Mid Inferoseptal:  hypokinetic  13- Apical Anterior:  normal  14- Apical Lateral:  normal  15- Apical Inferior:  normal  16- Apical Septal:  normal  17- [place]:  normal    Valves  Aortic Valve: Annulus normal.  Stenosis not present.  Regurgitation   absent.  Leaflets  normal.  Leaflet motions normal.    Mitral Valve: Annulus normal.  Stenosis not present.  Regurgitation +1.    Leaflets normal.  Leaflet motions normal.    Tricuspid Valve: Annulus normal.  Stenosis not present.  Regurgitation +1.    Leaflets normal.  Leaflet motions normal.    Pulmonic Valve: Annulus normal.  Stenosis not present.  Regurgitation +1.        Aorta: Ascending Aorta: Size normal.  Dissection not present.  Plaque   thickness less than 3 mm.  Mobile plaque not present.    Aortic Arch: Size normal.    Dissection not present.   Plaque thickness   less than 3 mm.   Mobile plaque not present.    Descending Aorta: Size normal.    Dissection not present.   Plaque   thickness less than 3 mm.   Mobile plaque not present.      Right Atrium:  Size normal.   Spontaneous echo contrast not present.     Thrombus not present.   Tumor not present.   Device not present.     Left Atrium: Size normal.  Spontaneous echo contrast not present.    Thrombus not present.  Tumor not present.  Device not present.    Left atrial appendage normal.     Atrial Septum: Intra-atrial septal morphology normal.       Ventricular Septum: Intra-ventricular septum morphology normal.        Other Findings:   Pericardium:  normal. Pleural Effusion:  none. Pulmonary Arteries:    normal. Pulmonary Venous Flow:  normal. Cornoary sinus catheter present.   Coronary sinus size (mm):  8.   Post Intervention Findings  Procedure(s) performed:  CABG. Global function:  Improved. Regional wall   motion: Improved. Surgeon(s) notified of all postintervention findings:   Yes (In real time).                 Echocardiogram Comments  Echocardiogram comments:   PRE-PROCEDURE:  Intraoperative YULIA -- 74 year old male undergoing multivessel CABG:  At time of assessment, patient was under general anesthesia.   Rhythm: NSR / SB  Aorta: No aneurysm, no dissection, no mobile plaque.  LV: Normal chamber size. +LVH. Mildly reduced systolic function, LVEF   45-50% by  qualitative assessment with hypokinesis of the basal and mid-pap   anteroseptal walls.  RV: Normal chamber size. Preserved systolic function.  Trace MR. Trace TR. Trace PI.  Pleura: No pleural effusion.  No evidence of PFO by color flow doppler. HARPAL without thrombus by color   flow doppler and exit velocity.  All findings communicated to surgical team.       POST-PROCEDURE:  S/p CABG x3      Rhythm: NSR  LV: Improved systolic function (LVEF 55-60%), improvement of previous   RWMA.  RV: Unchanged, preserved function.  Valves: No interval valvulopathies. Trace MR. Trace TR. Trace PI.  Aorta: No interval changes. No evidence of aortic dissection s/p   decannulation.  Pleura: No interval pleural effusion.  The remainder of the post-intervention echocardiographic exam was   unchanged from baseline. All findings discussed with surgical team.   Cardiac Device Check - Inpatient   Result Value    Date Time Interrogation Session [id number]    Implantable Pulse Generator  Medtronic    Implantable Pulse Generator Model [id number] Cobalt XT VR MRI    Implantable Pulse Generator Serial Number [id number]    Type Interrogation Session In Clinic    Clinic Name [facility]    Implantable Pulse Generator Type Defibrillator    Implantable Pulse Generator Implant Date 20230911    Implantable Lead  Medtronic    Implantable Lead Model 6935M Sprint Quattro Secure S    Implantable Lead Serial Number [id number]    Implantable Lead Implant Date 20230911    Implantable Lead Polarity Type Tripolar Lead    Implantable Lead Location Detail 1 UNKNOWN    Implantable Lead Special Function 62 CM    Implantable Lead Location Right Ventricle    Implantable Lead Connection Status Connected    Flo Setting Mode (NBG Code) VVI    Flo Setting Lower Rate Limit 40    Flo Setting Hysterisis Rate Off    Lead Channel Setting Sensing Sensitivity Off    Lead Channel Setting Sensing Polarity Bipolar    Lead  Channel Setting Sensing Anode Location Right Ventricle    Lead Channel Setting Sensing Anode Terminal Ring    Lead Channel Setting Sensing Cathode Location Right Ventricle    Lead Channel Setting Sensing Cathode Terminal Tip    Lead Channel Setting Sensing Sensitivity 0.45    Lead Channel Setting Pacing Polarity Bipolar    Lead Channel Setting Pacing Anode Location Right Ventricle    Lead Channel Setting Pacing Anode Terminal Ring    Lead Channel Setting Sensing Cathode Location Right Ventricle    Lead Channel Setting Sensing Cathode Terminal Tip    Lead Channel Setting Pacing Pulse Width 0.4    Lead Channel Setting Pacing Amplitude 2.0    Lead Channel Setting Pacing Capture Mode Adaptive    Zone Setting Type Category VF    Zone Setting Vendor Type Category VF    Zone Setting Status Inactive    Zone Setting Detection Interval 320    Zone Setting Detection Beats Numerator 30    Zone Setting Detection Beats Denominator 40    Zone Setting Type Category VT    Zone Setting Vendor Type Category FastVT    Zone Setting Status Inactive    Zone Setting Detection Interval 240    Zone Setting Type Category VT    Zone Setting Vendor Type Category VT    Zone Setting Status Inactive    Zone Setting Detection Interval 370    Zone Setting Detection Beats Numerator 20    Zone Setting Detection Beats Denominator 20    Zone Setting Type Category VT    Zone Setting Vendor Type Category MonVT    Zone Setting Status Inactive    Zone Setting Detection Interval 400    Zone Setting Detection Beats Numerator 32    Zone Setting Detection Beats Denominator 32    Lead Channel Impedance Value 304    Lead Channel Impedance Value 361    Lead Channel Sensing Intrinsic Amplitude 7.4    Lead Channel Pacing Threshold Amplitude 1.0    Lead Channel Pacing Threshold Pulse Width 0.4    Lead Channel Pacing Threshold Amplitude 1.0    Lead Channel Pacing Threshold Pulse Width 0.4    Battery Date Time of Measurements 14494319110442    Battery RRT Trigger 2.8 V     Battery Remaining Longevity 146    Battery Voltage 3.02    Capacitor Charge Type Shock    Capacitor Last Charge Date Time 74807691471124    Capacitor Charge Time 3.9    Capacitor Charge Energy 18.0    Flo Statistic Date Time Start 20231010112825    Flo Statistic Date Time End 47736794853512    Flo Statistic RV Percent Paced 0.01    Atrial Tachy Statistic Date Time Start 20231010112825    Atrial Tachy Statistic Date Time End 20250602070955    Atrial Tachy Statistic AT/AF [place] Percent 0    Therapy Statistic Recent Shocks Delivered 0    Therapy Statistic Recent Shocks Aborted 0    Therapy Statistic Recent ATP Delivered 0    Therapy Statistic Recent Date Time Start 20231010112825    Therapy Statistic Recent Date Time End 86424292367087    Therapy Statistic Total Shocks Delivered 0    Therapy Statistic Total Shocks Aborted 0    Therapy Statistic Total ATP Delivered 0    Therapy Statistic Total  Date Time Start 82150218791086    Therapy Statistic Total  Date Time End 48599442738393    Episode Statistic Recent Count 0    Episode Statistic Type Category AT/AF    Episode Statistic Recent Count 0    Episode Statistic Type Category Patient Activated    Episode Statistic Recent Count 0    Episode Statistic Type Category SVT    Episode Statistic Recent Count 0    Episode Statistic Type Category VT    Episode Statistic Recent Count 0    Episode Statistic Type Category VF    Episode Statistic Recent Count 0    Episode Statistic Type Category VT    Episode Statistic Recent Count 0    Episode Statistic Type Category VT    Episode Statistic Recent Count 0    Episode Statistic Type Category VT    Episode Statistic Recent Date Time Start 20231010112825    Episode Statistic Recent Date Time End 85615375242350    Episode Statistic Recent Date Time Start 88061775949179    Episode Statistic Recent Date Time End 04379876445467    Episode Statistic Recent Date Time Start 56518842647078    Episode Statistic Recent Date Time End  54302668305022    Episode Statistic Recent Date Time Start 29639350553525    Episode Statistic Recent Date Time End 33057665978440    Episode Statistic Recent Date Time Start 65297117178734    Episode Statistic Recent Date Time End 06210097047342    Episode Statistic Recent Date Time Start 04466836956559    Episode Statistic Recent Date Time End 58121837304262    Episode Statistic Recent Date Time Start 60604923902066    Episode Statistic Recent Date Time End 60976409443978    Episode Statistic Recent Date Time Start 99605585162582    Episode Statistic Recent Date Time End 81714110041976    Episode Statistic Total Count 0    Episode Statistic Type Category Patient Activated    Episode Statistic Total Count 0    Episode Statistic Type Category SVT    Episode Statistic Total Count 0    Episode Statistic Type Category VT    Episode Statistic Total Count 0    Episode Statistic Type Category VF    Episode Statistic Total Count 0    Episode Statistic Type Category VT    Episode Statistic Total Count 0    Episode Statistic Type Category VT    Episode Statistic Total Count 0    Episode Statistic Type Category VT    Episode Statistic Total Date Time Start 55165126968101    Episode Statistic Total Date Time End 11451538959423    Episode Statistic Total Date Time Start 66731659875496    Episode Statistic Total Date Time End 72067714845641    Episode Statistic Total Date Time Start 45995952724676    Episode Statistic Total Date Time End 92935080613557    Episode Statistic Total Date Time Start 76311097760962    Episode Statistic Total Date Time End 97434323131401    Episode Statistic Total Date Time Start 86037626765092    Episode Statistic Total Date Time End 87890635198603    Episode Statistic Total Date Time Start 36088101684891    Episode Statistic Total Date Time End 44118575286303    Episode Statistic Total Date Time Start 77274612632239    Episode Statistic Total Date Time End 21732035644545    Narrative    Patient seen  on 3C pre-op for evaluation and iterative programming of their ICD per MD orders.     Device: Medtronic [id number] [place] XT VR MRI  Normal device function  Mode: VVI 40 bpm  : <0.1%  Intrinsic rhythm: VS @ 61 bpm  Thoracic Impedance: Near reference line  Short V-V intervals: 0  Lead Trends Appear Stable: Yes  Estimated battery longevity to RRT = 12.1 years. Battery voltage = 3.03 V.   Atrial Arrhythmia: 0  Ventricular Arrhythmia: 0  Setting Changes: ICD tachy therapies programmed off per Anesthesia MD orders.  CASSI Blackwell RN    Single lead ICD    I have reviewed and interpreted the device interrogation, settings, programming and nurse's summary. The device is functioning within normal device parameters. I agree with the current findings, assessment and plan.       =========================================

## 2025-06-02 NOTE — PROGRESS NOTES
Pt CV Post Op fast track extubation.  Pt suctioned orally and through ETT - had a positive cuff leak.  Bilateral breath sounds and on 2L oxymask saturations at 100%.

## 2025-06-02 NOTE — ANESTHESIA PROCEDURE NOTES
Arterial Line Procedure Note    Pre-Procedure   Staff -        Anesthesiologist:  Ubaldo Tamayo MD       Resident/Fellow: Lynsey Malik MD       Performed By: resident       Location: OR       Pre-Anesthestic Checklist: patient identified, IV checked, risks and benefits discussed, informed consent, monitors and equipment checked, pre-op evaluation and at physician/surgeon's request  Timeout:       Correct Patient: Yes        Correct Procedure: Yes        Correct Site: Yes        Correct Position: Yes   Line Placement:   This line was placed Pre Induction starting at 6/2/2025 7:34 AM and ending at 6/2/2025 7:43 AM  Procedure   Procedure: arterial line       Laterality: left       Insertion Site: radial.  Sterile Prep        Standard elements of sterile barrier followed       Skin prep: Chloraprep  Insertion/Injection        Technique: ultrasound guided        Medical Necessity: atherosclerosis, need to minimize puncture attempts and calcified vessel       1. Ultrasound was used to evaluate the access site.       2. Artery evaluated via ultrasound for patency/adequacy.       3. Using real-time ultrasound the needle/catheter was observed entering the artery/vein.       5. The visualized structures were anatomically normal.       Catheter Type/Size: 20 G, 1.75 in/4.5 cm quick cath (integral wire)  Narrative         Secured by: suture and anchor securement device       Tegaderm dressing used.       Complications: None apparent,        Arterial waveform: Yes        IBP within 10% of NIBP: Yes

## 2025-06-02 NOTE — BRIEF OP NOTE
[facility]    Brief Operative Note    Pre-operative diagnosis: CAD (coronary artery disease) [I25.10]  Post-operative diagnosis Same as pre-operative diagnosis    Procedure: Median Sternotomy, CORONARY ARTERY BYPASS GRAFT x 3, Left Internal Mammary Pioneer, Left Endoscopic Saphenous Vein Pioneer, on Cardiopulmonary Bypass, Transesophageal Echocardiogram by Anesthesia, N/A - Chest    Surgeon: Surgeons and Role:     * Ridge Huerta MD - Primary     * Zafar Allan MD - Fellow - Assisting     * Dana Davis PA-C  Anesthesia: General   Estimated Blood Loss: 1000ml    Drains:  1x left pleural, 2x Mediastinal CT    Specimens: * No specimens in log *  Findings:   CABG 3v, LIMA to LAD, SVG to OM, PDA.    Complications: None.  Implants: * No implants in log *

## 2025-06-02 NOTE — ANESTHESIA PROCEDURE NOTES
Perioperative YULIA Procedure Note    Staff -        Anesthesiologist:  Ubaldo Tamayo MD       Resident/Fellow: Ritu Xiao MD       Performed By: fellow  Preanesthesia Checklist:  Patient identified, IV assessed, risks and benefits discussed, monitors and equipment assessed, procedure being performed at surgeon's request and anesthesia consent obtained.    YULIA Probe Insertion    Probe Status PRE Insertion: NO obvious damage  Probe type:  Adult 3D  Bite block used:   Oral Airway  Insertion Technique: Jaw Lift  Insertion complications: None obvious  Billing Report:YULIA report by Anesthesiologist (See Separate Report note)  Probe Status POST Removal: NO obvious damage    YULIA Report  General Procedure Information  Images for this study have been archived.  Modalities: 2D, CW Doppler, Color flow mapping and PW Doppler  Diagnostic Indications comments: Assessment of systolic function; assessment of valvular function..  Echocardiographic and Doppler Measurements  Right Ventricle:  Cavity size normal.    Hypertrophy not present.   Thrombus not present.    Global function normal.     Left Ventricle:  Cavity size normal.    Hypertrophy present.   Thrombus not present.   Global Function mildly impaired.   Ejection Fraction 50%.      Ventricular Regional Function:  1- Basal Anteroseptal:  hypokinetic  2- Basal Anterior:  normal  3- Basal Anterolateral:  normal  4- Basal Inferolateral:  normal  5- Basal Inferior:  normal  6- Basal Inferoseptal:  normal  7- Mid Anteroseptal:  hypokinetic  8- Mid Anterior:  normal  9- Mid Anterolateral:  normal  10- Mid Inferolateral:  normal  11- Mid Inferior:  normal  12- Mid Inferoseptal:  hypokinetic  13- Apical Anterior:  normal  14- Apical Lateral:  normal  15- Apical Inferior:  normal  16- Apical Septal:  normal  17- Foster:  normal    Valves  Aortic Valve: Annulus normal.  Stenosis not present.  Regurgitation absent.  Leaflets normal.  Leaflet motions normal.    Mitral Valve: Annulus  normal.  Stenosis not present.  Regurgitation +1.  Leaflets normal.  Leaflet motions normal.    Tricuspid Valve: Annulus normal.  Stenosis not present.  Regurgitation +1.  Leaflets normal.  Leaflet motions normal.    Pulmonic Valve: Annulus normal.  Stenosis not present.  Regurgitation +1.      Aorta: Ascending Aorta: Size normal.  Dissection not present.  Plaque thickness less than 3 mm.  Mobile plaque not present.    Aortic Arch: Size normal.    Dissection not present.   Plaque thickness less than 3 mm.   Mobile plaque not present.    Descending Aorta: Size normal.    Dissection not present.   Plaque thickness less than 3 mm.   Mobile plaque not present.      Right Atrium:  Size normal.   Spontaneous echo contrast not present.   Thrombus not present.   Tumor not present.   Device not present.     Left Atrium: Size normal.  Spontaneous echo contrast not present.  Thrombus not present.  Tumor not present.  Device not present.    Left atrial appendage normal.     Atrial Septum: Intra-atrial septal morphology normal.       Ventricular Septum: Intra-ventricular septum morphology normal.        Other Findings:   Pericardium:  normal. Pleural Effusion:  none. Pulmonary Arteries:  normal. Pulmonary Venous Flow:  normal. Cornoary sinus catheter present. Coronary sinus size (mm):  8.   Post Intervention Findings  Procedure(s) performed:  CABG. Global function:  Improved. Regional wall motion: Improved. Surgeon(s) notified of all postintervention findings: Yes (In real time).                 Echocardiogram Comments  Echocardiogram comments:   PRE-PROCEDURE:  Intraoperative YULIA -- 74 year old male undergoing multivessel CABG:  At time of assessment, patient was under general anesthesia.   Rhythm: NSR / SB  Aorta: No aneurysm, no dissection, no mobile plaque.  LV: Normal chamber size. +LVH. Mildly reduced systolic function, LVEF 45-50% by qualitative assessment with hypokinesis of the basal and mid-pap anteroseptal walls.  RV:  Normal chamber size. Preserved systolic function.  Trace MR. Trace TR. Trace PI.  Pleura: No pleural effusion.  No evidence of PFO by color flow doppler. HARPAL without thrombus by color flow doppler and exit velocity.  All findings communicated to surgical team.       POST-PROCEDURE:  S/p CABG x3      Rhythm: NSR  LV: Improved systolic function (LVEF 55-60%), improvement of previous RWMA.  RV: Unchanged, preserved function.  Valves: No interval valvulopathies. Trace MR. Trace TR. Trace PI.  Aorta: No interval changes. No evidence of aortic dissection s/p decannulation.  Pleura: No interval pleural effusion.  The remainder of the post-intervention echocardiographic exam was unchanged from baseline. All findings discussed with surgical team.

## 2025-06-02 NOTE — PROGRESS NOTES
CLINICAL NUTRITION SERVICES - BRIEF NOTE    Received provider consult for nutrition education with comments post op cardiovascular surgery (automatic consult on post-op order set). S/p CABGx3 on 6/2/2025. Nutrition education to be completed as able/appropriate (as pt s/p CABG and/or initial VAD).    RD will follow per LOS protocol or if re-consulted.      Selina Garcia RD, LD, CNSC  Float Coverage  Available on [facility]

## 2025-06-02 NOTE — PROGRESS NOTES
RT CV Post-op Extubation Plan    ETT Cuffed 8 mm-Secured at (cm): 25 cm cm at teeth/gums    Initial vent settings:  Vent Mode: (Volume Control-Assist Control)  Resp Rate (Set): 12 breaths/min  Tidal Volume (Set, mL): 460 mL   FiO2: 100 %   PEEP (cm H2O): 5 cmH2O     Is patient fast track? Yes    Patient out of OR time: 1320  **Extubation goal is within 6 hours after leaving the OR**      Patient meets all criteria for wean as outlined in policy: Yes      Summary of weaning attempts in first 24 hours (Patients to be placed on CPAP/Pressure Support of 5/5):  **Weaning to be between 30 min and 60 min**  **Contact provider if there is a concern over extubation**    Start: 1607  End: 1705  Was patient extubated? Yes

## 2025-06-03 ENCOUNTER — APPOINTMENT (OUTPATIENT)
Dept: OCCUPATIONAL THERAPY | Facility: CLINIC | Age: 75
DRG: 235 | End: 2025-06-03
Attending: SURGERY
Payer: COMMERCIAL

## 2025-06-03 ENCOUNTER — APPOINTMENT (OUTPATIENT)
Dept: GENERAL RADIOLOGY | Facility: CLINIC | Age: 75
End: 2025-06-03
Attending: SURGERY
Payer: COMMERCIAL

## 2025-06-03 LAB
ALBUMIN SERPL BCG-MCNC: 3 G/DL (ref 3.5–5.2)
ALP SERPL-CCNC: 69 U/L (ref 40–150)
ALT SERPL W P-5'-P-CCNC: 6 U/L (ref 0–70)
ANION GAP SERPL CALCULATED.3IONS-SCNC: 19 MMOL/L (ref 7–15)
AST SERPL W P-5'-P-CCNC: 28 U/L (ref 0–45)
ATRIAL RATE - MUSE: 63 BPM
ATRIAL RATE - MUSE: 73 BPM
BILIRUB SERPL-MCNC: 0.3 MG/DL
BUN SERPL-MCNC: 42.8 MG/DL (ref 8–23)
CA-I BLD-MCNC: 4.5 MG/DL (ref 4.4–5.2)
CALCIUM SERPL-MCNC: 8.9 MG/DL (ref 8.8–10.4)
CHLORIDE SERPL-SCNC: 97 MMOL/L (ref 98–107)
CREAT SERPL-MCNC: 10.4 MG/DL (ref 0.67–1.17)
DIASTOLIC BLOOD PRESSURE - MUSE: NORMAL MMHG
DIASTOLIC BLOOD PRESSURE - MUSE: NORMAL MMHG
EGFRCR SERPLBLD CKD-EPI 2021: 5 ML/MIN/1.73M2
ERYTHROCYTE [DISTWIDTH] IN BLOOD BY AUTOMATED COUNT: 16.9 % (ref 10–15)
GLUCOSE BLDC GLUCOMTR-MCNC: 100 MG/DL (ref 70–99)
GLUCOSE BLDC GLUCOMTR-MCNC: 112 MG/DL (ref 70–99)
GLUCOSE BLDC GLUCOMTR-MCNC: 113 MG/DL (ref 70–99)
GLUCOSE BLDC GLUCOMTR-MCNC: 116 MG/DL (ref 70–99)
GLUCOSE BLDC GLUCOMTR-MCNC: 118 MG/DL (ref 70–99)
GLUCOSE BLDC GLUCOMTR-MCNC: 129 MG/DL (ref 70–99)
GLUCOSE BLDC GLUCOMTR-MCNC: 130 MG/DL (ref 70–99)
GLUCOSE BLDC GLUCOMTR-MCNC: 137 MG/DL (ref 70–99)
GLUCOSE BLDC GLUCOMTR-MCNC: 161 MG/DL (ref 70–99)
GLUCOSE SERPL-MCNC: 112 MG/DL (ref 70–99)
HCO3 SERPL-SCNC: 19 MMOL/L (ref 22–29)
HCT VFR BLD AUTO: 33.8 % (ref 40–53)
HGB BLD-MCNC: 10.9 G/DL (ref 13.3–17.7)
INTERPRETATION ECG - MUSE: NORMAL
INTERPRETATION ECG - MUSE: NORMAL
MAGNESIUM SERPL-MCNC: 3 MG/DL (ref 1.7–2.3)
MCH RBC QN AUTO: 30.8 PG (ref 26.5–33)
MCHC RBC AUTO-ENTMCNC: 32.2 G/DL (ref 31.5–36.5)
MCV RBC AUTO: 96 FL (ref 78–100)
P AXIS - MUSE: 39 DEGREES
P AXIS - MUSE: 47 DEGREES
PHOSPHATE SERPL-MCNC: 5.7 MG/DL (ref 2.5–4.5)
PLATELET # BLD AUTO: 227 10E3/UL (ref 150–450)
POTASSIUM SERPL-SCNC: 4.9 MMOL/L (ref 3.4–5.3)
PR INTERVAL - MUSE: 200 MS
PR INTERVAL - MUSE: 206 MS
PROT SERPL-MCNC: 5.2 G/DL (ref 6.4–8.3)
QRS DURATION - MUSE: 104 MS
QRS DURATION - MUSE: 104 MS
QT - MUSE: 450 MS
QT - MUSE: 498 MS
QTC - MUSE: 495 MS
QTC - MUSE: 509 MS
R AXIS - MUSE: -10 DEGREES
R AXIS - MUSE: 48 DEGREES
RBC # BLD AUTO: 3.54 10E6/UL (ref 4.4–5.9)
SODIUM SERPL-SCNC: 135 MMOL/L (ref 135–145)
SYSTOLIC BLOOD PRESSURE - MUSE: NORMAL MMHG
SYSTOLIC BLOOD PRESSURE - MUSE: NORMAL MMHG
T AXIS - MUSE: 3 DEGREES
T AXIS - MUSE: 9 DEGREES
VENTRICULAR RATE- MUSE: 63 BPM
VENTRICULAR RATE- MUSE: 73 BPM
WBC # BLD AUTO: 7.3 10E3/UL (ref 4–11)

## 2025-06-03 PROCEDURE — 71045 X-RAY EXAM CHEST 1 VIEW: CPT

## 2025-06-03 PROCEDURE — 5A1221Z PERFORMANCE OF CARDIAC OUTPUT, CONTINUOUS: ICD-10-PCS | Performed by: SURGERY

## 2025-06-03 PROCEDURE — 021109W BYPASS CORONARY ARTERY, TWO ARTERIES FROM AORTA WITH AUTOLOGOUS VENOUS TISSUE, OPEN APPROACH: ICD-10-PCS | Performed by: SURGERY

## 2025-06-03 PROCEDURE — 97165 OT EVAL LOW COMPLEX 30 MIN: CPT | Mod: GO

## 2025-06-03 PROCEDURE — 250N000013 HC RX MED GY IP 250 OP 250 PS 637: Performed by: PHYSICIAN ASSISTANT

## 2025-06-03 PROCEDURE — 999N000090 HC STATISTIC LEVEL 2 EST PATIENT

## 2025-06-03 PROCEDURE — 999N000128 HC STATISTIC PERIPHERAL IV START W/O US GUIDANCE

## 2025-06-03 PROCEDURE — 250N000013 HC RX MED GY IP 250 OP 250 PS 637: Performed by: SURGERY

## 2025-06-03 PROCEDURE — 82374 ASSAY BLOOD CARBON DIOXIDE: CPT | Performed by: SURGERY

## 2025-06-03 PROCEDURE — 84100 ASSAY OF PHOSPHORUS: CPT | Performed by: SURGERY

## 2025-06-03 PROCEDURE — 83735 ASSAY OF MAGNESIUM: CPT | Performed by: SURGERY

## 2025-06-03 PROCEDURE — 99231 SBSQ HOSP IP/OBS SF/LOW 25: CPT | Mod: 24 | Performed by: STUDENT IN AN ORGANIZED HEALTH CARE EDUCATION/TRAINING PROGRAM

## 2025-06-03 PROCEDURE — 3E1M39Z IRRIGATION OF PERITONEAL CAVITY USING DIALYSATE, PERCUTANEOUS APPROACH: ICD-10-PCS | Performed by: SURGERY

## 2025-06-03 PROCEDURE — 120N000003 HC R&B IMCU UMMC

## 2025-06-03 PROCEDURE — 250N000011 HC RX IP 250 OP 636: Performed by: SURGERY

## 2025-06-03 PROCEDURE — 82330 ASSAY OF CALCIUM: CPT | Performed by: SURGERY

## 2025-06-03 PROCEDURE — 82310 ASSAY OF CALCIUM: CPT | Performed by: SURGERY

## 2025-06-03 PROCEDURE — 02100Z9 BYPASS CORONARY ARTERY, ONE ARTERY FROM LEFT INTERNAL MAMMARY, OPEN APPROACH: ICD-10-PCS | Performed by: SURGERY

## 2025-06-03 PROCEDURE — 99233 SBSQ HOSP IP/OBS HIGH 50: CPT | Mod: 24 | Performed by: INTERNAL MEDICINE

## 2025-06-03 PROCEDURE — 97530 THERAPEUTIC ACTIVITIES: CPT | Mod: GO

## 2025-06-03 PROCEDURE — 250N000011 HC RX IP 250 OP 636: Mod: JZ | Performed by: PHYSICIAN ASSISTANT

## 2025-06-03 PROCEDURE — 06BQ4ZZ EXCISION OF LEFT SAPHENOUS VEIN, PERCUTANEOUS ENDOSCOPIC APPROACH: ICD-10-PCS | Performed by: SURGERY

## 2025-06-03 PROCEDURE — 999N000157 HC STATISTIC RCP TIME EA 10 MIN

## 2025-06-03 PROCEDURE — 250N000013 HC RX MED GY IP 250 OP 250 PS 637: Performed by: STUDENT IN AN ORGANIZED HEALTH CARE EDUCATION/TRAINING PROGRAM

## 2025-06-03 PROCEDURE — 85014 HEMATOCRIT: CPT | Performed by: SURGERY

## 2025-06-03 PROCEDURE — 71045 X-RAY EXAM CHEST 1 VIEW: CPT | Mod: 26 | Performed by: RADIOLOGY

## 2025-06-03 PROCEDURE — 94660 CPAP INITIATION&MGMT: CPT

## 2025-06-03 PROCEDURE — 272N000004 HC RX 272: Performed by: STUDENT IN AN ORGANIZED HEALTH CARE EDUCATION/TRAINING PROGRAM

## 2025-06-03 RX ORDER — METHOXY POLYETHYLENE GLYCOL-EPOETIN BETA 200 UG/.3ML
0.6 INJECTION, SOLUTION INTRAVENOUS ONCE
Status: ON HOLD | COMMUNITY

## 2025-06-03 RX ORDER — NICOTINE POLACRILEX 4 MG
15-30 LOZENGE BUCCAL
Status: DISCONTINUED | OUTPATIENT
Start: 2025-06-03 | End: 2025-06-03

## 2025-06-03 RX ORDER — GENTAMICIN SULFATE 1 MG/G
CREAM TOPICAL DAILY PRN
Status: DISCONTINUED | OUTPATIENT
Start: 2025-06-03 | End: 2025-06-04

## 2025-06-03 RX ORDER — ROSUVASTATIN CALCIUM 10 MG/1
10 TABLET, COATED ORAL DAILY
Status: DISPENSED | OUTPATIENT
Start: 2025-06-03

## 2025-06-03 RX ORDER — CARVEDILOL 3.12 MG/1
3.12 TABLET ORAL 2 TIMES DAILY WITH MEALS
Status: DISCONTINUED | OUTPATIENT
Start: 2025-06-03 | End: 2025-06-04

## 2025-06-03 RX ORDER — DEXTROSE MONOHYDRATE 25 G/50ML
25-50 INJECTION, SOLUTION INTRAVENOUS
Status: DISCONTINUED | OUTPATIENT
Start: 2025-06-03 | End: 2025-06-03

## 2025-06-03 RX ORDER — NITROGLYCERIN 0.4 MG/1
0.4 TABLET SUBLINGUAL EVERY 5 MIN PRN
Status: ON HOLD | COMMUNITY

## 2025-06-03 RX ADMIN — ACETAMINOPHEN 975 MG: 325 TABLET, FILM COATED ORAL at 22:59

## 2025-06-03 RX ADMIN — SENNOSIDES AND DOCUSATE SODIUM 1 TABLET: 50; 8.6 TABLET ORAL at 07:56

## 2025-06-03 RX ADMIN — GENTAMICIN SULFATE: 1 CREAM TOPICAL at 14:59

## 2025-06-03 RX ADMIN — HYDRALAZINE HYDROCHLORIDE 10 MG: 20 INJECTION INTRAMUSCULAR; INTRAVENOUS at 05:56

## 2025-06-03 RX ADMIN — OXYCODONE HYDROCHLORIDE 5 MG: 5 TABLET ORAL at 09:04

## 2025-06-03 RX ADMIN — ACETAMINOPHEN 975 MG: 325 TABLET, FILM COATED ORAL at 15:46

## 2025-06-03 RX ADMIN — ROSUVASTATIN CALCIUM 10 MG: 10 TABLET, FILM COATED ORAL at 10:16

## 2025-06-03 RX ADMIN — CARVEDILOL 3.12 MG: 3.12 TABLET, FILM COATED ORAL at 18:46

## 2025-06-03 RX ADMIN — ASPIRIN 81 MG CHEWABLE TABLET 162 MG: 81 TABLET CHEWABLE at 07:56

## 2025-06-03 RX ADMIN — POLYETHYLENE GLYCOL 3350 17 G: 17 POWDER, FOR SOLUTION ORAL at 07:56

## 2025-06-03 RX ADMIN — OXYCODONE HYDROCHLORIDE 5 MG: 5 TABLET ORAL at 04:59

## 2025-06-03 RX ADMIN — LEVOTHYROXINE SODIUM 200 MCG: 0.15 TABLET ORAL at 07:56

## 2025-06-03 RX ADMIN — PANTOPRAZOLE SODIUM 40 MG: 40 TABLET, DELAYED RELEASE ORAL at 07:56

## 2025-06-03 RX ADMIN — SENNOSIDES AND DOCUSATE SODIUM 1 TABLET: 50; 8.6 TABLET ORAL at 21:08

## 2025-06-03 RX ADMIN — SODIUM CHLORIDE, SODIUM LACTATE, CALCIUM CHLORIDE, MAGNESIUM CHLORIDE AND DEXTROSE: 1.5; 538; 448; 18.3; 5.08 INJECTION, SOLUTION INTRAPERITONEAL at 14:58

## 2025-06-03 RX ADMIN — HEPARIN SODIUM 5000 UNITS: 5000 INJECTION, SOLUTION INTRAVENOUS; SUBCUTANEOUS at 21:08

## 2025-06-03 RX ADMIN — HEPARIN SODIUM 5000 UNITS: 5000 INJECTION, SOLUTION INTRAVENOUS; SUBCUTANEOUS at 12:47

## 2025-06-03 RX ADMIN — ACETAMINOPHEN 975 MG: 325 TABLET, FILM COATED ORAL at 07:56

## 2025-06-03 RX ADMIN — CARVEDILOL 3.12 MG: 3.12 TABLET, FILM COATED ORAL at 10:16

## 2025-06-03 RX ADMIN — CEFAZOLIN 1 G: 1 INJECTION, POWDER, FOR SOLUTION INTRAMUSCULAR; INTRAVENOUS at 12:48

## 2025-06-03 ASSESSMENT — ACTIVITIES OF DAILY LIVING (ADL)
ADLS_ACUITY_SCORE: 38
ADLS_ACUITY_SCORE: 36
ADLS_ACUITY_SCORE: 36
ADLS_ACUITY_SCORE: 38
ADLS_ACUITY_SCORE: 36
ADLS_ACUITY_SCORE: 38
ADLS_ACUITY_SCORE: 36
ADLS_ACUITY_SCORE: 38
ADLS_ACUITY_SCORE: 36
ADLS_ACUITY_SCORE: 38
ADLS_ACUITY_SCORE: 38
ADLS_ACUITY_SCORE: 36
ADLS_ACUITY_SCORE: 38
ADLS_ACUITY_SCORE: 36
ADLS_ACUITY_SCORE: 36
ADLS_ACUITY_SCORE: 38

## 2025-06-03 NOTE — PROGRESS NOTES
"   06/03/25 1038   Appointment Info   Signing Clinician's Name / Credentials (OT) Jesscia Torres OTR/L   Rehab Comments (OT) sternal precautions   Living Environment   People in Home spouse   Current Living Arrangements house   Home Accessibility stairs to enter home   Number of Stairs, Main Entrance 3   Stair Railings, Main Entrance none   Transportation Anticipated family or friend will provide   Living Environment Comments Pt lives with spouse in house. 3 RUDDY all needs met on main. Walk in shower with built in chair.   Self-Care   Usual Activity Tolerance excellent   Current Activity Tolerance good   Regular Exercise Yes   Activity/Exercise Type walking   Exercise Amount/Frequency daily  (1 mi)   Equipment Currently Used at Home none   Fall history within last six months no   Activity/Exercise/Self-Care Comment IND in ADLs prior to admission, walking daily ~1mi   Instrumental Activities of Daily Living (IADL)   IADL Comments Pt spouse completes cooking, cleaning and laundry tasks at baseline. 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 on PD, HTN, CAD, admitted after CABG.\"   Existing Precautions/Restrictions cardiac;fall;sternal   Left Upper Extremity (Weight-bearing Status) partial weight-bearing (PWB)  (<10 lb lifting, pushing, pulling)   Right Upper Extremity (Weight-bearing Status) partial weight-bearing (PWB)  (<10 lb lifting, pushing, pulling)   General Observations and Info Activity: Adult ICU Early Mobility   Cognitive Status Examination   Orientation Status orientation to person, place and time   Cognitive Status Comments no concerns   Visual Perception   Visual Impairment/Limitations WFL   Sensory   Sensory Quick Adds " sensation intact   Sensory Comments baseline neuropathy in feet   Pain Assessment   Patient Currently in Pain Yes, see Vital Sign flowsheet  (2/10 incision, RN aware)   Posture   Posture not impaired   Range of Motion Comprehensive   General Range of Motion bilateral upper extremity ROM WFL   Comment, General Range of Motion limited d/t precs   Strength Comprehensive (MMT)   Comment, General Manual Muscle Testing (MMT) Assessment general weakness, WFL, limited d/t precs   Coordination   Upper Extremity Coordination No deficits were identified   Bed Mobility   Comment (Bed Mobility) not assessed, up in chair upon arrival   Transfers   Transfers sit-stand transfer   Transfer Comments CGA   Balance   Balance Assessment standing dynamic balance   Standing Balance: Dynamic contact guard   Balance Comments unsteady marching in place   Activities of Daily Living   BADL Assessment/Intervention bathing;lower body dressing;grooming;toileting   Bathing Assessment/Intervention   [place] Level (Bathing) minimum assist (75% patient effort)   Comment, (Bathing) per clinical judgement   Lower Body Dressing Assessment/Training   [place] Level (Lower Body Dressing) minimum assist (75% patient effort)   Comment, (Lower Body Dressing) per clinical judgement   Grooming Assessment/Training   [place] Level (Grooming) supervision;set up   Comment, (Grooming) per clinical judgement   Toileting   [place] Level (Toileting) minimum assist (75% patient effort)   Comment, (Toileting) per clinical judgement   Clinical Impression   Criteria for Skilled Therapeutic Interventions Met (OT) Yes, treatment indicated   OT Diagnosis Dec IND in I/ADLs   OT Problem List-Impairments impacting ADL problems related to;activity tolerance impaired;balance;mobility;range of motion (ROM);pain;strength;post-surgical precautions   Assessment of Occupational Performance 3-5 Performance Deficits   Identified Performance Deficits dressing, bathing,  toileting, functional transfers and mobility   Planned Therapy Interventions (OT) ADL retraining;IADL retraining;ROM;strengthening;transfer training;home program guidelines;progressive activity/exercise   Clinical Decision Making Complexity (OT) problem focused assessment/low complexity   Risk & Benefits of therapy have been explained care plan/treatment goals reviewed;evaluation/treatment results reviewed;risks/benefits reviewed;current/potential barriers reviewed;participants included;participants voiced agreement with care plan;patient   Clinical Impression Comments Pt below functional baseline. Will benefit from continued therapy to promote strength and IND in I/ADLs   OT Total Evaluation Time   OT Eval, Low Complexity Minutes (68043) 8   OT Goals   Therapy Frequency (OT) 6 times/week   OT Predicted Duration/Target Date for Goal Attainment 06/17/25   OT Goals Upper Body Dressing;Lower Body Dressing;Lower Body Bathing;Toilet Transfer/Toileting;Home Management;Cardiac Phase 1   OT: Upper Body Dressing Independent;within precautions   OT: Lower Body Dressing Modified independent;within precautions   OT: Lower Body Bathing Modified independent;with precautions   OT: Toilet Transfer/Toileting Modified independent;within precautions   OT: Home Management Independent;with light demand household tasks;within precautions   OT: Understanding of cardiac education to maximize quality of life, condition management, and health outcomes Patient;Verbalize   OT: Perform aerobic activity with stable cardiovascular response continuous;30 minutes;ambulation   OT: Functional/aerobic ambulation tolerance with stable cardiovascular response in order to return to home and community environment Independent;Greater than 300 feet   OT: Navigation of stairs simulating home set up with stable cardiovascular response in order to return to home and community environment Independent;3 stairs   OT Discharge Planning   OT Plan review  precautions, CR folder, progress functional mobility/transfers, standing ADLs, LBD   OT Discharge Recommendation (DC Rec) home with assist;home with outpatient cardiac rehab   OT Rationale for DC Rec Pt below functional baseline. Limited by BP and post op precautions. Has good family support. Ax1 for functional transfers. Anticipate pt will be safe for return home w/ assist and OP CR.   OT Brief overview of current status CGA STS   Total Session Time   Timed Code Treatment Minutes 24   Total Session Time (sum of timed and untimed services) 32

## 2025-06-03 NOTE — PLAN OF CARE
Transfer from  at around 2pm  Family: At bedside   Belongings: Received with pt  Chart: Received with pt  2 RN Skin Assessment Completed By: Laila Piper.   Skin: scattered scabs and bruises. Surgical cites, PD site. R. Internal jugular site, R radial site, and preventative mepilex on coccyx.      Pt status: A&Ox4. VSS, afebrile. Sating >92% on RA. CT x3 to one atrium to suction. TPW VVI 30. Denied SOB, chest pain/palpitation and nausea. Mild pain at incisional site.

## 2025-06-03 NOTE — PROGRESS NOTES
PRE PERITONEAL DIALYSIS TREATMENT NOTE      Date:  6/3/2025  Time:  4:42 PM    Data:   Total Treatment Volume mL:  27330   Total Duration of Therapy hours: 12   Fill Volume mL: 2000 mL  Total Number of Cycles: 6  Heater Bag: Volume:6000 mL, Dextrose 1.5%, Ca 2.5mEq with no additives  Side Bag # 1: [id number] mL, Dextrose 1.5%, Ca 2.5mEq with no additives  Final Fill:Volume 0 mL, No Final Fill with no additives:     PD Dressing Change: Yes, Gentamicin 0.1% Cream: Yes  Weight:75.9 kg (167 lb 5.3 oz)kg Bed Scale    Assessment/Interventions:   Patient stable, VSS, Patient assessed and patient knowledgeable and is able to safely connect and disconnect PD independently.  Will continue to assess and educate patient about PD cares and therapy as needed. PD initiated, no dialysis related issues.      Plan:      Will follow per renal team. Continue to assess how patient tolerates PD therapy. PD RN available for questions at pager: [phone].

## 2025-06-03 NOTE — PROGRESS NOTES
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     ESRD on PD  ESRD 2/2 MPO ANCA vasculitis   Home dialysis unit in [place], [place]. Primary nephrologist Dr. Valdivia. Access w PD catheter.  Dry weight 74.5 kg.  First date of dialysis: 7/17/20. Last session prior to admit: evening of 6/1/25.  Outpatient PD Rx:   Total fill vol: 15.6 L  Cycler time: 12 h  Number of exchanges: 6  Fill vol: 2600 mL  No day dwell    Tonight's PD Rx:  Total fill vol: 12 L  Cycler time: 12 h  Number of exchanges: 6  Fill vol: 2000 mL  No day dwell     Anemia: hgb 10.9, ferritin 1174, Tsat 28%. On NGUYEN & IV Fe outpatient   Volume/HTN: BP appropriate, euvolemic   Acidosis: bicarb 19  MBD: Ca  8.9, phos 5.7     #CAD s/p CABG x3 (6/2/25)    Recommendations were communicated to primary team via note & vocera    Seen and discussed with Dr. Richi Alcantar MD   Division of Renal Disease and Hypertension  [facility]  beatlab Web Console    SUBJECTIVE     Interval History :   Nursing and provider notes from last 24 hours reviewed.  In the last 24 hours Cesar Rashid   Extubated yesterday, pain controlled. No major concerns    A comprehensive review of systems was negative except as noted above.    OBJECTIVE     Physical Exam:   I/O last 3 completed shifts:  In: 2993.88 [P.O.:50; I.V.:1673.88; Other:170; IV Piggyback:1100]  Out: 361 [Urine:1; Emesis/NG output:50; Chest Tube:310]   /71   Pulse 67   Temp 97.9  F (36.6  C) (Oral)   Resp 16   Ht 1.753 m (5' 9\")   Wt 75.9 kg (167 lb 5.3 oz)   SpO2 98%   BMI 24.71 kg/m       General: up in chair   HEENT: mmm  Cardiac: rrr  Pulmonary:unlabored  Abdominal: nontender  Extremities: no LE edema   Neuro: A&Ox4  Access: R internal jugular CVC    Labs:   All labs reviewed by " me  Electrolytes/Renal -   Recent Labs   Lab Test 06/03/25  0756 06/03/25  0617 06/03/25  0505 06/02/25  1820 06/02/25  1818 06/02/25  1619 06/02/25  1334   NA  --   --  135  --  135  --  135   POTASSIUM  --   --  4.9  --  4.3  --  4.3   CHLORIDE  --   --  97*  --  96*  --  97*   CO2  --   --  19*  --  19*  --  23   BUN  --   --  42.8*  --  36.5*  --  34.6*   CR  --   --  10.40*  --  9.38*  --  9.24*   * 118* 112*   < > 159*   < > 117*  114*   ISIDRO  --   --  8.9  --  9.1  --  9.1   MAG  --   --  3.0*  --  3.3*  --  3.0*   PHOS  --   --  5.7*  --  4.8*  --  4.2    < > = values in this interval not displayed.       CBC -   Recent Labs   Lab Test 06/03/25  0505 06/02/25  1818 06/02/25  1334   WBC 7.3 9.1 7.8   HGB 10.9* 10.9* 9.4*    224 158       LFTs -   Recent Labs   Lab Test 06/03/25  0505 06/02/25  1334 05/28/25  1050   ALKPHOS 69 59 96   BILITOTAL 0.3 0.5 0.3   ALT 6 13 16   AST 28 29 24   PROTTOTAL 5.2* 4.6* 6.3*   ALBUMIN 3.0* 2.8* 3.1*       Iron Panel -   Recent Labs   Lab Test 05/31/25  1159 05/28/25  1050 05/25/25  1212 05/04/25  1055 04/29/25  1530   IRON 38* 47* 46*   < > 112   IRONSAT 28 28 30   < > 52*   MAR  --   --   --   --  1,174*    < > = values in this interval not displayed.     Current Medications:  Current Facility-Administered Medications   Medication Dose Route Frequency Provider Last Rate Last Admin    acetaminophen (TYLENOL) tablet 975 mg  975 mg Oral or Feeding Tube Q8H Zafar Allan MD   975 mg at 06/03/25 0756    aspirin (ASA) chewable tablet 162 mg  162 mg Oral or NG Tube Daily Zafar Allan MD   162 mg at 06/03/25 0756    carvedilol (COREG) tablet 3.125 mg  3.125 mg Oral BID w/meals Paul Masterson, YOLANDA        ceFAZolin (ANCEF) 1 g vial to attach to  ml bag for ADULT or 50 ml bag for PEDS  1 g Intravenous Q24H Zafar Allan MD        heparin ANTICOAGULANT injection 5,000 Units  5,000 Units Subcutaneous Q8H Zafar Allan MD        insulin aspart (NovoLOG) injection  (RAPID ACTING)  1-7 Units Subcutaneous TID AC Paul Masterson PA-C        insulin aspart (NovoLOG) injection (RAPID ACTING)  1-5 Units Subcutaneous At Bedtime Paul Masterson PA-C        levothyroxine (SYNTHROID/LEVOTHROID) tablet 200 mcg  200 mcg Oral or Feeding Tube QAM AC Zafar Allan MD   200 mcg at 06/03/25 0756    pantoprazole (PROTONIX) 2 mg/mL suspension 40 mg  40 mg Oral or NG Tube Daily Zafar Allan MD        Or    pantoprazole (PROTONIX) EC tablet 40 mg  40 mg Oral Daily Zafar Allan MD   40 mg at 06/03/25 0756    polyethylene glycol (MIRALAX) Packet 17 g  17 g Oral or Feeding Tube Daily Zafar Allan MD   17 g at 06/03/25 0756    rosuvastatin (CRESTOR) tablet 10 mg  10 mg Oral Daily Paul Masterson PA-C        senna-docusate (SENOKOT-S/PERICOLACE) 8.6-50 MG per tablet 1 tablet  1 tablet Oral or Feeding Tube BID Zafar Allan MD   1 tablet at 06/03/25 0756    sodium chloride (PF) 0.9% PF flush 3 mL  3 mL Intracatheter Q8H LEEANNA Zafar Allan MD   3 mL at 06/03/25 0559     Current Facility-Administered Medications   Medication Dose Route Frequency Provider Last Rate Last Admin    dianeal PD LOW calcium-1.5% dex (calcium 2.5 mEq/L) PERITONEAL DIALYSATE   Dialysis PERITONEAL DIALYSIS Nasir Alcantar MD        Reason beta blocker order not selected   Does not apply DOES NOT GO TO Zafar Patel MD Samuel Weinberg, MD

## 2025-06-03 NOTE — OP NOTE
DATE OF PROCEDURE:6/2/2025    PREOPERATIVE DIAGNOSIS:  1. Coronary artery disease 2.End-stage renal failure on dialysis 3.Hyperlipidemia 5. Hypertension      POSTOPERATIVE DIAGNOSIS: Same     PROCEDURES:   1. Coronary bypass grafting x 3 (left internal mammary artery to left anterior descending artery, saphenous vein graft to PDA, saphenous vein graft to 1st OM artery).  2. Endoscopic vein harvest.  3. Lower extremity ultrasound evaluation       SURGEON:  Ridge Huerta MD     ASSISTANTS:Zafar Allan MD, Rosio PADRON     NOTE: A JIAN was required for this operation to perform vein  harvesting, assisting in performance of the anastomosis as well as the rest of the operation due to their specialized skill set and standard of care.     OPERATIVE INDICATIONS: Cesar Rashid is a 74 year old male with PMH of ESRD (on PD) and getting evaluated for kidney txp, HTN, inferior STEMI 2023, VF cardiac arrest 2023 s/p ICD placement, ANCA vasculitis . Decision was made to proceed with coronary artery bypass grafting.  I discussed risks, benefits of surgery with the patient including risk of death, stroke, bleeding, wound failure, arrhythmias.  He understood and wanted to proceed with surgery.     OPERATIVE FINDINGS:  The patient's sternum had good quality  Veins obtained were adequate for bypass grafting.  LIMA was adequate quality with good flow.  Vessels bypassed included the  left anterior descending artery 2 mm vessel with proximal stenosis, and the PDA and OM which was 2 mm diameter. A lower extremity ultrasound was done was to confirm adequate quality and location of vein conduit.      DESCRIPTION OF PROCEDURE:  The patient was brought to the operating room in stable condition, put under general anesthesia. The patient's chest, abdomen, lower extremities were prepped and draped in usual manner.  A long segment of saphenous vein was harvested from the left lower extremity via endoscopic vein harvest techniques.    Simultaneously, a median sternotomy was performed.   The left internal mammary artery was harvested from bed and dilated with topical papaverine.  Preparation for cardiopulmonary bypass included ACT-guided heparinization and the administration of Amicar.   Aorta was cannulated with a 18-Irish arterial cannula via 3-0 Tevdek pursestring pledgeted sutures x 2.  Dual stage venous cannula was inserted in right atrium.  Antegrade and retrograde cardioplegic cannulae were placed.  Full cardiopulmonary bypass was initiated.  Aortic pressure was temporarily reduced and the aorta was crossclamped.  One liter of cold blood cardioplegia administered with antegrade and retrograde routes.  Subsequent dose of cardioplegia given at no greater than 20-minute intervals, via the retrograde route as well as via the completed grafts. Reverse saphenous vein was anastomosed end-to-side to an arteriotomy in the mid portion of the PDA using 7-0 Prolene.    Reverse saphenous vein was anastomosed end-to-side to an arteriotomy in the mid portion of the OM artery using 7-0 Prolene.  Distal end of the left internal mammary artery was anastomosed end-to-side to an arteriotomy mid portion of the left anterior descending artery using 7-0 Prolene.  Proximal end of saphenous vein grafts  were anastomosed to 2 4 mm aortotomy using 6-0 Prolene.  Warm dose of  hotshot cardioplegia was given and with high suction on the aortic root vent, the cross-clamp was released.  Organized rhythm resumed without the need for defibrillations.  Rewarming and reperfusion allowed.  De-airing was confirmed by echocardiography.  Echo showed normal EF. The patient gradually weaned off cardiopulmonary bypass with low dose epinephrine.    Protamine was given.  All cannulas removed.  Careful hemostasis was obtained.  Two anterior mediastinal and left pleural chest tube was placed.  Sternum was closed with surgical steel wires.  Fascia, subcutaneous tissue, skin of chest  were closed in layers.     Note, I  was physically present for the entire operation and performed key portions of the operation.

## 2025-06-03 NOTE — PROGRESS NOTES
Critical Care Services Progress Note:    I personally examined and evaluated the patient on 06/03/25    Key decisions include:  CAD s/p CAB x3 POD 1 - doing extremely well, no inotropes, pressors, hemodynamically stable.  ASA, statin, BB  Acute post operative pain - multimodal analgesia  Anemia - acute blood loss, low concern for ongoing bleeding  Cheondoism - minimize lab draws, pedi tubes only  Rreg diet  PT/OT    I have evaluated all laboratory values and imaging studies from the past 24 hours.  I personally managed the respiratory and hemodynamic support, metabolic abnormalities, nutritional status, antimicrobial therapy, and pain/sedation management.    I agree with the assessment and plan. I spent 25 minutes of NON critical care time exclusive of procedures evaluating and managing this patient, discussing with the consultants, and updating the patient and family.    Dispo: OK for floor    Je Medina MD    Department of Anesthesiology

## 2025-06-03 NOTE — PROGRESS NOTES
CV ICU PROGRESS NOTE        ASSESSMENT: Cesar Rashid is a 74 year old male with PMH of ESRD (on PD) and getting evaluated for kidney txp, HTN, inferior STEMI 2023, VF cardiac arrest 2023 s/p ICD placement, ANCA vasculitis. S/p CABG x 3 by Dr. Huerta on 6/2/2025.     CO-MORBIDITIES:   Coronary artery disease involving native coronary artery of native heart without angina pectoris  (primary encounter diagnosis)    Updates  - Restart PD dialysis   - Remove art line and RIJ  - Restart Coreg and statin   - Transfer to floor     PLAN:  Neuro/ pain/ sedation:  - Monitor neurological status. Notify the MD for any acute changes in exam.     # Acute postoperative pain  - Scheduled: Tylenol  - PRN: Tylenol, oxycodone, Dilaudid     # Sedation while on mechanical ventilation, off sedation and vent  - Propofol gtt, weaned off 6/2    Plan: No changes     Pulmonary care:   # Post-operative mechanical ventilation   - Goal SpO2 > 92%  - Incentive spirometer Q15-30 minutes when awake.  - CXR daily   - Monitor CT output   - Extubated 6/2, Sats 99% on RA    Plan: No changes     Cardiovascular:   # Cardiogenic shock  # Hx of V fib arrest s/p ICD 2023  # HTN, PTA   # Severe multi-vessel CAD s/p CABG x 3   # Hx of STEMI s/p SURESH to RCA and RPLA   AICD is set at 40, V is set at 30. Underlying rhythm.   Pre CPBP: Good biventricular function  Post CPBP: same  - Monitor hemodynamics closely  - Goal MAP >65, SBP <130   - Hydralazine 10mg prn  - Restart Statin 10mg  - Restart Carvedilol 3.125mg  - ASA: daily   - Cap TPW when able post-op     Plan:   - Restart Statin 10mg  - Restart Carvedilol 3.125mg  - Remove RIJ and art lines     GI care / Nutrition:   # At risk for protein calorie malnutrition   - Regular diet  - PPI  - Last BM: PTA. Bowel regimen: MiraLAX, senna    Plan: No changes      Renal / Fluids / Electrolytes:   # Hypovolemia  # ESRD on PD   BL creat appears to be ~ 11-14   - Strict I/O, daily weights, avoid/limit  nephrotoxins  - Replete lytes PRN per protocol  - Wyatt removed postop d/t PD and oliguria   - Renal medicine consult for patient hx of peritoneal dialysis   - Restart PD dialysis   - Recheck BMP as indicated     Plan: Restart PD dialysis      Endocrine:    # Stress hyperglycemia  # Hypothyroidism   Preop A1c 4.9  - Discontinue insulin gtt, start medium sliding scale   - Goal BG <180 for optimal healing  - Levothyroxine 200mg daily     Plan:  - Discontinue insulin drip  - Start sliding scale      ID / Antibiotics:  # Stress induced leukocytosis  - To complete perioperative regimen  - Monitor fever curve, WBC, and inflammatory markers as appropriate     Heme:     # Acute blood loss anemia  # Acute thrombocytopenia  #Orthodoxy  Does not accept blood products. Okay for synthetics including fibryga and kcentra.   No s/sx active bleeding  - Trending CBC as indicated   - Hgb goal > 7.0  - Recheck Hgb daily  (10.9)    Plan: Stop lab draws unless indicated      MSK / Skin:  # Sternotomy    # Surgical Incision  - Sternal precautions, postop incision management protocol  - PT/OT/CR     Prophylaxis:     - Mechanical DVT ppx  - Chemical DVT ppx: SQH   - PPI    Lines/ tubes/ drains:  - RIJ CVC, remove today  - L radial Arterial Line, remove today  - CTs x 3  - PD catheter     Disposition:  - Transfer to floor     Patient seen, findings and plan discussed with CVICU staff and cardiothoracic surgeons.    Imelda Storey, MS4  [employer]     Imelda Storey    Resident/Fellow Attestation   I, Johan Roberts MD, was present with the medical student who participated in the service and in the documentation of the note.  I have verified the history and personally performed the physical exam and medical decision making.  I agree with the assessment and plan of care as documented in the note.      Johan Roberts MD  PGY2  Date of Service (when I saw the patient): 06/03/25        Clinically Significant Risk Factors          # Hyponatremia: Lowest Na = 133 mmol/L in last 2 days, will monitor as appropriate  # Hypochloremia: Lowest Cl = 96 mmol/L in last 2 days, will monitor as appropriate  # Hypocalcemia: Lowest iCa = 3.8 mg/dL in last 2 days, will monitor and replace as appropriate    # Anion Gap Metabolic Acidosis: Highest Anion Gap = 20 mmol/L in last 2 days, will monitor and treat as appropriate  # Hypoalbuminemia: Lowest albumin = 2.8 g/dL at 6/2/2025  1:34 PM, will monitor as appropriate  # Coagulation Defect: INR = 1.56 (Ref range: 0.85 - 1.15) and/or PTT = 34 Seconds (Ref range: 22 - 38 Seconds), will monitor for bleeding  # Thrombocytopenia: Lowest platelets = 65 in last 2 days, will monitor for bleeding   # Hypertension: Noted on problem list     # Acute Hypoxic Respiratory Failure: Based on blood gas results. Continue supplemental oxygen as needed              # ICD device present            ====================================    TODAY'S PROGRESS  SUBJECTIVE  No acute overnight events. Patient reports some increased incisional pain.     OBJECTIVE  1. VITAL SIGNS  Temp:  [97.2  F (36.2  C)-97.6  F (36.4  C)] 97.2  F (36.2  C)  Pulse:  [60-78] 69  BP: (127-137)/(61-81) 137/61  MAP:  [59 mmHg-101 mmHg] 83 mmHg  Arterial Line BP: ()/(41-70) 135/59  FiO2 (%):  [30 %-50 %] 30 %  SpO2:  [97 %-100 %] 99 %  FiO2 (%): 30 %, Resp: 16, Vent Mode: (S) PS, Resp Rate (Set): 14 breaths/min, Tidal Volume (Set, mL): 460 mL, PEEP (cm H2O): 5 cmH2O, Pressure Support (cm H2O): 5 cmH2O, Resp Rate (Set): 14 breaths/min, Tidal Volume (Set, mL): 460 mL, PEEP (cm H2O): 5 cmH2O    2. INTAKE/ OUTPUT  I/O last 3 completed shifts:  In: 2942.65 [P.O.:50; I.V.:1622.65; Other:170; IV Piggyback:1100]  Out: 261 [Urine:1; Emesis/NG output:50; Chest Tube:210]    3. PHYSICAL EXAMINATION    Neuro: Awake. Alert. Follows commands. NAD.   HEENT: Normocephalic, atraumatic. Pupils nonicteric.   CV: RRR on monitor, all extremities well perfused   Respiratory:  CTAB, Normal respiratory effort on RA, equal chest rise b/l   GI: Abdomen soft and non-tender to light palpation. Non-distended   : Not voiding 2/2 hx of oliguria   MSK: Moves all extremities well with normal appearing strength.   Skin: Sternal incision Normal color. No rashes or skin lesions.   Psych: Cooperative, congruent mood and affect.       4. INVESTIGATIONS  Arterial Blood Gases   Recent Labs   Lab 06/02/25  1818 06/02/25  1619 06/02/25  1334 06/02/25  1243   PH 7.37 7.39 7.40 7.41   PCO2 39 37 41 41   PO2 140* 141* 220* 198*   HCO3 23 23 26 26     Complete Blood Count   Recent Labs   Lab 06/03/25  0505 06/02/25  1818 06/02/25  1334 06/02/25  1243 06/02/25  1213   WBC 7.3 9.1 7.8  --  3.1*   HGB 10.9* 10.9* 9.4* 9.7* 8.7*    224 158  --  65*     Basic Metabolic Panel  Recent Labs   Lab 06/03/25  0505 06/03/25  0503 06/03/25  0331 06/03/25  0153 06/02/25  1820 06/02/25  1818 06/02/25  1619 06/02/25  1334 06/02/25  1243 06/02/25  0629 05/28/25  1050     --   --   --   --  135  --  135 134*   < > 134*   POTASSIUM 4.9  --   --   --   --  4.3  --  4.3 4.3   < > 3.3*   CHLORIDE 97*  --   --   --   --  96*  --  97*  --   --  94*   CO2 19*  --   --   --   --  19*  --  23  --   --  24   BUN 42.8*  --   --   --   --  36.5*  --  34.6*  --   --  39.4*   CR 10.40*  --   --   --   --  9.38*  --  9.24*  --   --  10.80*   * 113* 116* 129*   < > 159*   < > 117*  114* 117*   < > 97    < > = values in this interval not displayed.     Liver Function Tests  Recent Labs   Lab 06/03/25  0505 06/02/25  1334 06/02/25  1213 05/28/25  1050   AST 28 29  --  24   ALT 6 13  --  16   ALKPHOS 69 59  --  96   BILITOTAL 0.3 0.5  --  0.3   ALBUMIN 3.0* 2.8*  --  3.1*   INR  --  1.56* 1.76* 1.03     Pancreatic Enzymes  No lab results found in last 7 days.  Coagulation Profile  Recent Labs   Lab 06/02/25  1334 06/02/25  1213 05/28/25  1050   INR 1.56* 1.76* 1.03   PTT 34 32 31     Lactate  Invalid input(s):  "\"LACTATE\"    5. RADIOLOGY  Recent Results (from the past 24 hours)   YULIA with Report    Narrative    Ritu Xiao MD     6/2/2025 12:44 PM  Perioperative YULIA Procedure Note    Staff -        Anesthesiologist:  Ubaldo Tamayo MD       Resident/Fellow: Ritu Xiao MD       Performed By: fellow  Preanesthesia Checklist:  Patient identified, IV assessed, risks and   benefits discussed, monitors and equipment assessed, procedure being   performed at surgeon's request and anesthesia consent obtained.    YULIA Probe Insertion    Probe Status PRE Insertion: NO obvious damage  Probe type:  Adult 3D  Bite block used:   Oral Airway  Insertion Technique: Jaw Lift  Insertion complications: None obvious  Billing Report:YULIA report by Anesthesiologist (See Separate Report note)  Probe Status POST Removal: NO obvious damage    YULIA Report  General Procedure Information  Images for this study have been archived.  Modalities: 2D, CW Doppler, Color flow mapping and PW Doppler  Diagnostic Indications comments: Assessment of systolic function;   assessment of valvular function..  Echocardiographic and Doppler Measurements  Right Ventricle:  Cavity size normal.    Hypertrophy not present.     Thrombus not present.    Global function normal.     Left Ventricle:  Cavity size normal.    Hypertrophy present.   Thrombus   not present.   Global Function mildly impaired.   Ejection Fraction 50%.      Ventricular Regional Function:  1- Basal Anteroseptal:  hypokinetic  2- Basal Anterior:  normal  3- Basal Anterolateral:  normal  4- Basal Inferolateral:  normal  5- Basal Inferior:  normal  6- Basal Inferoseptal:  normal  7- Mid Anteroseptal:  hypokinetic  8- Mid Anterior:  normal  9- Mid Anterolateral:  normal  10- Mid Inferolateral:  normal  11- Mid Inferior:  normal  12- Mid Inferoseptal:  hypokinetic  13- Apical Anterior:  normal  14- Apical Lateral:  normal  15- Apical Inferior:  normal  16- Apical Septal:  normal  17- Kerens:  " normal    Valves  Aortic Valve: Annulus normal.  Stenosis not present.  Regurgitation   absent.  Leaflets normal.  Leaflet motions normal.    Mitral Valve: Annulus normal.  Stenosis not present.  Regurgitation +1.    Leaflets normal.  Leaflet motions normal.    Tricuspid Valve: Annulus normal.  Stenosis not present.  Regurgitation +1.    Leaflets normal.  Leaflet motions normal.    Pulmonic Valve: Annulus normal.  Stenosis not present.  Regurgitation +1.        Aorta: Ascending Aorta: Size normal.  Dissection not present.  Plaque   thickness less than 3 mm.  Mobile plaque not present.    Aortic Arch: Size normal.    Dissection not present.   Plaque thickness   less than 3 mm.   Mobile plaque not present.    Descending Aorta: Size normal.    Dissection not present.   Plaque   thickness less than 3 mm.   Mobile plaque not present.      Right Atrium:  Size normal.   Spontaneous echo contrast not present.     Thrombus not present.   Tumor not present.   Device not present.     Left Atrium: Size normal.  Spontaneous echo contrast not present.    Thrombus not present.  Tumor not present.  Device not present.    Left atrial appendage normal.     Atrial Septum: Intra-atrial septal morphology normal.       Ventricular Septum: Intra-ventricular septum morphology normal.        Other Findings:   Pericardium:  normal. Pleural Effusion:  none. Pulmonary Arteries:    normal. Pulmonary Venous Flow:  normal. Cornoary sinus catheter present.   Coronary sinus size (mm):  8.   Post Intervention Findings  Procedure(s) performed:  CABG. Global function:  Improved. Regional wall   motion: Improved. Surgeon(s) notified of all postintervention findings:   Yes (In real time).                 Echocardiogram Comments  Echocardiogram comments:   PRE-PROCEDURE:  Intraoperative YULIA -- 74 year old male undergoing multivessel CABG:  At time of assessment, patient was under general anesthesia.   Rhythm: NSR / SB  Aorta: No aneurysm, no dissection,  no mobile plaque.  LV: Normal chamber size. +LVH. Mildly reduced systolic function, LVEF   45-50% by qualitative assessment with hypokinesis of the basal and mid-pap   anteroseptal walls.  RV: Normal chamber size. Preserved systolic function.  Trace MR. Trace TR. Trace PI.  Pleura: No pleural effusion.  No evidence of PFO by color flow doppler. HARPAL without thrombus by color   flow doppler and exit velocity.  All findings communicated to surgical team.       POST-PROCEDURE:  S/p CABG x3      Rhythm: NSR  LV: Improved systolic function (LVEF 55-60%), improvement of previous   RWMA.  RV: Unchanged, preserved function.  Valves: No interval valvulopathies. Trace MR. Trace TR. Trace PI.  Aorta: No interval changes. No evidence of aortic dissection s/p   decannulation.  Pleura: No interval pleural effusion.  The remainder of the post-intervention echocardiographic exam was   unchanged from baseline. All findings discussed with surgical team.   Cardiac Device Check - Inpatient   Result Value    Date Time Interrogation Session [id number]    Implantable Pulse Generator  Medtronic    Implantable Pulse Generator Model [id number] Cobalt XT VR MRI    Implantable Pulse Generator Serial Number [id number]    Type Interrogation Session In Clinic    Clinic Name [facility]    Implantable Pulse Generator Type Defibrillator    Implantable Pulse Generator Implant Date 20230911    Implantable Lead  Medtronic    Implantable Lead Model 6935M Sprint Quattro Secure S    Implantable Lead Serial Number [id number]    Implantable Lead Implant Date 20230911    Implantable Lead Polarity Type Tripolar Lead    Implantable Lead Location Detail 1 UNKNOWN    Implantable Lead Special Function 62 CM    Implantable Lead Location Right Ventricle    Implantable Lead Connection Status Connected    Flo Setting Mode (NBG Code) VVI    Flo Setting Lower Rate Limit 40    Flo Setting Hysterisis Rate Off    Lead  Channel Setting Sensing Sensitivity Off    Lead Channel Setting Sensing Polarity Bipolar    Lead Channel Setting Sensing Anode Location Right Ventricle    Lead Channel Setting Sensing Anode Terminal Ring    Lead Channel Setting Sensing Cathode Location Right Ventricle    Lead Channel Setting Sensing Cathode Terminal Tip    Lead Channel Setting Sensing Sensitivity 0.45    Lead Channel Setting Pacing Polarity Bipolar    Lead Channel Setting Pacing Anode Location Right Ventricle    Lead Channel Setting Pacing Anode Terminal Ring    Lead Channel Setting Sensing Cathode Location Right Ventricle    Lead Channel Setting Sensing Cathode Terminal Tip    Lead Channel Setting Pacing Pulse Width 0.4    Lead Channel Setting Pacing Amplitude 2.0    Lead Channel Setting Pacing Capture Mode Adaptive    Zone Setting Type Category VF    Zone Setting Vendor Type Category VF    Zone Setting Status Inactive    Zone Setting Detection Interval 320    Zone Setting Detection Beats Numerator 30    Zone Setting Detection Beats Denominator 40    Zone Setting Type Category VT    Zone Setting Vendor Type Category FastVT    Zone Setting Status Inactive    Zone Setting Detection Interval 240    Zone Setting Type Category VT    Zone Setting Vendor Type Category VT    Zone Setting Status Inactive    Zone Setting Detection Interval 370    Zone Setting Detection Beats Numerator 20    Zone Setting Detection Beats Denominator 20    Zone Setting Type Category VT    Zone Setting Vendor Type Category MonVT    Zone Setting Status Inactive    Zone Setting Detection Interval 400    Zone Setting Detection Beats Numerator 32    Zone Setting Detection Beats Denominator 32    Lead Channel Impedance Value 304    Lead Channel Impedance Value 361    Lead Channel Sensing Intrinsic Amplitude 7.4    Lead Channel Pacing Threshold Amplitude 1.0    Lead Channel Pacing Threshold Pulse Width 0.4    Lead Channel Pacing Threshold Amplitude 1.0    Lead Channel Pacing Threshold  Pulse Width 0.4    Battery Date Time of Measurements 14981889567171    Battery RRT Trigger 2.8 V    Battery Remaining Longevity 146    Battery Voltage 3.02    Capacitor Charge Type Shock    Capacitor Last Charge Date Time 90691008001065    Capacitor Charge Time 3.9    Capacitor Charge Energy 18.0    Flo Statistic Date Time Start 20231010112825    Flo Statistic Date Time End 74585127084008    Flo Statistic RV Percent Paced 0.01    Atrial Tachy Statistic Date Time Start 20231010112825    Atrial Tachy Statistic Date Time End 20250602070955    Atrial Tachy Statistic AT/AF [place] Percent 0    Therapy Statistic Recent Shocks Delivered 0    Therapy Statistic Recent Shocks Aborted 0    Therapy Statistic Recent ATP Delivered 0    Therapy Statistic Recent Date Time Start 20231010112825    Therapy Statistic Recent Date Time End 92070539504877    Therapy Statistic Total Shocks Delivered 0    Therapy Statistic Total Shocks Aborted 0    Therapy Statistic Total ATP Delivered 0    Therapy Statistic Total  Date Time Start 52284729696872    Therapy Statistic Total  Date Time End 89207408816620    Episode Statistic Recent Count 0    Episode Statistic Type Category AT/AF    Episode Statistic Recent Count 0    Episode Statistic Type Category Patient Activated    Episode Statistic Recent Count 0    Episode Statistic Type Category SVT    Episode Statistic Recent Count 0    Episode Statistic Type Category VT    Episode Statistic Recent Count 0    Episode Statistic Type Category VF    Episode Statistic Recent Count 0    Episode Statistic Type Category VT    Episode Statistic Recent Count 0    Episode Statistic Type Category VT    Episode Statistic Recent Count 0    Episode Statistic Type Category VT    Episode Statistic Recent Date Time Start 63036867030949    Episode Statistic Recent Date Time End 02857831998128    Episode Statistic Recent Date Time Start 95743144485159    Episode Statistic Recent Date Time End 24712184837408     Episode Statistic Recent Date Time Start 50494005548254    Episode Statistic Recent Date Time End 42962881706051    Episode Statistic Recent Date Time Start 17832593215710    Episode Statistic Recent Date Time End 24477918914005    Episode Statistic Recent Date Time Start 10886658554263    Episode Statistic Recent Date Time End 66233672414504    Episode Statistic Recent Date Time Start 54420049767088    Episode Statistic Recent Date Time End 21791533398551    Episode Statistic Recent Date Time Start 04423325416419    Episode Statistic Recent Date Time End 23028514646353    Episode Statistic Recent Date Time Start 36060131030678    Episode Statistic Recent Date Time End 02206098416348    Episode Statistic Total Count 0    Episode Statistic Type Category Patient Activated    Episode Statistic Total Count 0    Episode Statistic Type Category SVT    Episode Statistic Total Count 0    Episode Statistic Type Category VT    Episode Statistic Total Count 0    Episode Statistic Type Category VF    Episode Statistic Total Count 0    Episode Statistic Type Category VT    Episode Statistic Total Count 0    Episode Statistic Type Category VT    Episode Statistic Total Count 0    Episode Statistic Type Category VT    Episode Statistic Total Date Time Start 72437807031737    Episode Statistic Total Date Time End 25292228076183    Episode Statistic Total Date Time Start 19939818815473    Episode Statistic Total Date Time End 65090417932956    Episode Statistic Total Date Time Start 43980980891729    Episode Statistic Total Date Time End 58954722874126    Episode Statistic Total Date Time Start 93424174254929    Episode Statistic Total Date Time End 63750103528220    Episode Statistic Total Date Time Start 19259224004439    Episode Statistic Total Date Time End 10688341874238    Episode Statistic Total Date Time Start 55145444727018    Episode Statistic Total Date Time End 62125008830292    Episode Statistic Total Date Time Start  73075755979741    Episode Statistic Total Date Time End 14143139671929    Narrative    Patient seen on 3C pre-op for evaluation and iterative programming of their ICD per MD orders.     Device: Medtronic [id number] Maud XT VR MRI  Normal device function  Mode: VVI 40 bpm  : <0.1%  Intrinsic rhythm: VS @ 61 bpm  Thoracic Impedance: Near reference line  Short V-V intervals: 0  Lead Trends Appear Stable: Yes  Estimated battery longevity to RRT = 12.1 years. Battery voltage = 3.03 V.   Atrial Arrhythmia: 0  Ventricular Arrhythmia: 0  Setting Changes: ICD tachy therapies programmed off per Anesthesia MD orders.  CASSI Blackwell RN    Single lead ICD    I have reviewed and interpreted the device interrogation, settings, programming and nurse's summary. The device is functioning within normal device parameters. I agree with the current findings, assessment and plan.   XR Chest Port 1 View    Narrative    EXAM:  XR CHEST PORT 1 VIEW    INDICATION: Post Op CVTS Surgery    COMPARISON:  Chest x-ray 5/28/2025    FINDINGS:  Single AP view of the chest. Sternotomy wires. Left chest cardiac  device with stable lead placement. Endotracheal tube terminates over  the low thoracic trachea. Multiple subxiphoid approach chest tubes.  Left basilar chest tube. Enteric tube. Left IJ central venous catheter  terminates over the brachiocephalic confluence.    Cardiomediastinal silhouette within normal limits. Low lung volumes.  Bilateral perihilar opacities. Left mid lung field linear opacity. No  pneumothorax.  No pleural effusion.       Impression    IMPRESSION:  1. Postoperative chest with mild pulmonary edema and subsegmental  atelectasis.  2. Endotracheal tube terminates at the low thoracic trachea.    I have personally reviewed the examination and initial interpretation  and I agree with the findings.    TOBI RODARTE MD         SYSTEM ID:  [id number]   XR Abdomen Port 1 View    Narrative    EXAMINATION:  XR ABDOMEN PORT 1 VIEW  6/2/2025     COMPARISON: None.    HISTORY: OG tube.    TECHNIQUE: One frontal supine view of the abdomen.    FINDINGS: Interval placement of enteric tube with side hole in the  stomach. No abnormally dilated air-filled loops of bowel. The lung  bases are unremarkable.   Cardiac pacemaker with its leads in the stable position. Chest tube in  place. Peritoneal dialysis catheter in place.      Impression    IMPRESSION:   1. Interval placement of enteric tube in the stomach.  2. Non-obstructive bowel gas pattern.    I have personally reviewed the examination and initial interpretation  and I agree with the findings.    FRANCESCA UMANZOR MD         SYSTEM ID:  [id number]   Cardiac Device Check - Inpatient   Result Value    Date Time Interrogation Session [id number]    Implantable Pulse Generator  Medtronic    Implantable Pulse Generator Model [id number] Cobalt XT VR MRI    Implantable Pulse Generator Serial Number [id number]    Type Interrogation Session In Clinic    Clinic Name [facility]    Implantable Pulse Generator Type Defibrillator    Implantable Pulse Generator Implant Date 20230911    Implantable Lead  Medtronic    Implantable Lead Model 6935M Sprint Quattro Secure S    Implantable Lead Serial Number [id number]    Implantable Lead Implant Date 20230911    Implantable Lead Polarity Type Tripolar Lead    Implantable Lead Location Detail 1 UNKNOWN    Implantable Lead Special Function 62 CM    Implantable Lead Location Right Ventricle    Implantable Lead Connection Status Connected    Flo Setting Mode (NBG Code) VVI    Flo Setting Lower Rate Limit 40    Flo Setting Hysterisis Rate Off    Lead Channel Setting Sensing Sensitivity Off    Lead Channel Setting Sensing Polarity Bipolar    Lead Channel Setting Sensing Anode Location Right Ventricle    Lead Channel Setting Sensing Anode Terminal Ring    Lead Channel Setting Sensing Cathode Location Right Ventricle    Lead  Channel Setting Sensing Cathode Terminal Tip    Lead Channel Setting Sensing Sensitivity 0.45    Lead Channel Setting Pacing Polarity Bipolar    Lead Channel Setting Pacing Anode Location Right Ventricle    Lead Channel Setting Pacing Anode Terminal Ring    Lead Channel Setting Sensing Cathode Location Right Ventricle    Lead Channel Setting Sensing Cathode Terminal Tip    Lead Channel Setting Pacing Pulse Width 0.4    Lead Channel Setting Pacing Amplitude 2.0    Lead Channel Setting Pacing Capture Mode Adaptive    Zone Setting Type Category VF    Zone Setting Vendor Type Category VF    Zone Setting Status Active    Zone Setting Detection Interval 320    Zone Setting Detection Beats Numerator 30    Zone Setting Detection Beats Denominator 40    Zone Setting Type Category VT    Zone Setting Vendor Type Category FastVT    Zone Setting Status Active    Zone Setting Detection Interval 240    Zone Setting Type Category VT    Zone Setting Vendor Type Category VT    Zone Setting Status Inactive    Zone Setting Detection Interval 370    Zone Setting Detection Beats Numerator 20    Zone Setting Detection Beats Denominator 20    Zone Setting Type Category VT    Zone Setting Vendor Type Category MonVT    Zone Setting Status Monitor    Zone Setting Detection Interval 400    Zone Setting Detection Beats Numerator 32    Zone Setting Detection Beats Denominator 32    Lead Channel Impedance Value 266    Lead Channel Impedance Value 342    Lead Channel Sensing Intrinsic Amplitude 6.9    Lead Channel Pacing Threshold Amplitude 1.0    Lead Channel Pacing Threshold Pulse Width 0.4    Lead Channel Pacing Threshold Amplitude 1.0    Lead Channel Pacing Threshold Pulse Width 0.4    Battery Date Time of Measurements [id number]    Battery RRT Trigger 2.8 V    Battery Remaining Longevity 146    Battery Voltage 3.02    Capacitor Charge Type Shock    Capacitor Last Charge Date Time [id number]    Capacitor Charge Time 3.9    Capacitor  Charge Energy 18.0    Flo Statistic Date Time Start 73265758310614    Flo Statistic Date Time End 72491729217798    Flo Statistic RV Percent Paced 10.42    Atrial Tachy Statistic Date Time Start 88517650595986    Atrial Tachy Statistic Date Time End 48033100871180    Atrial Tachy Statistic AT/AF [place] Percent 0    Therapy Statistic Recent Shocks Delivered 0    Therapy Statistic Recent Shocks Aborted 0    Therapy Statistic Recent ATP Delivered 0    Therapy Statistic Recent Date Time Start 69434647576593    Therapy Statistic Recent Date Time End 18058431049723    Therapy Statistic Total Shocks Delivered 0    Therapy Statistic Total Shocks Aborted 0    Therapy Statistic Total ATP Delivered 0    Therapy Statistic Total  Date Time Start 62968545228818    Therapy Statistic Total  Date Time End 95082473944006    Episode Statistic Recent Count 0    Episode Statistic Type Category AT/AF    Episode Statistic Recent Count 0    Episode Statistic Type Category Patient Activated    Episode Statistic Recent Count 0    Episode Statistic Type Category SVT    Episode Statistic Recent Count 0    Episode Statistic Type Category VT    Episode Statistic Recent Count 0    Episode Statistic Type Category VF    Episode Statistic Recent Count 0    Episode Statistic Type Category VT    Episode Statistic Recent Count 0    Episode Statistic Type Category VT    Episode Statistic Recent Count 0    Episode Statistic Type Category VT    Episode Statistic Recent Date Time Start 39064178694058    Episode Statistic Recent Date Time End 41110303272608    Episode Statistic Recent Date Time Start 05160881673352    Episode Statistic Recent Date Time End 95718809543615    Episode Statistic Recent Date Time Start 44560600497216    Episode Statistic Recent Date Time End 02304269191255    Episode Statistic Recent Date Time Start 93004437474109    Episode Statistic Recent Date Time End 03977494945666    Episode Statistic Recent Date Time Start  69494500659651    Episode Statistic Recent Date Time End 34451454361349    Episode Statistic Recent Date Time Start 53954033753186    Episode Statistic Recent Date Time End 95995314869621    Episode Statistic Recent Date Time Start 74090046965330    Episode Statistic Recent Date Time End 43754079983376    Episode Statistic Recent Date Time Start 45846010696374    Episode Statistic Recent Date Time End 90888773434897    Episode Statistic Total Count 0    Episode Statistic Type Category Patient Activated    Episode Statistic Total Count 0    Episode Statistic Type Category SVT    Episode Statistic Total Count 0    Episode Statistic Type Category VT    Episode Statistic Total Count 0    Episode Statistic Type Category VF    Episode Statistic Total Count 0    Episode Statistic Type Category VT    Episode Statistic Total Count 0    Episode Statistic Type Category VT    Episode Statistic Total Count 0    Episode Statistic Type Category VT    Episode Statistic Total Date Time Start 00467706939347    Episode Statistic Total Date Time End 93965549868964    Episode Statistic Total Date Time Start 57360495284859    Episode Statistic Total Date Time End 42195304558060    Episode Statistic Total Date Time Start 33842945767578    Episode Statistic Total Date Time End 57367005035408    Episode Statistic Total Date Time Start 61145085459914    Episode Statistic Total Date Time End 48586962474574    Episode Statistic Total Date Time Start 31761061252432    Episode Statistic Total Date Time End 39075976143403    Episode Statistic Total Date Time Start 33772749454898    Episode Statistic Total Date Time End 20844862648906    Episode Statistic Total Date Time Start 84143830769197    Episode Statistic Total Date Time End 05383898584501    Narrative    Patient seen on 4A s/p CABG for evaluation and iterative programming of their ICD per MD orders.     Device: Medtronic [id number] [place] XT VR MRI  Normal device function  Mode: VVI 40  bpm  : 10.4%  Intrinsic rhythm: VS @ 66 bpm   Short V-V intervals: 275 since pre-op check this am.  Lead Trends Appear Stable: Yes  Estimated battery longevity to RRT = 12.1 years. Battery voltage = 3.03 V.   Setting Changes: ICD tachy therapies programmed back on.  CASSI Blackwell RN    Single lead ICD    I have reviewed and interpreted the device interrogation, settings, programming and nurse's summary. The device is functioning within normal device parameters. I agree with the current findings, assessment and plan.

## 2025-06-03 NOTE — PROGRESS NOTES
Cardiovascular Surgery Progress Note  06/03/2025         Assessment and Plan:     Cesar Rashid is a 74 year old male with PMH of ESRD (on PD) and getting evaluated for kidney txp, HTN, inferior STEMI 2023, VF cardiac arrest 2023 s/p ICD placement, ANCA vasculitis. He is being worked up for kidney transplant and found progression of his severe 3 vessel CAD. CVTS evaluation found him a suitable candidate for surgery. He does have Hx STEMI with VF arrest in Sept, 2023. He is a Mandaeism and does not want to receive blood products. He was asymptomatic concerning his CAD.   Cesar is s/p scheduled CABG x 3 by Dr. Huerta on 6/2/2025     Cardiovascular:   HTN and HLD  Hx of severe 3 vessel CAD w STEMI and VF arrest (2023)  Hx shockwave lithotripsy and SURESH to RCA/RPLA (Sept, 2023)  Hx ICD placement  S/p 3 vessel CABG  CAD: admitted to [facility] (9/2023) with inferior STEMI c/b VF arrest with SURESH to RCA and SURESH to RPLA. Had ICD placement at that time.   CABG: LIMA to LAD, vein to PDA, vein to OM1.  No arrhythmia, HD stable.  Intra-op YULIA 6/2/25; LV EF 55-60%, improved R WMA, RV function preserved, trace MR and TR.  -  mg, rosuvastatin 10 mg daily.  - Coreg 6.25 mg BID (PTA dose)    Chest tubes: 2 mediastinals and Left pleural removed 6/4  TPW: removed 6/4    Pulmonary:  Pulmonary Nodules  - Extubated POD 0 to RA. Now saturating well on RA.   - Supplemental O2 PRN to keep sats > 92%. Wean off as tolerated.  - Pulm toilet, IS, activity and deep breathing    Neurology / MSK:  Median Sternotomy  Left LE vein harvest   - Strict sternotomy precautions for 12 weeks  - Neuro intact  - Acute post-operative pain well controlled with acetaminophen, PO oxycodone PRN, IV dilaudid PRN     / Renal:  ESRD on Nocturnal Peritoneal Dialysis, aneuric   Bladder Cancer  - Hx of renal disease. Most recent creatinine 11.4, adequate UOP.   - Pre-op weight 167 lbs. Weight; + 1 lbs since admit and trending stable.   - 24 hr  Fluid status; net gain 500 mL. Aneuric  - Diuresis: not indicated    GI / FEN:   - Regular diet, continue bowel regimen until first BM  - Replace electrolytes as needed, hepatic enzymes WNL.    Endocrine / Rheumatology:  Pre-op Hgb A1C 4.9.  - Stress induced hyperglycemia initially managed on insulin drip postop, transitioned to sliding scale.  - Hx ANCA associated vasculitis with associated renal compromise    Infectious Disease:  Stress induced leukocytosis  - WBC WNL, remains afebrile, no signs or symptoms of infection  - Completed perioperative antibiotics    Hematology:   Pentecostal, avoid blood products   Chronic Iron deficiency anemia  Acute blood loss anemia and thrombocytopenia  Hgb 10.8; Plt WNL, no signs or symptoms of active bleeding  Avoid blood draws, as able. Pediatric tubes when necessary.     Antiplatelet / Anticoagulation:   -  mg    Prophylaxis:   - Stress ulcer prophylaxis: Pantoprazole 40 mg daily for 30 days  - DVT prophylaxis: Subcutaneous heparin, SCD    Disposition:   - Transferred to  on 6/3/2025  - Therapies recommending discharge to Home w Assist, TBD  - Medically Ready for Discharge: Anticipated in 2-4 Days      Clinically Significant Risk Factors         # Hyponatremia: Lowest Na = 133 mmol/L in last 2 days, will monitor as appropriate  # Hypochloremia: Lowest Cl = 96 mmol/L in last 2 days, will monitor as appropriate  # Hypocalcemia: Lowest iCa = 3.8 mg/dL in last 2 days, will monitor and replace as appropriate    # Anion Gap Metabolic Acidosis: Highest Anion Gap = 20 mmol/L in last 2 days, will monitor and treat as appropriate  # Hypoalbuminemia: Lowest albumin = 2.8 g/dL at 6/2/2025  1:34 PM, will monitor as appropriate  # Coagulation Defect: INR = 1.56 (Ref range: 0.85 - 1.15) and/or PTT = 34 Seconds (Ref range: 22 - 38 Seconds), will monitor for bleeding  # Thrombocytopenia: Lowest platelets = 65 in last 2 days, will monitor for bleeding   # Hypertension: Noted on  "problem list     # Acute Hypoxic Respiratory Failure: Based on blood gas results. Continue supplemental oxygen as needed              # ICD device present  # History of CABG: noted on surgical history       Discussed with Dr Huerta through verbal communication.      Attila Brewer PA-C  Cardiothoracic Surgery  Pager [phone]    4:24 PM   Florina 3, 2025        Interval History:     No overnight events. Up from ICU yesterday afternoon.   States pain is well managed on current regimen. Slept well overnight.  Tolerating diet, is passing flatus, no BM. No nausea or vomiting.  Breathing well without complaints.   Working with therapies and ambulating in halls without assistance.   Denies chest pain, palpitations, dizziness, syncopal symptoms, fevers, chills, myalgias, or sternal popping/clicking.         Physical Exam:   Blood pressure 118/72, pulse 69, temperature 97.9  F (36.6  C), temperature source Oral, resp. rate 18, height 1.753 m (5' 9\"), weight 75.9 kg (167 lb 5.3 oz), SpO2 98%.  Vitals:    06/02/25 0515   Weight: 75.9 kg (167 lb 5.3 oz)      MAPs: 86 - 108   Gen: A&Ox4, NAD  Neuro: no focal deficits   CV: RRR, normal S1 S2, no murmurs, rubs or gallops.      * removed Temp pacing wires without immediate complication  Pulm: CTA, no wheezing or rhonchi, normal breathing on RA  Abd: nondistended, normal BS, soft, nontender  Ext: no peripheral edema  Incision: clean, dry, intact, no erythema, sternum stable  Tubes/drain sites: dressing clean and dry, serosanguinous output, no air leak. 24 hr output 240 mL.     * removed mediastinal and L pleural tubes without immediate complication    Imaging:  reviewed recent imaging, no acute concerns after chest tube removal.     Labs:  BMP  Recent Labs   Lab 06/03/25  1256 06/03/25  0756 06/03/25  0617 06/03/25  0505 06/02/25  1820 06/02/25  1818 06/02/25  1619 06/02/25  1334 06/02/25  1243 06/02/25  0629 05/28/25  1050   NA  --   --   --  135  --  135  --  135 134*   < > 134* "   POTASSIUM  --   --   --  4.9  --  4.3  --  4.3 4.3   < > 3.3*   CHLORIDE  --   --   --  97*  --  96*  --  97*  --   --  94*   ISIDRO  --   --   --  8.9  --  9.1  --  9.1  --   --  9.0   CO2  --   --   --  19*  --  19*  --  23  --   --  24   BUN  --   --   --  42.8*  --  36.5*  --  34.6*  --   --  39.4*   CR  --   --   --  10.40*  --  9.38*  --  9.24*  --   --  10.80*   * 100* 118* 112*   < > 159*   < > 117*  114* 117*   < > 97    < > = values in this interval not displayed.     CBC  Recent Labs   Lab 06/03/25  0505 06/02/25  1818 06/02/25  1334 06/02/25  1243 06/02/25  1213   WBC 7.3 9.1 7.8  --  3.1*   RBC 3.54* 3.50* 3.05*  --  2.76*   HGB 10.9* 10.9* 9.4* 9.7* 8.7*   HCT 33.8* 32.7* 28.9*  --  26.8*   MCV 96 93 95  --  97   MCH 30.8 31.1 30.8  --  31.5   MCHC 32.2 33.3 32.5  --  32.5   RDW 16.9* 16.9* 16.5*  --  16.5*    224 158  --  65*     INR  Recent Labs   Lab 06/02/25  1334 06/02/25  1213 05/28/25  1050   INR 1.56* 1.76* 1.03      Hepatic Panel  Recent Labs   Lab 06/03/25  0505 06/02/25  1334 05/28/25  1050   AST 28 29 24   ALT 6 13 16   ALKPHOS 69 59 96   BILITOTAL 0.3 0.5 0.3   ALBUMIN 3.0* 2.8* 3.1*     GLUCOSE:   Recent Labs   Lab 06/03/25  1256 06/03/25  0756 06/03/25  0617 06/03/25  0505 06/03/25  0503 06/03/25  0331   * 100* 118* 112* 113* 116*

## 2025-06-03 NOTE — PHARMACY-ADMISSION MEDICATION HISTORY
Pharmacy Intern Admission Medication History    Admission medication history is complete. The information provided in this note is only as accurate as the sources available at the time of the update.    Information Source(s): Patient and Family member via in-person    Pertinent Information:     Patient's wife was a moderate historian  Patient stopped all supplements before procedure (last date 5/26)  Patient experienced stomach pain from sevelamer during dialysis (phosphorus binder) and switched to calcium aminoate for the same purpose (5/30)  Mircera injections provided by Renetta  Pt hasn't taken nitroglycerin sublingual tablet in ~1.5 yrs      Changes made to PTA medication list:  Added:   Added recent 200 mcg subcutaneous injections of Mircera for CKD-induced anemia per spouse (one dose 1.5 weeks ago, another 2 weeks before that)  Added calcium aminoate 600 mg three times daily with meals   Deleted: None  Changed: None    Allergies reviewed with patient and updates made in EHR: yes    Medication History Completed By: Maxi Cuadra 6/3/2025 3:20 PM    Prior to Admission medications    Medication Sig Last Dose Taking? Auth Provider Long Term End Date   aspirin (ASA) 81 MG chewable tablet Take 81 mg by mouth at bedtime. 6/1/2025 Yes Reported, Patient     carvedilol (COREG) 6.25 MG tablet Take 6.25 mg by mouth 2 times daily (with meals) 6/1/2025 Bedtime Yes Reported, Patient Yes    Coenzyme Q10 (COQ10 PO) Take by mouth every morning. 5/26/2025 Morning Yes Reported, Patient     levothyroxine (SYNTHROID/LEVOTHROID) 200 MCG tablet Take 1 tablet by mouth every morning (before breakfast). 6/2/2025 at  4:00 AM Yes Reported, Patient Yes    lisinopril (ZESTRIL) 10 MG tablet Take 10 mg by mouth at bedtime. 5/30/2025 Bedtime Yes Reported, Patient No    methoxy PEG-epoetin beta (MIRCERA) 200 MCG/0.3ML SOSY injectable syringe Inject 0.6 mcg/kg subcutaneously once. Given once ~1 month ago and once ~1.5 weeks ago following low  Hgb labs Past Month Morning Yes Unknown, Entered By History     multivitamin w/minerals (MULTI-VITAMIN) tablet Take 1 tablet by mouth daily. Past Week Morning Yes Reported, Patient     Naltrexone HCl, Pain, 4.5 MG CAPS Take 4.5 mg by mouth at bedtime. 5/26/2025 Bedtime Yes Reported, Patient     nitroGLYcerin (NITROSTAT) 0.4 MG sublingual tablet Place 0.4 mg under the tongue every 5 minutes as needed for chest pain. Place 1 tablet under the tongue every 5 minutes as needed More than a month Yes Unknown, Entered By History     Omega-3 Fatty Acids (OMEGA-3 FISH OIL PO) Take 600 mg by mouth daily. Past Week Yes Reported, Patient     rosuvastatin (CRESTOR) 10 MG tablet Take 10 mg by mouth at bedtime. 6/1/2025 Yes Reported, Patient Yes 2/25/26   sevelamer HCl (RENAGEL) 800 MG tablet Take 800 mg by mouth 3 times daily (with meals) 5/30/2025 Yes Reported, Patient     UNABLE TO FIND Take 600 mg by mouth 3 times daily (with meals). Take 2 tablets by mouth three times daily with meals  MEDICATION NAME: Calcium aminoate 300 mg tablets 6/1/2025 Evening Yes Unknown, Entered By History     UNABLE TO FIND Inject 120 mcg subcutaneously every 30 days. MEDICATION NAME: Micera is given monthly as needed based on patient's Hgb results.Last dose End April 2025 More than a month Yes Reported, Patient     UNABLE TO FIND Take 2 capsules by mouth 2 times daily. MEDICATION NAME: arterosil Past Week Yes Reported, Patient     UNABLE TO FIND Take 3 tablets by mouth 2 times daily. MEDICATION NAME: [employer] supplement 5/26/2025 Yes Reported, Patient     vitamin E 400 units TABS Take 400 Units by mouth every morning. 5/26/2025 Yes Reported, Patient

## 2025-06-03 NOTE — PLAN OF CARE
Neuro: A/Ox4, GARG. Left pupil irregular BL. Assist x2 up to chair    CV: SR 60-70's PPM VVI 40, TPM VVI 30   SBP<130 MAP >65. 10 mg hydralazine given for SBP >130    Pulmonary:  RA    GI: Regular diet. Swallow intact    : No mckeon. Oliguric. Plan for PD 6/3 night    Skin:   Preventative mepi on coccyx    Drains:  2 Meds, 1 plural chest tube     Drips:   Insulin off    Labs:      Creat: 10.40  Hgb 10.9    Other:             Goal Outcome Evaluation:      Plan of Care Reviewed With: patient    Overall Patient Progress: improvingOverall Patient Progress: improving    Outcome Evaluation: fast tracked, up to chair in evening. off insulin gtt

## 2025-06-03 NOTE — PROGRESS NOTES
Transferred to: [address] 6411 at 1330  Belongings: Familygathered belongings   Mckeon removed? No: Peritoneal dialysis patient- no mckeon.  Central line removed? Yes  Chart and medications sent with patient Yes  Family notified: Yes    Anna Diez RN

## 2025-06-04 ENCOUNTER — APPOINTMENT (OUTPATIENT)
Dept: OCCUPATIONAL THERAPY | Facility: CLINIC | Age: 75
DRG: 235 | End: 2025-06-04
Attending: SURGERY
Payer: COMMERCIAL

## 2025-06-04 ENCOUNTER — APPOINTMENT (OUTPATIENT)
Dept: GENERAL RADIOLOGY | Facility: CLINIC | Age: 75
DRG: 235 | End: 2025-06-04
Attending: PHYSICIAN ASSISTANT
Payer: COMMERCIAL

## 2025-06-04 LAB
ANION GAP SERPL CALCULATED.3IONS-SCNC: 17 MMOL/L (ref 7–15)
BUN SERPL-MCNC: 48.8 MG/DL (ref 8–23)
CALCIUM SERPL-MCNC: 8.5 MG/DL (ref 8.8–10.4)
CHLORIDE SERPL-SCNC: 96 MMOL/L (ref 98–107)
CREAT SERPL-MCNC: 11.4 MG/DL (ref 0.67–1.17)
EGFRCR SERPLBLD CKD-EPI 2021: 4 ML/MIN/1.73M2
ERYTHROCYTE [DISTWIDTH] IN BLOOD BY AUTOMATED COUNT: 16.9 % (ref 10–15)
GLUCOSE BLDC GLUCOMTR-MCNC: 103 MG/DL (ref 70–99)
GLUCOSE SERPL-MCNC: 103 MG/DL (ref 70–99)
HCO3 SERPL-SCNC: 22 MMOL/L (ref 22–29)
HCT VFR BLD AUTO: 33.8 % (ref 40–53)
HGB BLD-MCNC: 10.8 G/DL (ref 13.3–17.7)
MAGNESIUM SERPL-MCNC: 2.9 MG/DL (ref 1.7–2.3)
MCH RBC QN AUTO: 30.8 PG (ref 26.5–33)
MCHC RBC AUTO-ENTMCNC: 32 G/DL (ref 31.5–36.5)
MCV RBC AUTO: 96 FL (ref 78–100)
MDC_IDC_LEAD_CONNECTION_STATUS: NORMAL
MDC_IDC_LEAD_CONNECTION_STATUS: NORMAL
MDC_IDC_LEAD_IMPLANT_DT: NORMAL
MDC_IDC_LEAD_IMPLANT_DT: NORMAL
MDC_IDC_LEAD_LOCATION: NORMAL
MDC_IDC_LEAD_LOCATION: NORMAL
MDC_IDC_LEAD_LOCATION_DETAIL_1: NORMAL
MDC_IDC_LEAD_LOCATION_DETAIL_1: NORMAL
MDC_IDC_LEAD_MFG: NORMAL
MDC_IDC_LEAD_MFG: NORMAL
MDC_IDC_LEAD_MODEL: NORMAL
MDC_IDC_LEAD_MODEL: NORMAL
MDC_IDC_LEAD_POLARITY_TYPE: NORMAL
MDC_IDC_LEAD_POLARITY_TYPE: NORMAL
MDC_IDC_LEAD_SERIAL: NORMAL
MDC_IDC_LEAD_SERIAL: NORMAL
MDC_IDC_LEAD_SPECIAL_FUNCTION: NORMAL
MDC_IDC_LEAD_SPECIAL_FUNCTION: NORMAL
MDC_IDC_MSMT_BATTERY_DTM: NORMAL
MDC_IDC_MSMT_BATTERY_DTM: NORMAL
MDC_IDC_MSMT_BATTERY_REMAINING_LONGEVITY: 146 MO
MDC_IDC_MSMT_BATTERY_REMAINING_LONGEVITY: 146 MO
MDC_IDC_MSMT_BATTERY_RRT_TRIGGER: NORMAL
MDC_IDC_MSMT_BATTERY_RRT_TRIGGER: NORMAL
MDC_IDC_MSMT_BATTERY_VOLTAGE: 3.02 V
MDC_IDC_MSMT_BATTERY_VOLTAGE: 3.02 V
MDC_IDC_MSMT_CAP_CHARGE_DTM: NORMAL
MDC_IDC_MSMT_CAP_CHARGE_DTM: NORMAL
MDC_IDC_MSMT_CAP_CHARGE_ENERGY: 18 J
MDC_IDC_MSMT_CAP_CHARGE_ENERGY: 18 J
MDC_IDC_MSMT_CAP_CHARGE_TIME: 3.9 S
MDC_IDC_MSMT_CAP_CHARGE_TIME: 3.9 S
MDC_IDC_MSMT_CAP_CHARGE_TYPE: NORMAL
MDC_IDC_MSMT_CAP_CHARGE_TYPE: NORMAL
MDC_IDC_MSMT_LEADCHNL_RV_IMPEDANCE_VALUE: 266 OHM
MDC_IDC_MSMT_LEADCHNL_RV_IMPEDANCE_VALUE: 304 OHM
MDC_IDC_MSMT_LEADCHNL_RV_IMPEDANCE_VALUE: 342 OHM
MDC_IDC_MSMT_LEADCHNL_RV_IMPEDANCE_VALUE: 361 OHM
MDC_IDC_MSMT_LEADCHNL_RV_PACING_THRESHOLD_AMPLITUDE: 1 V
MDC_IDC_MSMT_LEADCHNL_RV_PACING_THRESHOLD_PULSEWIDTH: 0.4 MS
MDC_IDC_MSMT_LEADCHNL_RV_SENSING_INTR_AMPL: 6.9 MV
MDC_IDC_MSMT_LEADCHNL_RV_SENSING_INTR_AMPL: 7.4 MV
MDC_IDC_PG_IMPLANT_DTM: NORMAL
MDC_IDC_PG_IMPLANT_DTM: NORMAL
MDC_IDC_PG_MFG: NORMAL
MDC_IDC_PG_MFG: NORMAL
MDC_IDC_PG_MODEL: NORMAL
MDC_IDC_PG_MODEL: NORMAL
MDC_IDC_PG_SERIAL: NORMAL
MDC_IDC_PG_SERIAL: NORMAL
MDC_IDC_PG_TYPE: NORMAL
MDC_IDC_PG_TYPE: NORMAL
MDC_IDC_SESS_CLINIC_NAME: NORMAL
MDC_IDC_SESS_CLINIC_NAME: NORMAL
MDC_IDC_SESS_DTM: NORMAL
MDC_IDC_SESS_DTM: NORMAL
MDC_IDC_SESS_TYPE: NORMAL
MDC_IDC_SESS_TYPE: NORMAL
MDC_IDC_SET_BRADY_HYSTRATE: NORMAL
MDC_IDC_SET_BRADY_HYSTRATE: NORMAL
MDC_IDC_SET_BRADY_LOWRATE: 40 {BEATS}/MIN
MDC_IDC_SET_BRADY_LOWRATE: 40 {BEATS}/MIN
MDC_IDC_SET_BRADY_MODE: NORMAL
MDC_IDC_SET_BRADY_MODE: NORMAL
MDC_IDC_SET_LEADCHNL_RA_SENSING_SENSITIVITY: NORMAL
MDC_IDC_SET_LEADCHNL_RA_SENSING_SENSITIVITY: NORMAL
MDC_IDC_SET_LEADCHNL_RV_PACING_AMPLITUDE: 2 V
MDC_IDC_SET_LEADCHNL_RV_PACING_AMPLITUDE: 2 V
MDC_IDC_SET_LEADCHNL_RV_PACING_ANODE_ELECTRODE_1: NORMAL
MDC_IDC_SET_LEADCHNL_RV_PACING_ANODE_ELECTRODE_1: NORMAL
MDC_IDC_SET_LEADCHNL_RV_PACING_ANODE_LOCATION_1: NORMAL
MDC_IDC_SET_LEADCHNL_RV_PACING_ANODE_LOCATION_1: NORMAL
MDC_IDC_SET_LEADCHNL_RV_PACING_CAPTURE_MODE: NORMAL
MDC_IDC_SET_LEADCHNL_RV_PACING_CAPTURE_MODE: NORMAL
MDC_IDC_SET_LEADCHNL_RV_PACING_CATHODE_ELECTRODE_1: NORMAL
MDC_IDC_SET_LEADCHNL_RV_PACING_CATHODE_ELECTRODE_1: NORMAL
MDC_IDC_SET_LEADCHNL_RV_PACING_CATHODE_LOCATION_1: NORMAL
MDC_IDC_SET_LEADCHNL_RV_PACING_CATHODE_LOCATION_1: NORMAL
MDC_IDC_SET_LEADCHNL_RV_PACING_POLARITY: NORMAL
MDC_IDC_SET_LEADCHNL_RV_PACING_POLARITY: NORMAL
MDC_IDC_SET_LEADCHNL_RV_PACING_PULSEWIDTH: 0.4 MS
MDC_IDC_SET_LEADCHNL_RV_PACING_PULSEWIDTH: 0.4 MS
MDC_IDC_SET_LEADCHNL_RV_SENSING_ANODE_ELECTRODE_1: NORMAL
MDC_IDC_SET_LEADCHNL_RV_SENSING_ANODE_ELECTRODE_1: NORMAL
MDC_IDC_SET_LEADCHNL_RV_SENSING_ANODE_LOCATION_1: NORMAL
MDC_IDC_SET_LEADCHNL_RV_SENSING_ANODE_LOCATION_1: NORMAL
MDC_IDC_SET_LEADCHNL_RV_SENSING_CATHODE_ELECTRODE_1: NORMAL
MDC_IDC_SET_LEADCHNL_RV_SENSING_CATHODE_ELECTRODE_1: NORMAL
MDC_IDC_SET_LEADCHNL_RV_SENSING_CATHODE_LOCATION_1: NORMAL
MDC_IDC_SET_LEADCHNL_RV_SENSING_CATHODE_LOCATION_1: NORMAL
MDC_IDC_SET_LEADCHNL_RV_SENSING_POLARITY: NORMAL
MDC_IDC_SET_LEADCHNL_RV_SENSING_POLARITY: NORMAL
MDC_IDC_SET_LEADCHNL_RV_SENSING_SENSITIVITY: 0.45 MV
MDC_IDC_SET_LEADCHNL_RV_SENSING_SENSITIVITY: 0.45 MV
MDC_IDC_SET_ZONE_DETECTION_BEATS_DENOMINATOR: 20 {BEATS}
MDC_IDC_SET_ZONE_DETECTION_BEATS_DENOMINATOR: 20 {BEATS}
MDC_IDC_SET_ZONE_DETECTION_BEATS_DENOMINATOR: 32 {BEATS}
MDC_IDC_SET_ZONE_DETECTION_BEATS_DENOMINATOR: 32 {BEATS}
MDC_IDC_SET_ZONE_DETECTION_BEATS_DENOMINATOR: 40 {BEATS}
MDC_IDC_SET_ZONE_DETECTION_BEATS_DENOMINATOR: 40 {BEATS}
MDC_IDC_SET_ZONE_DETECTION_BEATS_NUMERATOR: 20 {BEATS}
MDC_IDC_SET_ZONE_DETECTION_BEATS_NUMERATOR: 20 {BEATS}
MDC_IDC_SET_ZONE_DETECTION_BEATS_NUMERATOR: 30 {BEATS}
MDC_IDC_SET_ZONE_DETECTION_BEATS_NUMERATOR: 30 {BEATS}
MDC_IDC_SET_ZONE_DETECTION_BEATS_NUMERATOR: 32 {BEATS}
MDC_IDC_SET_ZONE_DETECTION_BEATS_NUMERATOR: 32 {BEATS}
MDC_IDC_SET_ZONE_DETECTION_INTERVAL: 240 MS
MDC_IDC_SET_ZONE_DETECTION_INTERVAL: 240 MS
MDC_IDC_SET_ZONE_DETECTION_INTERVAL: 320 MS
MDC_IDC_SET_ZONE_DETECTION_INTERVAL: 320 MS
MDC_IDC_SET_ZONE_DETECTION_INTERVAL: 370 MS
MDC_IDC_SET_ZONE_DETECTION_INTERVAL: 370 MS
MDC_IDC_SET_ZONE_DETECTION_INTERVAL: 400 MS
MDC_IDC_SET_ZONE_DETECTION_INTERVAL: 400 MS
MDC_IDC_SET_ZONE_STATUS: NORMAL
MDC_IDC_SET_ZONE_TYPE: NORMAL
MDC_IDC_SET_ZONE_VENDOR_TYPE: NORMAL
MDC_IDC_STAT_AT_BURDEN_PERCENT: 0 %
MDC_IDC_STAT_AT_BURDEN_PERCENT: 0 %
MDC_IDC_STAT_AT_DTM_END: NORMAL
MDC_IDC_STAT_AT_DTM_END: NORMAL
MDC_IDC_STAT_AT_DTM_START: NORMAL
MDC_IDC_STAT_AT_DTM_START: NORMAL
MDC_IDC_STAT_BRADY_DTM_END: NORMAL
MDC_IDC_STAT_BRADY_DTM_END: NORMAL
MDC_IDC_STAT_BRADY_DTM_START: NORMAL
MDC_IDC_STAT_BRADY_DTM_START: NORMAL
MDC_IDC_STAT_BRADY_RV_PERCENT_PACED: 0.01 %
MDC_IDC_STAT_BRADY_RV_PERCENT_PACED: 10.42 %
MDC_IDC_STAT_EPISODE_RECENT_COUNT: 0
MDC_IDC_STAT_EPISODE_RECENT_COUNT_DTM_END: NORMAL
MDC_IDC_STAT_EPISODE_RECENT_COUNT_DTM_START: NORMAL
MDC_IDC_STAT_EPISODE_TOTAL_COUNT: 0
MDC_IDC_STAT_EPISODE_TOTAL_COUNT_DTM_END: NORMAL
MDC_IDC_STAT_EPISODE_TOTAL_COUNT_DTM_START: NORMAL
MDC_IDC_STAT_EPISODE_TYPE: NORMAL
MDC_IDC_STAT_TACHYTHERAPY_ATP_DELIVERED_RECENT: 0
MDC_IDC_STAT_TACHYTHERAPY_ATP_DELIVERED_RECENT: 0
MDC_IDC_STAT_TACHYTHERAPY_ATP_DELIVERED_TOTAL: 0
MDC_IDC_STAT_TACHYTHERAPY_ATP_DELIVERED_TOTAL: 0
MDC_IDC_STAT_TACHYTHERAPY_RECENT_DTM_END: NORMAL
MDC_IDC_STAT_TACHYTHERAPY_RECENT_DTM_END: NORMAL
MDC_IDC_STAT_TACHYTHERAPY_RECENT_DTM_START: NORMAL
MDC_IDC_STAT_TACHYTHERAPY_RECENT_DTM_START: NORMAL
MDC_IDC_STAT_TACHYTHERAPY_SHOCKS_ABORTED_RECENT: 0
MDC_IDC_STAT_TACHYTHERAPY_SHOCKS_ABORTED_RECENT: 0
MDC_IDC_STAT_TACHYTHERAPY_SHOCKS_ABORTED_TOTAL: 0
MDC_IDC_STAT_TACHYTHERAPY_SHOCKS_ABORTED_TOTAL: 0
MDC_IDC_STAT_TACHYTHERAPY_SHOCKS_DELIVERED_RECENT: 0
MDC_IDC_STAT_TACHYTHERAPY_SHOCKS_DELIVERED_RECENT: 0
MDC_IDC_STAT_TACHYTHERAPY_SHOCKS_DELIVERED_TOTAL: 0
MDC_IDC_STAT_TACHYTHERAPY_SHOCKS_DELIVERED_TOTAL: 0
MDC_IDC_STAT_TACHYTHERAPY_TOTAL_DTM_END: NORMAL
MDC_IDC_STAT_TACHYTHERAPY_TOTAL_DTM_END: NORMAL
MDC_IDC_STAT_TACHYTHERAPY_TOTAL_DTM_START: NORMAL
MDC_IDC_STAT_TACHYTHERAPY_TOTAL_DTM_START: NORMAL
PHOSPHATE SERPL-MCNC: 5.9 MG/DL (ref 2.5–4.5)
PLATELET # BLD AUTO: 224 10E3/UL (ref 150–450)
POTASSIUM SERPL-SCNC: 4.3 MMOL/L (ref 3.4–5.3)
RBC # BLD AUTO: 3.51 10E6/UL (ref 4.4–5.9)
SODIUM SERPL-SCNC: 135 MMOL/L (ref 135–145)
WBC # BLD AUTO: 8.5 10E3/UL (ref 4–11)

## 2025-06-04 PROCEDURE — 99233 SBSQ HOSP IP/OBS HIGH 50: CPT | Mod: 24 | Performed by: INTERNAL MEDICINE

## 2025-06-04 PROCEDURE — 84132 ASSAY OF SERUM POTASSIUM: CPT | Performed by: PHYSICIAN ASSISTANT

## 2025-06-04 PROCEDURE — 85027 COMPLETE CBC AUTOMATED: CPT | Performed by: PHYSICIAN ASSISTANT

## 2025-06-04 PROCEDURE — 36415 COLL VENOUS BLD VENIPUNCTURE: CPT | Performed by: PHYSICIAN ASSISTANT

## 2025-06-04 PROCEDURE — 71046 X-RAY EXAM CHEST 2 VIEWS: CPT

## 2025-06-04 PROCEDURE — 250N000011 HC RX IP 250 OP 636: Performed by: SURGERY

## 2025-06-04 PROCEDURE — 71046 X-RAY EXAM CHEST 2 VIEWS: CPT | Mod: 26 | Performed by: RADIOLOGY

## 2025-06-04 PROCEDURE — 250N000013 HC RX MED GY IP 250 OP 250 PS 637: Performed by: PHYSICIAN ASSISTANT

## 2025-06-04 PROCEDURE — 83735 ASSAY OF MAGNESIUM: CPT | Performed by: SURGERY

## 2025-06-04 PROCEDURE — 97535 SELF CARE MNGMENT TRAINING: CPT | Mod: GO

## 2025-06-04 PROCEDURE — 71045 X-RAY EXAM CHEST 1 VIEW: CPT

## 2025-06-04 PROCEDURE — 272N000004 HC RX 272: Performed by: STUDENT IN AN ORGANIZED HEALTH CARE EDUCATION/TRAINING PROGRAM

## 2025-06-04 PROCEDURE — 82310 ASSAY OF CALCIUM: CPT | Performed by: PHYSICIAN ASSISTANT

## 2025-06-04 PROCEDURE — 97530 THERAPEUTIC ACTIVITIES: CPT | Mod: GO

## 2025-06-04 PROCEDURE — 250N000013 HC RX MED GY IP 250 OP 250 PS 637: Performed by: SURGERY

## 2025-06-04 PROCEDURE — 120N000003 HC R&B IMCU UMMC

## 2025-06-04 PROCEDURE — 71045 X-RAY EXAM CHEST 1 VIEW: CPT | Mod: 26 | Performed by: RADIOLOGY

## 2025-06-04 PROCEDURE — 84100 ASSAY OF PHOSPHORUS: CPT | Performed by: SURGERY

## 2025-06-04 PROCEDURE — 999N000090 HC STATISTIC LEVEL 2 EST PATIENT

## 2025-06-04 RX ORDER — ASPIRIN 81 MG/1
162 TABLET, CHEWABLE ORAL DAILY
Status: DISPENSED | OUTPATIENT
Start: 2025-06-05

## 2025-06-04 RX ORDER — LACTULOSE 10 G/10G
20 SOLUTION ORAL ONCE
Status: COMPLETED | OUTPATIENT
Start: 2025-06-04 | End: 2025-06-04

## 2025-06-04 RX ORDER — CARVEDILOL 3.12 MG/1
3.12 TABLET ORAL ONCE
Status: COMPLETED | OUTPATIENT
Start: 2025-06-04 | End: 2025-06-04

## 2025-06-04 RX ORDER — CARVEDILOL 6.25 MG/1
6.25 TABLET ORAL 2 TIMES DAILY WITH MEALS
Status: DISCONTINUED | OUTPATIENT
Start: 2025-06-04 | End: 2025-06-05

## 2025-06-04 RX ORDER — GENTAMICIN SULFATE 1 MG/G
CREAM TOPICAL DAILY PRN
Status: DISCONTINUED | OUTPATIENT
Start: 2025-06-04 | End: 2025-06-05

## 2025-06-04 RX ADMIN — OXYCODONE HYDROCHLORIDE 5 MG: 5 TABLET ORAL at 10:14

## 2025-06-04 RX ADMIN — ROSUVASTATIN CALCIUM 10 MG: 10 TABLET, FILM COATED ORAL at 08:38

## 2025-06-04 RX ADMIN — ACETAMINOPHEN 975 MG: 325 TABLET, FILM COATED ORAL at 23:55

## 2025-06-04 RX ADMIN — CARVEDILOL 3.12 MG: 3.12 TABLET, FILM COATED ORAL at 08:38

## 2025-06-04 RX ADMIN — OXYCODONE HYDROCHLORIDE 5 MG: 5 TABLET ORAL at 06:03

## 2025-06-04 RX ADMIN — ACETAMINOPHEN 975 MG: 325 TABLET, FILM COATED ORAL at 16:52

## 2025-06-04 RX ADMIN — SODIUM CHLORIDE, SODIUM LACTATE, CALCIUM CHLORIDE, MAGNESIUM CHLORIDE AND DEXTROSE: 2.5; 538; 448; 18.3; 5.08 INJECTION, SOLUTION INTRAPERITONEAL at 21:27

## 2025-06-04 RX ADMIN — PANTOPRAZOLE SODIUM 40 MG: 40 TABLET, DELAYED RELEASE ORAL at 08:38

## 2025-06-04 RX ADMIN — CARVEDILOL 3.12 MG: 3.12 TABLET, FILM COATED ORAL at 11:02

## 2025-06-04 RX ADMIN — SENNOSIDES AND DOCUSATE SODIUM 1 TABLET: 50; 8.6 TABLET ORAL at 08:38

## 2025-06-04 RX ADMIN — LACTULOSE 20 G: 20 POWDER, FOR SOLUTION ORAL at 13:00

## 2025-06-04 RX ADMIN — ACETAMINOPHEN 975 MG: 325 TABLET, FILM COATED ORAL at 06:03

## 2025-06-04 RX ADMIN — OXYCODONE HYDROCHLORIDE 5 MG: 5 TABLET ORAL at 01:59

## 2025-06-04 RX ADMIN — HEPARIN SODIUM 5000 UNITS: 5000 INJECTION, SOLUTION INTRAVENOUS; SUBCUTANEOUS at 19:45

## 2025-06-04 RX ADMIN — OXYCODONE HYDROCHLORIDE 5 MG: 5 TABLET ORAL at 17:00

## 2025-06-04 RX ADMIN — SODIUM CHLORIDE, SODIUM LACTATE, CALCIUM CHLORIDE, MAGNESIUM CHLORIDE AND DEXTROSE: 1.5; 538; 448; 18.3; 5.08 INJECTION, SOLUTION INTRAPERITONEAL at 21:27

## 2025-06-04 RX ADMIN — SENNOSIDES AND DOCUSATE SODIUM 1 TABLET: 50; 8.6 TABLET ORAL at 19:45

## 2025-06-04 RX ADMIN — LEVOTHYROXINE SODIUM 200 MCG: 0.15 TABLET ORAL at 08:38

## 2025-06-04 RX ADMIN — ASPIRIN 81 MG CHEWABLE TABLET 162 MG: 81 TABLET CHEWABLE at 08:38

## 2025-06-04 RX ADMIN — HEPARIN SODIUM 5000 UNITS: 5000 INJECTION, SOLUTION INTRAVENOUS; SUBCUTANEOUS at 11:02

## 2025-06-04 RX ADMIN — CARVEDILOL 6.25 MG: 6.25 TABLET, FILM COATED ORAL at 17:36

## 2025-06-04 RX ADMIN — POLYETHYLENE GLYCOL 3350 17 G: 17 POWDER, FOR SOLUTION ORAL at 08:38

## 2025-06-04 RX ADMIN — HEPARIN SODIUM 5000 UNITS: 5000 INJECTION, SOLUTION INTRAVENOUS; SUBCUTANEOUS at 04:56

## 2025-06-04 RX ADMIN — MAGNESIUM HYDROXIDE 30 ML: 400 SUSPENSION ORAL at 11:02

## 2025-06-04 ASSESSMENT — ACTIVITIES OF DAILY LIVING (ADL)
ADLS_ACUITY_SCORE: 41
ADLS_ACUITY_SCORE: 41
ADLS_ACUITY_SCORE: 39
ADLS_ACUITY_SCORE: 36
ADLS_ACUITY_SCORE: 35
ADLS_ACUITY_SCORE: 36
ADLS_ACUITY_SCORE: 41
ADLS_ACUITY_SCORE: 35
ADLS_ACUITY_SCORE: 41
ADLS_ACUITY_SCORE: 36
DEPENDENT_IADLS:: INDEPENDENT
ADLS_ACUITY_SCORE: 39
ADLS_ACUITY_SCORE: 35
ADLS_ACUITY_SCORE: 36
ADLS_ACUITY_SCORE: 41
ADLS_ACUITY_SCORE: 36
ADLS_ACUITY_SCORE: 41

## 2025-06-04 NOTE — PLAN OF CARE
Goal Outcome Evaluation:      Plan of Care Reviewed With: patient    Overall Patient Progress: improving    [id number]    Diagnosis: s/p CABG x3 on 6/2     Neuro: A&O x4,   Cardiac: NSR. Denied chest pain, dizziness, palpitations. VSS, afebrile. TPW on  VVI 30.  Respiratory: RA, sating >95%. Denied SOB.   GI/: No BM since surgery. PD patient, oliguric.   Endocrine: restarting PD tonight.   Diet/Appetite: Regular diet  Skin: midsternal incision covered w/ dry gauze, CT sites CDI. See flowsheet for current skin assessment. No new deficits noted since transferred.   LDA: Right PIV x2 SL. CT x3 to negative suction with intermittent air leak.   Activity: Ax2 w/walker and gait belt.   Pain: Incisional pain reported. Taking scheduled tylenol, pain in controlled with current regimen.   Plan: Continue with POC.  Notify CVTS of pertinent changes.

## 2025-06-04 NOTE — PLAN OF CARE
Goal Outcome Evaluation:      Plan of Care Reviewed With: patient    Overall Patient Progress: improving    [id number]     Diagnosis: s/p CABG x3 on 6/2  Shift changes:   -Chest tubes: 2 mediastinals and Left pleural removed 6/4  -TPW: removed 6/4    Neuro: A&O x4, able to make needs known.   Cardiac: NSR. Denied chest pain, dizziness, palpitations. VSS, afebrile.   Respiratory: RA, sating >95%. Denied SOB.   GI/: No BM yet, patient reported passing gas. PD patient, oliguric.   Endocrine: On PD  Diet/Appetite: Regular diet, good appetite.   Skin: CT sites covered with dry gauze C/D/I   LDA: Right PIV x2 SL.   Activity: Ax1 w/walker and gait belt. Ambulated outside room x2.   Pain: Incisional pain reported. Given PRN oxy x2.    Plan: Continue with POC.  Notify CVTS of pertinent changes.

## 2025-06-04 NOTE — PROGRESS NOTES
POST PERITONEAL DIALYSIS TREATMENT NOTE      Date:  6/4/2025  Time:  10:41 AM    Data: Overnight PD Therapy:   Total Treatment Volume mL:73228   Total Duration of Therapy hours: 12   Fill Volume mL: 2000 mL  Total Number of Cycles: 6  Final Fill:Volume: 0 mL, No Final Fill with no additives:  PD Dressing Change: No, Gentamicin 0.1% Cream: No  Patient tolerated PD therapy well. VSS  Weight:76.6 kg (168 lb 14.4 oz)kg Bed Scale   Initial Drain mL: 126 mL  UF Fluid Removed:  126 mlmL  UF Fluid Added:  -289 mL  Average Dwell Time Minutes: 98   Added or lost dwell time:  None Dwell Time Minutes: 0  Effluent description:  Yellow, Fibrin   Other Comments: Pt did not complete PD treatment. PD Cycler was on last drain but Pt assumed the treatment to be completed and disconnected himself from the cycler. PD RN will educate Pt later this evening about PD therapy.      Assessment/Interventions:   Patient stable, VSS, Patient assessed and patient knowledgeable and is able to safely connect and disconnect PD independently.  Will continue to assess and educate patient about PD cares and therapy as needed     Plan:      Will follow per renal team. Continue to assess how patient tolerates PD therapy. PD RN available for questions at pager: [phone].

## 2025-06-04 NOTE — PLAN OF CARE
[phone]  Neuro: A&Ox4. Able to make needs known properly   Cardiac: SR. VSS. ICD and temp. Pacer wires.   Respiratory: Sating >92% on RA.  GI/: No urine OP on PD no BM overnight.  Diet: Tolerating reg diet.   Activity:  Assist of 1-2.  Pain: PRN oxy given for pain.    Skin: No new deficits noted.  LDA's:  PIV x2 to right arm SL, 3 chest tubes to one atrium at -20 suction, minimal intermittent air leak.   Plan: Continue with POC. Notify primary team with changes.

## 2025-06-04 NOTE — PROGRESS NOTES
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     ESRD on PD  ESRD 2/2 MPO ANCA vasculitis   Home dialysis unit in [place], [place]. Primary nephrologist Dr. Valdivia. Access w PD catheter.  Dry weight 74.5 kg.  First date of dialysis: 7/17/20. Last session prior to admit: evening of 6/1/25.  Outpatient PD Rx:   Total fill vol: 15.6 L  Cycler time: 12 h  Number of exchanges: 6  Fill vol: 2600 mL  No day dwell    Tonight's PD Rx:  Total fill vol: 12 L  Mix yellow and green bags  Cycler time: 12 h  Number of exchanges: 6  Fill vol: 2000 mL  No day dwell     Anemia: hgb 10.8, ferritin 1174, Tsat 28%. On NGUYEN & IV Fe outpatient   Volume/HTN: BP appropriate, euvolemic   Acidosis: bicarb 22  MBD: Ca  8.5, phos 5.9     #CAD s/p CABG x3 (6/2/25)    Recommendations were communicated to primary team via note & vocera    Seen and discussed with Dr. Richi Alcantar MD   Division of Renal Disease and Hypertension  [facility]  [employer]    SUBJECTIVE     Interval History :   Nursing and provider notes from last 24 hours reviewed.  In the last 24 hours Cesar Rashid   Tolerated PD last night without issue, did come off a little early   Feels slightly swollen     A comprehensive review of systems was negative except as noted above.    OBJECTIVE     Physical Exam:   I/O last 3 completed shifts:  In: 661 [P.O.:950]  Out: 226 [Other:126; Chest Tube:100]   /74 (BP Location: Left arm, Cuff Size: Adult Regular)   Pulse 74   Temp 98.1  F (36.7  C) (Oral)   Resp 18   Ht 1.753 m (5' 9\")   Wt 76.6 kg (168 lb 14.4 oz)   SpO2 95%   BMI 24.94 kg/m       General: up in chair   HEENT: mmm  Cardiac: rrr  Pulmonary:unlabored  Abdominal: nontender  Extremities: trace LE edema   Neuro: A&Ox4    Labs:   All labs reviewed by " me  Electrolytes/Renal -   Recent Labs   Lab Test 06/04/25  0717 06/04/25  0550 06/03/25  2107 06/03/25  0617 06/03/25  0505 06/02/25  1820 06/02/25  1818   NA  --  135  --   --  135  --  135   POTASSIUM  --  4.3  --   --  4.9  --  4.3   CHLORIDE  --  96*  --   --  97*  --  96*   CO2  --  22  --   --  19*  --  19*   BUN  --  48.8*  --   --  42.8*  --  36.5*   CR  --  11.40*  --   --  10.40*  --  9.38*   * 103* 161*   < > 112*   < > 159*   ISIDRO  --  8.5*  --   --  8.9  --  9.1   MAG  --  2.9*  --   --  3.0*  --  3.3*   PHOS  --  5.9*  --   --  5.7*  --  4.8*    < > = values in this interval not displayed.       CBC -   Recent Labs   Lab Test 06/04/25  0550 06/03/25  0505 06/02/25  1818   WBC 8.5 7.3 9.1   HGB 10.8* 10.9* 10.9*    227 224       LFTs -   Recent Labs   Lab Test 06/03/25  0505 06/02/25  1334 05/28/25  1050   ALKPHOS 69 59 96   BILITOTAL 0.3 0.5 0.3   ALT 6 13 16   AST 28 29 24   PROTTOTAL 5.2* 4.6* 6.3*   ALBUMIN 3.0* 2.8* 3.1*       Iron Panel -   Recent Labs   Lab Test 05/31/25  1159 05/28/25  1050 05/25/25  1212 05/04/25  1055 04/29/25  1530   IRON 38* 47* 46*   < > 112   IRONSAT 28 28 30   < > 52*   MAR  --   --   --   --  1,174*    < > = values in this interval not displayed.     Current Medications:  Current Facility-Administered Medications   Medication Dose Route Frequency Provider Last Rate Last Admin    acetaminophen (TYLENOL) tablet 975 mg  975 mg Oral or Feeding Tube Q8H Zafar Allan MD   975 mg at 06/04/25 0603    [START ON 6/5/2025] aspirin EC tablet 162 mg  162 mg Oral Daily Attila Brewer PA        carvedilol (COREG) tablet 6.25 mg  6.25 mg Oral BID w/meals Attila Brewer PA        heparin ANTICOAGULANT injection 5,000 Units  5,000 Units Subcutaneous Q8H Zafar Allan MD   5,000 Units at 06/04/25 1102    [START ON 6/5/2025] levothyroxine (SYNTHROID/LEVOTHROID) tablet 200 mcg  200 mcg Oral QAM AC Attila Brewer PA        pantoprazole (PROTONIX) EC  tablet 40 mg  40 mg Oral Daily Zafar Allan MD   40 mg at 06/04/25 0838    polyethylene glycol (MIRALAX) Packet 17 g  17 g Oral or Feeding Tube Daily Zafar Allan MD   17 g at 06/04/25 0838    rosuvastatin (CRESTOR) tablet 10 mg  10 mg Oral Daily Paul Masterson PA-C   10 mg at 06/04/25 0838    senna-docusate (SENOKOT-S/PERICOLACE) 8.6-50 MG per tablet 1 tablet  1 tablet Oral or Feeding Tube BID Zafar Allan MD   1 tablet at 06/04/25 0838    sodium chloride (PF) 0.9% PF flush 3 mL  3 mL Intracatheter Q8H LEEANNA Zafar Allan MD   3 mL at 06/04/25 1300     Current Facility-Administered Medications   Medication Dose Route Frequency Provider Last Rate Last Admin    dianeal PD LOW calcium-1.5% dex (calcium 2.5 mEq/L) PERITONEAL DIALYSATE   Dialysis PERITONEAL DIALYSIS Nasir Alcantar MD        dianeal PD LOW calcium-2.5% dex (calcium 2.5 mEq/L) PERITONEAL DIALYSATE   Dialysis PERITONEAL DIALYSIS Nasir Alcantar MD Samuel Weinberg, MD

## 2025-06-04 NOTE — CONSULTS
Care Management Initial Consult    General Information  Assessment completed with: Patient, Spouse or significant other,    Type of CM/SW Visit: Initial Assessment    Primary Care Provider verified and updated as needed: Yes   Readmission within the last 30 days:        Reason for Consult: discharge planning    Communication Assessment  Patient's communication style: spoken language (English or Bilingual)    Hearing Difficulty or Deaf: no   Wear Glasses or Blind: yes    Cognitive  Cognitive/Neuro/Behavioral: unchanged from my previous assessment  Level of Consciousness: alert  Arousal Level: opens eyes spontaneously  Orientation: oriented x 4  Mood/Behavior: calm, cooperative  Best Language: 0 - No aphasia  Speech: logical, clear    Living Environment:   People in home: spouse  Rafaela  Current living Arrangements: house      Able to return to prior arrangements: yes       Family/Social Support:  Care provided by: self, spouse/significant other  Provides care for: no one  Marital Status:   Support system: Wife, Children          Description of Support System: Supportive, Involved    Support Assessment: Adequate family and caregiver support    Current Resources:   Patient receiving home care services: No  Community Resources: Dialysis Services  Equipment currently used at home: none  Supplies currently used at home: None    Employment/Financial:  Employment Status: retired        Financial Concerns: none      Lifestyle & Psychosocial Needs:  Social Drivers of Health     Food Insecurity: High Risk (6/3/2025)    Food Insecurity     Within the past 12 months, did you worry that your food would run out before you got money to buy more?: Yes     Within the past 12 months, did the food you bought just not last and you didn t have money to get more?: Yes   Depression: Not at risk (4/14/2025)    PHQ-2     PHQ-2 Score: 0   Housing Stability: High Risk (6/3/2025)    Housing Stability     Do you have housing? : No     Are  you worried about losing your housing?: No   Tobacco Use: Low Risk  (5/28/2025)    Patient History     Smoking Tobacco Use: Never     Smokeless Tobacco Use: Never     Passive Exposure: Never   Financial Resource Strain: Low Risk  (6/3/2025)    Financial Resource Strain     Within the past 12 months, have you or your family members you live with been unable to get utilities (heat, electricity) when it was really needed?: No   Alcohol Use: Not on file   Transportation Needs: High Risk (6/3/2025)    Transportation Needs     Within the past 12 months, has lack of transportation kept you from medical appointments, getting your medicines, non-medical meetings or appointments, work, or from getting things that you need?: Yes   Physical Activity: Not on File (8/2/2024)    Received from [employer]    Physical Activity     Physical Activity: 0   Interpersonal Safety: Low Risk  (6/2/2025)    Interpersonal Safety     Do you feel physically and emotionally safe where you currently live?: Yes     Within the past 12 months, have you been hit, slapped, kicked or otherwise physically hurt by someone?: No     Within the past 12 months, have you been humiliated or emotionally abused in other ways by your partner or ex-partner?: No   Stress: Not on File (8/2/2024)    Received from [employer]    Stress     Stress: 0   Social Connections: Not on File (9/16/2024)    Received from [employer]    Social Connections     Connectedness: 0   Health Literacy: Not on file     Functional Status:  Prior to admission patient needed assistance:   Dependent ADLs:: Independent  Dependent IADLs:: Independent    Values/Beliefs:  Spiritual, Cultural Beliefs, Anabaptist Practices, Values that affect care:    Description of Beliefs that Will Affect Care: Scientology      Additional Information:  Met with patient and wife at bedside to discuss discharge planning. OT recommending discharge to home with assist and OP CR. Pt and spouse state that they have good family  support and their two adult children live nearby. Discussed location options for outpatient cardiac rehab- they prefer the [facility]. Pt on home PD prior to admission. Pt and spouse state they have all the home PD supplies they need. They requested RNCC reach out and update pt's dialysis unit.     [facility] (Home PD)  Phone: [phone]  Fax: [phone]  Called and updated nurseZoila of anticipated discharge in 1-2 days. Zoila requested an update once date confirmed and to have discharge summary fax.    [facility] (Outpatient Cardiac Rehab)  Phone: [phone]  Fax: [phone]  Faxed cardiac rehab referral and placed contact info on AVS.    Next Steps:   Please update Ani Shirley once discharge date confirmed and fax discharge summary.     CC will continue to monitor patient's medical condition and progress towards discharge.  Tiffanie Tse RN BSN  6C Unit Care Coordinator  Phone number: [phone]    SEARCHABLE in [facility] - search CARE COORDINATOR      [place] [facility] [place] ([phone]) Saturday & Sunday; ([phone]) [facility] Recognized Holidays      Units: 5A Onc Vocera & 5C Vocera      Units: 6B Vocera & 6C Vocera       Units: 7A SOT RNCC Vocera, 7B Med Surg Vocera, & 7C Med Surg Vocera      Units: 6A Vocera & 4A CVICU Vocera, 4C MICU Vocera, and 4E SICU Vocera       Units: 5 Ortho Vocera & 5 Med Surg Vocera      Units: 6 Med Surg Vocera & 8 Med Surg Vocera

## 2025-06-05 ENCOUNTER — APPOINTMENT (OUTPATIENT)
Dept: GENERAL RADIOLOGY | Facility: CLINIC | Age: 75
DRG: 235 | End: 2025-06-05
Attending: PHYSICIAN ASSISTANT
Payer: COMMERCIAL

## 2025-06-05 ENCOUNTER — APPOINTMENT (OUTPATIENT)
Dept: OCCUPATIONAL THERAPY | Facility: CLINIC | Age: 75
DRG: 235 | End: 2025-06-05
Attending: SURGERY
Payer: COMMERCIAL

## 2025-06-05 VITALS
DIASTOLIC BLOOD PRESSURE: 77 MMHG | TEMPERATURE: 98.1 F | HEART RATE: 74 BPM | BODY MASS INDEX: 25.02 KG/M2 | WEIGHT: 168.9 LBS | OXYGEN SATURATION: 98 % | HEIGHT: 69 IN | RESPIRATION RATE: 18 BRPM | SYSTOLIC BLOOD PRESSURE: 125 MMHG

## 2025-06-05 PROCEDURE — 250N000013 HC RX MED GY IP 250 OP 250 PS 637: Performed by: SURGERY

## 2025-06-05 PROCEDURE — 97530 THERAPEUTIC ACTIVITIES: CPT | Mod: GO

## 2025-06-05 PROCEDURE — 99233 SBSQ HOSP IP/OBS HIGH 50: CPT | Mod: 24 | Performed by: INTERNAL MEDICINE

## 2025-06-05 PROCEDURE — 250N000013 HC RX MED GY IP 250 OP 250 PS 637: Performed by: PHYSICIAN ASSISTANT

## 2025-06-05 PROCEDURE — 999N000092 HC STATISTIC LEVEL 4 EST PATIENT

## 2025-06-05 PROCEDURE — 120N000003 HC R&B IMCU UMMC

## 2025-06-05 PROCEDURE — 97535 SELF CARE MNGMENT TRAINING: CPT | Mod: GO

## 2025-06-05 PROCEDURE — 272N000004 HC RX 272: Performed by: STUDENT IN AN ORGANIZED HEALTH CARE EDUCATION/TRAINING PROGRAM

## 2025-06-05 PROCEDURE — 71045 X-RAY EXAM CHEST 1 VIEW: CPT | Mod: 26 | Performed by: RADIOLOGY

## 2025-06-05 PROCEDURE — 71045 X-RAY EXAM CHEST 1 VIEW: CPT

## 2025-06-05 PROCEDURE — 250N000011 HC RX IP 250 OP 636: Performed by: SURGERY

## 2025-06-05 RX ORDER — OXYCODONE HYDROCHLORIDE 5 MG/1
5 TABLET ORAL EVERY 4 HOURS PRN
Refills: 0 | Status: ACTIVE | OUTPATIENT
Start: 2025-06-05

## 2025-06-05 RX ORDER — CARVEDILOL 6.25 MG/1
6.25 TABLET ORAL 2 TIMES DAILY
Status: DISPENSED | OUTPATIENT
Start: 2025-06-05

## 2025-06-05 RX ORDER — GENTAMICIN SULFATE 1 MG/G
CREAM TOPICAL DAILY PRN
Status: ACTIVE | OUTPATIENT
Start: 2025-06-05

## 2025-06-05 RX ADMIN — CARVEDILOL 6.25 MG: 6.25 TABLET, FILM COATED ORAL at 08:03

## 2025-06-05 RX ADMIN — ASPIRIN 162 MG: 81 TABLET ORAL at 08:03

## 2025-06-05 RX ADMIN — POLYETHYLENE GLYCOL 3350 17 G: 17 POWDER, FOR SOLUTION ORAL at 08:03

## 2025-06-05 RX ADMIN — HEPARIN SODIUM 5000 UNITS: 5000 INJECTION, SOLUTION INTRAVENOUS; SUBCUTANEOUS at 12:19

## 2025-06-05 RX ADMIN — SENNOSIDES AND DOCUSATE SODIUM 1 TABLET: 50; 8.6 TABLET ORAL at 19:57

## 2025-06-05 RX ADMIN — PANTOPRAZOLE SODIUM 40 MG: 40 TABLET, DELAYED RELEASE ORAL at 08:03

## 2025-06-05 RX ADMIN — HEPARIN SODIUM 5000 UNITS: 5000 INJECTION, SOLUTION INTRAVENOUS; SUBCUTANEOUS at 19:57

## 2025-06-05 RX ADMIN — ROSUVASTATIN CALCIUM 10 MG: 10 TABLET, FILM COATED ORAL at 08:03

## 2025-06-05 RX ADMIN — SENNOSIDES AND DOCUSATE SODIUM 1 TABLET: 50; 8.6 TABLET ORAL at 08:03

## 2025-06-05 RX ADMIN — CARVEDILOL 6.25 MG: 6.25 TABLET, FILM COATED ORAL at 19:57

## 2025-06-05 RX ADMIN — ACETAMINOPHEN 975 MG: 325 TABLET, FILM COATED ORAL at 08:03

## 2025-06-05 RX ADMIN — SODIUM CHLORIDE, SODIUM LACTATE, CALCIUM CHLORIDE, MAGNESIUM CHLORIDE AND DEXTROSE: 2.5; 538; 448; 18.3; 5.08 INJECTION, SOLUTION INTRAPERITONEAL at 20:09

## 2025-06-05 RX ADMIN — HEPARIN SODIUM 5000 UNITS: 5000 INJECTION, SOLUTION INTRAVENOUS; SUBCUTANEOUS at 04:35

## 2025-06-05 RX ADMIN — SODIUM CHLORIDE, SODIUM LACTATE, CALCIUM CHLORIDE, MAGNESIUM CHLORIDE AND DEXTROSE: 1.5; 538; 448; 18.3; 5.08 INJECTION, SOLUTION INTRAPERITONEAL at 20:08

## 2025-06-05 RX ADMIN — BISACODYL 10 MG: 10 SUPPOSITORY RECTAL at 14:04

## 2025-06-05 RX ADMIN — ACETAMINOPHEN 975 MG: 325 TABLET, FILM COATED ORAL at 23:48

## 2025-06-05 RX ADMIN — LEVOTHYROXINE SODIUM 200 MCG: 0.15 TABLET ORAL at 08:03

## 2025-06-05 RX ADMIN — MAGNESIUM HYDROXIDE 30 ML: 400 SUSPENSION ORAL at 15:31

## 2025-06-05 RX ADMIN — ACETAMINOPHEN 975 MG: 325 TABLET, FILM COATED ORAL at 14:08

## 2025-06-05 ASSESSMENT — ACTIVITIES OF DAILY LIVING (ADL)
ADLS_ACUITY_SCORE: 41

## 2025-06-05 NOTE — PROGRESS NOTES
POST PERITONEAL DIALYSIS TREATMENT NOTE      Date:  6/5/2025  Time:  09:30 AM    Data: Overnight PD Therapy:   Total Treatment Volume mL:39540   Total Duration of Therapy hours: 12   Fill Volume mL: 2000 mL  Total Number of Cycles: 6  Final Fill:Volume: 0 mLmL, No Final Fill with no additives:  PD Dressing Change: No, Gentamicin 0.1% Cream: No  Patient tolerated PD therapy well. VSS  Weight:76.6 kg (168 lb 14.4 oz)kg Standing     Initial Drain mL: 140 mL  UF Fluid Removed:  565 mlmL  UF Fluid Added:  0 mlmL  Average Dwell Time Minutes:  93 minutes  Added or lost dwell time:  Lost Dwell Time Minutes: 9  Effluent description:  Clear, Yellow  Other Comments::      Assessment/Interventions:   Patient stable, VSS, Patient assessed and patient knowledgeable and is able to safely connect and disconnect PD independently.  Will continue to assess and educate patient about PD cares and therapy as needed. Patient states that CV surgery plans to discharge him tomorrow. Dr. Alcantar notified about potential discharge and will adjust patient fill volume to home PD prescription.     Plan:      Will follow per renal team. Continue to assess how patient tolerates PD therapy. PD RN available for questions at pager: [phone].

## 2025-06-05 NOTE — PLAN OF CARE
[id number]  Neuro: A&Ox4. Able to make needs known properly.   Cardiac: SR. VSS. Denies cardiac chest pain.   Respiratory: Sating >95% on RA.  GI/: On PD no urine output. No BM overnight.  Diet: Tolerating reg diet.   Activity:  Assist of 1 w/ GB and walker  Pain: At acceptable level on current regimen.   Skin: No new deficits noted.  LDA's:  PIV x2 to right arm SL, PD port to abdomen.   Plan: Continue with POC. Notify primary team with changes.

## 2025-06-05 NOTE — PROGRESS NOTES
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 Primary nephrologist Dr. Valdivia. Access w PD catheter.  Dry weight 74.5 kg.  First date of dialysis: 7/17/20. Last session prior to admit: evening of 6/1/25.  Outpatient PD Rx:   Total fill vol: 15.6 L  Cycler time: 12 h  Number of exchanges: 6  Fill vol: 2600 mL  No day dwell    Tonight's PD Rx: same as home      Anemia: hgb 10.8, ferritin 1174, Tsat 28%. On NGUYEN & IV Fe outpatient   Volume/HTN: BP appropriate, euvolemic   Acidosis: bicarb 22  MBD: Ca  8.5, phos 5.9     #CAD s/p CABG x3 (6/2/25)    Recommendations were communicated to primary team via note     Seen and discussed with Dr. Richi Alcantar MD   Division of Renal Disease and Hypertension  [facility]  [email or web address]  [employer]    SUBJECTIVE     Interval History :   Nursing and provider notes from last 24 hours reviewed.  In the last 24 hours Cesar Rashid   Tolerated BP last night. Improved swelling    mL  Plan to go home tomorrow     A comprehensive review of systems was negative except as noted above.    OBJECTIVE     Physical Exam:   I/O last 3 completed shifts:  In: 421 [P.O.:710]  Out: 126 [Other:126]   /65 (BP Location: Left arm, Cuff Size: Adult Regular)   Pulse 74   Temp 98  F (36.7  C) (Oral)   Resp 18   Ht 1.753 m (5' 9\")   Wt 76.6 kg (168 lb 14.4 oz)   SpO2 96%   BMI 24.94 kg/m       General: lying in bed   HEENT: mmm  Cardiac: rrr  Pulmonary:unlabored  Abdominal: nontender  Extremities: trace LE edema   Neuro: A&Ox4    Labs:   All labs reviewed by me  Electrolytes/Renal -   Recent Labs   Lab Test 06/04/25  0717 06/04/25  0550 06/03/25  2107 06/03/25  0617 06/03/25  0505 06/02/25  1820 " 06/02/25  1818   NA  --  135  --   --  135  --  135   POTASSIUM  --  4.3  --   --  4.9  --  4.3   CHLORIDE  --  96*  --   --  97*  --  96*   CO2  --  22  --   --  19*  --  19*   BUN  --  48.8*  --   --  42.8*  --  36.5*   CR  --  11.40*  --   --  10.40*  --  9.38*   * 103* 161*   < > 112*   < > 159*   ISIDRO  --  8.5*  --   --  8.9  --  9.1   MAG  --  2.9*  --   --  3.0*  --  3.3*   PHOS  --  5.9*  --   --  5.7*  --  4.8*    < > = values in this interval not displayed.       CBC -   Recent Labs   Lab Test 06/04/25  0550 06/03/25  0505 06/02/25  1818   WBC 8.5 7.3 9.1   HGB 10.8* 10.9* 10.9*    227 224       LFTs -   Recent Labs   Lab Test 06/03/25  0505 06/02/25  1334 05/28/25  1050   ALKPHOS 69 59 96   BILITOTAL 0.3 0.5 0.3   ALT 6 13 16   AST 28 29 24   PROTTOTAL 5.2* 4.6* 6.3*   ALBUMIN 3.0* 2.8* 3.1*       Iron Panel -   Recent Labs   Lab Test 05/31/25  1159 05/28/25  1050 05/25/25  1212 05/04/25  1055 04/29/25  1530   IRON 38* 47* 46*   < > 112   IRONSAT 28 28 30   < > 52*   MAR  --   --   --   --  1,174*    < > = values in this interval not displayed.     Current Medications:  Current Facility-Administered Medications   Medication Dose Route Frequency Provider Last Rate Last Admin    acetaminophen (TYLENOL) tablet 975 mg  975 mg Oral or Feeding Tube Q8H Zafar Allan MD   975 mg at 06/05/25 0803    aspirin (ASA) chewable tablet 162 mg  162 mg Oral Daily Attila Brewer PA   162 mg at 06/05/25 0803    carvedilol (COREG) tablet 6.25 mg  6.25 mg Oral BID Ridge Huerta MD        heparin ANTICOAGULANT injection 5,000 Units  5,000 Units Subcutaneous Q8H Zafar Allan MD   5,000 Units at 06/05/25 1219    [START ON 6/6/2025] levothyroxine (SYNTHROID/LEVOTHROID) tablet 200 mcg  200 mcg Oral Daily Ridge Huerta MD        pantoprazole (PROTONIX) EC tablet 40 mg  40 mg Oral Daily Zafar Allan MD   40 mg at 06/05/25 0803    polyethylene glycol (MIRALAX) Packet 17 g  17 g Oral or Feeding Tube Daily Sanjana  MD Zafar   17 g at 06/05/25 0803    rosuvastatin (CRESTOR) tablet 10 mg  10 mg Oral Daily Paul Masterson PA-C   10 mg at 06/05/25 0803    senna-docusate (SENOKOT-S/PERICOLACE) 8.6-50 MG per tablet 1 tablet  1 tablet Oral or Feeding Tube BID Zafar Allan MD   1 tablet at 06/05/25 0803    sodium chloride (PF) 0.9% PF flush 3 mL  3 mL Intracatheter Q8H LEEANNA Zafar Allan MD   3 mL at 06/05/25 0804     Current Facility-Administered Medications   Medication Dose Route Frequency Provider Last Rate Last Admin    dianeal PD LOW calcium-1.5% dex (calcium 2.5 mEq/L) PERITONEAL DIALYSATE   Dialysis PERITONEAL DIALYSIS Nasir Alcantar MD        dianeal PD LOW calcium-2.5% dex (calcium 2.5 mEq/L) PERITONEAL DIALYSATE   Dialysis PERITONEAL DIALYSIS Nasir Alcantar MD Samuel Weinberg, MD

## 2025-06-05 NOTE — PROGRESS NOTES
PRE PERITONEAL DIALYSIS TREATMENT NOTE      Date:  6/4/2025  Time:  9:44 PM    Data:   Total Treatment Volume mL:  50910   Total Duration of Therapy hours: 12   Fill Volume mL: 2000 mL  Total Number of Cycles: 6  Heater Bag: Volume:6000 mL, Dextrose 2.5%, Ca 2.5mEq with no additives  Side Bag # 1: [id number] mL, Dextrose 1.5%, Ca 2.5mEq with no additives  Final Fill:Volume 0 mL, No Final Fill with no additives:     PD Dressing Change: No, Gentamicin 0.1% Cream: No  Weight:76.6 kg (168 lb 14.4 oz)kg Bed Scale    Assessment/Interventions:   Patient stable, VSS, Patient assessed and patient knowledgeable and is able to safely connect and disconnect PD independently.  Will continue to assess and educate patient about PD cares and therapy as needed. PD treatment initiated w/o issues.      Plan:      Will follow per renal team. Continue to assess how patient tolerates PD therapy. PD RN available for questions at pager: [phone].

## 2025-06-05 NOTE — PROGRESS NOTES
Cardiovascular Surgery Progress Note  06/05/2025         Assessment and Plan:     Cesar Rashid is a 74 year old male with PMH of ESRD (on PD) and getting evaluated for kidney txp, HTN, inferior STEMI 2023, VF cardiac arrest 2023 s/p ICD placement, ANCA vasculitis. He is being worked up for kidney transplant and found progression of his severe 3 vessel CAD. CVTS evaluation found him a suitable candidate for surgery. He does have Hx STEMI with VF arrest in Sept, 2023. He is a [employer] and does not want to receive blood products. He was asymptomatic concerning his CAD.   Cesar is s/p scheduled CABG x 3 by Dr. Huerta on 6/2/2025     Cardiovascular:   HTN and HLD  Hx of severe 3 vessel CAD w STEMI and VF arrest (2023)  Hx shockwave lithotripsy and SURESH to RCA/RPLA (Sept, 2023)  Hx ICD placement p VF arrest 2023  S/p 3 vessel CABG  CAD: admitted to [facility] (9/2023) with inferior STEMI c/b VF arrest with SURESH to RCA and SURESH to RPLA. Had ICD placement at that time.   CABG: LIMA to LAD, vein to PDA, vein to OM1.  No arrhythmia, HD stable.  Intra-op YULIA 6/2/25; LV EF 55-60%, improved R WMA, RV function preserved, trace MR and TR.   mg  Rosuvastatin 10 mg daily.  Coreg 6.25 mg BID (PTA dose)    Chest tubes: 2 mediastinals and Left pleural removed 6/4  TPW: removed 6/4    Pulmonary:  Pulmonary Nodules  Extubated POD 0 to RA. Now saturating well on RA.   Supplemental O2 PRN to keep sats > 92%. Wean off as tolerated.  Pulm toilet, IS, activity and deep breathing    Neurology / MSK:  Median Sternotomy  Left LE vein harvest   Strict sternotomy precautions for 12 weeks  Neuro intact  Acute post-operative pain well controlled with acetaminophen, PO oxycodone PRN     / Renal:  ESRD on Nocturnal Peritoneal Dialysis, aneuric   Bladder Cancer  End stage renal disease, on peritoneal dialysis  Pre-op weight 167 lbs. Weight; + 1 lbs since admit and trending stable.   24 hr Fluid status; net gain 500 mL.  Aneuric  Diuresis: not indicated    GI / FEN:   - Regular diet, continue bowel regimen until first BM  - Replace electrolytes as needed, hepatic enzymes WNL.    Endocrine / Rheumatology:  Pre-op Hgb A1C 4.9.  - Stress induced hyperglycemia initially managed on insulin drip postop, transitioned to sliding scale.  - Hx ANCA associated vasculitis with associated renal compromise    Infectious Disease:  Stress induced leukocytosis  - WBC WNL, remains afebrile, no signs or symptoms of infection  - Completed perioperative antibiotics    Hematology:   B[place]ai, avoid blood products   Chronic Iron deficiency anemia  Acute blood loss anemia and thrombocytopenia  Hgb and Plt WNL, no signs or symptoms of active bleeding  Avoid blood draws, as able. Pediatric tubes when necessary.     Antiplatelet / Anticoagulation:    mg    Prophylaxis:   Stress ulcer prophylaxis: Pantoprazole 40 mg daily for 30 days  DVT prophylaxis: Subcutaneous heparin, SCD    Disposition:   Transferred to  on 6/3/2025  Therapies recommending discharge to Home w Assist, TBD  Medically Ready for Discharge: Anticipated Tomorrow    Patient seen and discussed with Dr Huerta during AM rounds.     Kianna Arthur CNP  Cardiothoracic Surgery  Available on "[employer]    Time spent on encounter: 31 minutes    Clinically Significant Risk Factors          # Hypochloremia: Lowest Cl = 96 mmol/L in last 2 days, will monitor as appropriate      # Hypoalbuminemia: Lowest albumin = 2.8 g/dL at 6/2/2025  1:34 PM, will monitor as appropriate       # Hypertension: Noted on problem list     # Acute Hypoxic Respiratory Failure: Based on blood gas results. Continue supplemental oxygen as needed             # Financial/Environmental Concerns: none   # ICD device present  # History of CABG: noted on surgical history               Interval History:     No overnight events.   States pain is well managed on current regimen. Slept well overnight  Tolerating diet, is  "passing flatus, no BM. No nausea or vomiting.  Breathing well without complaints.   Working with therapies and ambulating in halls without assistance.   Denies chest pain, palpitations, dizziness, syncopal symptoms, fevers, chills, myalgias, or sternal popping/clicking.         Physical Exam:   Blood pressure 132/80, pulse 68, temperature 98  F (36.7  C), temperature source Oral, resp. rate 18, height 1.753 m (5' 9\"), weight 76.6 kg (168 lb 14.4 oz), SpO2 95%.  Vitals:    06/02/25 0515 06/04/25 0500   Weight: 75.9 kg (167 lb 5.3 oz) 76.6 kg (168 lb 14.4 oz)      Gen: A&Ox4, NAD  Neuro: no focal deficits   CV: RRR, normal S1 S2, no murmurs, rubs or gallops.   Pulm: CTA, no wheezing or rhonchi, normal breathing on RA  Abd: nondistended, normal BS, soft, nontender  Ext: no peripheral edema  Incision: clean, dry, intact, no erythema, sternum stable  Tubes/drain sites: dressing clean and dry    Imaging:  reviewed recent imaging  Recent Results (from the past 24 hours)   XR Chest 2 Views    Narrative    Chest 2 views 6/4/2025 at 1221    INDICATION: Post CABG. Removed chest tubes and pacing wires    COMPARISON: 0909 same day    Upright PA and lateral views show normal size and shape of the heart.  Left subclavian transvenous approach implantable cardiac defibrillator  again noted. Removal of mediastinal drain tubing. Linear subsegmental  atelectasis in the left lower lung unchanged. Minimal blunting of the  left costophrenic angle may indicate trace pleural effusion or other  scarring. No pneumothorax.      Impression    IMPRESSION: Trace left pleural effusion unchanged. No ectopic air  posterior to been removal. Minimal unchanged left lower lung  subsegmental atelectasis.    TOBI RODARTE MD         SYSTEM ID:  [id number]       Labs:  BMP  Recent Labs   Lab 06/04/25  0717 06/04/25  0550 06/03/25  2107 06/03/25  1817 06/03/25  0617 06/03/25  0505 06/02/25  1820 06/02/25  1818 06/02/25  1619 06/02/25  1334   NA  --  135 "  --   --   --  135  --  135  --  135   POTASSIUM  --  4.3  --   --   --  4.9  --  4.3  --  4.3   CHLORIDE  --  96*  --   --   --  97*  --  96*  --  97*   ISIDRO  --  8.5*  --   --   --  8.9  --  9.1  --  9.1   CO2  --  22  --   --   --  19*  --  19*  --  23   BUN  --  48.8*  --   --   --  42.8*  --  36.5*  --  34.6*   CR  --  11.40*  --   --   --  10.40*  --  9.38*  --  9.24*   * 103* 161* 137*   < > 112*   < > 159*   < > 117*  114*    < > = values in this interval not displayed.     CBC  Recent Labs   Lab 06/04/25  0550 06/03/25  0505 06/02/25  1818 06/02/25  1334   WBC 8.5 7.3 9.1 7.8   RBC 3.51* 3.54* 3.50* 3.05*   HGB 10.8* 10.9* 10.9* 9.4*   HCT 33.8* 33.8* 32.7* 28.9*   MCV 96 96 93 95   MCH 30.8 30.8 31.1 30.8   MCHC 32.0 32.2 33.3 32.5   RDW 16.9* 16.9* 16.9* 16.5*    227 224 158     INR  Recent Labs   Lab 06/02/25  1334 06/02/25  1213   INR 1.56* 1.76*      Hepatic Panel  Recent Labs   Lab 06/03/25  0505 06/02/25  1334   AST 28 29   ALT 6 13   ALKPHOS 69 59   BILITOTAL 0.3 0.5   ALBUMIN 3.0* 2.8*     GLUCOSE:   Recent Labs   Lab 06/04/25  0717 06/04/25  0550 06/03/25  2107 06/03/25  1817 06/03/25  1256 06/03/25  0756   * 103* 161* 137* 112* 100*

## 2025-06-05 NOTE — PLAN OF CARE
Goal Outcome Evaluation:      Plan of Care Reviewed With: patient    Overall Patient Progress: improving    [id number]     Diagnosis: s/p CABG x3 on 6/2  Shift changes:   -Uneventful.      Neuro: A&O x4, able to make needs known.   Cardiac: NSR. Denied chest pain, dizziness, palpitations. VSS, afebrile.   Respiratory: RA, sating >95%. Denied SOB.   GI/: No BM yet, patient reported passing gas. Suppository given on top of scheduled bowel meds. PD patient, oliguric.   Endocrine: On PD  Diet/Appetite: Regular diet, good appetite.   Skin: CT sites covered with dry gauze C/D/I changed by CVTS today.   LDA: Right PIV x2 SL.   Activity: Ax1 w/walker and gait belt. Ambulated outside room multiple times.   Pain: Incisional pain reported. Pain in controlled with current regimen.   Plan: Continue with POC.  Notify CVTS of pertinent changes.

## 2025-06-06 ENCOUNTER — APPOINTMENT (OUTPATIENT)
Dept: OCCUPATIONAL THERAPY | Facility: CLINIC | Age: 75
DRG: 235 | End: 2025-06-06
Attending: SURGERY
Payer: COMMERCIAL

## 2025-06-06 VITALS
HEART RATE: 74 BPM | RESPIRATION RATE: 16 BRPM | DIASTOLIC BLOOD PRESSURE: 64 MMHG | BODY MASS INDEX: 25.55 KG/M2 | WEIGHT: 172.5 LBS | HEIGHT: 69 IN | TEMPERATURE: 97.5 F | OXYGEN SATURATION: 95 % | SYSTOLIC BLOOD PRESSURE: 99 MMHG

## 2025-06-06 LAB
ANION GAP SERPL CALCULATED.3IONS-SCNC: 16 MMOL/L (ref 7–15)
BUN SERPL-MCNC: 60.4 MG/DL (ref 8–23)
CALCIUM SERPL-MCNC: 7.8 MG/DL (ref 8.8–10.4)
CHLORIDE SERPL-SCNC: 94 MMOL/L (ref 98–107)
CREAT SERPL-MCNC: 11.7 MG/DL (ref 0.67–1.17)
EGFRCR SERPLBLD CKD-EPI 2021: 4 ML/MIN/1.73M2
GLUCOSE SERPL-MCNC: 135 MG/DL (ref 70–99)
HCO3 SERPL-SCNC: 23 MMOL/L (ref 22–29)
HGB BLD-MCNC: 10.3 G/DL (ref 13.3–17.7)
MCV RBC AUTO: 94 FL (ref 78–100)
POTASSIUM SERPL-SCNC: 4.1 MMOL/L (ref 3.4–5.3)
SODIUM SERPL-SCNC: 133 MMOL/L (ref 135–145)

## 2025-06-06 PROCEDURE — 85018 HEMOGLOBIN: CPT | Performed by: PHYSICIAN ASSISTANT

## 2025-06-06 PROCEDURE — 250N000013 HC RX MED GY IP 250 OP 250 PS 637: Performed by: SURGERY

## 2025-06-06 PROCEDURE — 999N000090 HC STATISTIC LEVEL 2 EST PATIENT

## 2025-06-06 PROCEDURE — 36415 COLL VENOUS BLD VENIPUNCTURE: CPT | Performed by: PHYSICIAN ASSISTANT

## 2025-06-06 PROCEDURE — 250N000011 HC RX IP 250 OP 636: Performed by: SURGERY

## 2025-06-06 PROCEDURE — 250N000013 HC RX MED GY IP 250 OP 250 PS 637: Performed by: PHYSICIAN ASSISTANT

## 2025-06-06 PROCEDURE — 97110 THERAPEUTIC EXERCISES: CPT | Mod: GO

## 2025-06-06 PROCEDURE — 97535 SELF CARE MNGMENT TRAINING: CPT | Mod: GO

## 2025-06-06 PROCEDURE — 97530 THERAPEUTIC ACTIVITIES: CPT | Mod: GO

## 2025-06-06 PROCEDURE — 84132 ASSAY OF SERUM POTASSIUM: CPT | Performed by: PHYSICIAN ASSISTANT

## 2025-06-06 RX ORDER — OXYCODONE HYDROCHLORIDE 5 MG/1
5 TABLET ORAL EVERY 4 HOURS PRN
Qty: 8 TABLET | Refills: 0 | Status: SHIPPED | OUTPATIENT
Start: 2025-06-06

## 2025-06-06 RX ORDER — PANTOPRAZOLE SODIUM 40 MG/1
40 TABLET, DELAYED RELEASE ORAL DAILY
Qty: 30 TABLET | Refills: 0 | Status: CANCELLED | OUTPATIENT
Start: 2025-06-07

## 2025-06-06 RX ORDER — CARVEDILOL 6.25 MG/1
3.12 TABLET ORAL 2 TIMES DAILY WITH MEALS
Qty: 30 TABLET | Refills: 0 | Status: SHIPPED | OUTPATIENT
Start: 2025-06-06

## 2025-06-06 RX ORDER — ACETAMINOPHEN 500 MG
500-1000 TABLET ORAL EVERY 6 HOURS PRN
Qty: 20 TABLET | Refills: 0 | Status: SHIPPED | OUTPATIENT
Start: 2025-06-06

## 2025-06-06 RX ORDER — POLYETHYLENE GLYCOL 3350 17 G/17G
17 POWDER, FOR SOLUTION ORAL DAILY
Qty: 510 G | Refills: 0 | Status: SHIPPED | OUTPATIENT
Start: 2025-06-06

## 2025-06-06 RX ORDER — ASPIRIN 81 MG/1
162 TABLET, CHEWABLE ORAL DAILY
Qty: 60 TABLET | Refills: 0 | Status: SHIPPED | OUTPATIENT
Start: 2025-06-07

## 2025-06-06 RX ADMIN — SENNOSIDES AND DOCUSATE SODIUM 1 TABLET: 50; 8.6 TABLET ORAL at 08:00

## 2025-06-06 RX ADMIN — ASPIRIN 81 MG CHEWABLE TABLET 162 MG: 81 TABLET CHEWABLE at 08:00

## 2025-06-06 RX ADMIN — ROSUVASTATIN CALCIUM 10 MG: 10 TABLET, FILM COATED ORAL at 08:00

## 2025-06-06 RX ADMIN — HEPARIN SODIUM 5000 UNITS: 5000 INJECTION, SOLUTION INTRAVENOUS; SUBCUTANEOUS at 05:38

## 2025-06-06 RX ADMIN — CARVEDILOL 6.25 MG: 6.25 TABLET, FILM COATED ORAL at 08:00

## 2025-06-06 RX ADMIN — PANTOPRAZOLE SODIUM 40 MG: 40 TABLET, DELAYED RELEASE ORAL at 08:00

## 2025-06-06 RX ADMIN — ACETAMINOPHEN 975 MG: 325 TABLET, FILM COATED ORAL at 08:00

## 2025-06-06 RX ADMIN — LEVOTHYROXINE SODIUM 200 MCG: 0.15 TABLET ORAL at 08:00

## 2025-06-06 RX ADMIN — POLYETHYLENE GLYCOL 3350 17 G: 17 POWDER, FOR SOLUTION ORAL at 08:01

## 2025-06-06 ASSESSMENT — ACTIVITIES OF DAILY LIVING (ADL)
ADLS_ACUITY_SCORE: 39
ADLS_ACUITY_SCORE: 41

## 2025-06-06 NOTE — PLAN OF CARE
"Discharged to: Home  Via: Automobile  Accompanied by: Family/wife  Discharge Instructions: diet, activity, medications, follow up appointments, when to call the MD, aftercare instructions, and what to watchout for (i.e. s/s of infection, increasing SOB, palpitations, chest pain,)  Prescriptions: To be filled by discharge pharmacy per pt's request; medication list reviewed & sent with pt  Follow Up Appointments: arranged; information given  Belongings: All sent with pt  IV: out  Telemetry: off  Pt exhibits understanding of above discharge instructions; all questions answered.    Discharge Paperwork: copied, and sent home with patient.     Problem: Delirium  Goal: Optimal Coping  Outcome: Progressing  Goal: Improved Behavioral Control  Outcome: Progressing  Goal: Improved Attention and Thought Clarity  Outcome: Progressing  Goal: Improved Sleep  Outcome: Progressing     Problem: Adult Inpatient Plan of Care  Goal: Plan of Care Review  Description: The Plan of Care Review/Shift note should be completed every shift.  The Outcome Evaluation is a brief statement about your assessment that the patient is improving, declining, or no change.  This information will be displayed automatically on your shift  note.  Outcome: Progressing  Flowsheets (Taken 6/6/2025 1022)  Plan of Care Reviewed With: patient  Overall Patient Progress: improving  Goal: Patient-Specific Goal (Individualized)  Description: You can add care plan individualizations to a care plan. Examples of Individualization might be:  \"Parent requests to be called daily at 9am for status\", \"I have a hard time hearing out of my right ear\", or \"Do not touch me to wake me up as it startles  me\".  Outcome: Progressing  Goal: Absence of Hospital-Acquired Illness or Injury  Outcome: Progressing  Intervention: Prevent Infection  Recent Flowsheet Documentation  Taken 6/6/2025 0800 by Yajaira Acuña RN  Infection Prevention: cohorting utilized  Goal: Optimal Comfort and " Wellbeing  Outcome: Progressing  Goal: Readiness for Transition of Care  Outcome: Progressing  Intervention: Mutually Develop Transition Plan  Recent Flowsheet Documentation  Taken 6/6/2025 0800 by Yajaira Acuña RN  Equipment Currently Used at Home: none     Problem: Cardiovascular Surgery  Goal: Improved Activity Tolerance  Outcome: Progressing  Goal: Optimal Coping with Heart Surgery  Outcome: Progressing  Goal: Absence of Bleeding  Outcome: Progressing  Goal: Effective Bowel Elimination  Outcome: Progressing  Goal: Effective Cardiac Function  Outcome: Progressing  Goal: Optimal Cerebral Tissue Perfusion  Outcome: Progressing  Goal: Fluid and Electrolyte Balance  Outcome: Progressing  Goal: Blood Glucose Level Within Targeted Range  Outcome: Progressing  Goal: Absence of Infection Signs and Symptoms  Outcome: Progressing  Intervention: Prevent or Manage Infection  Recent Flowsheet Documentation  Taken 6/6/2025 0800 by Yajaira Acuña RN  Infection Prevention: cohorting utilized  Goal: Anesthesia/Sedation Recovery  Outcome: Progressing  Goal: Acceptable Pain Control  Outcome: Progressing  Goal: Nausea and Vomiting Relief  Outcome: Progressing  Goal: Effective Urinary Elimination  Outcome: Progressing  Goal: Effective Oxygenation and Ventilation  Outcome: Progressing     Problem: Skin Injury Risk Increased  Goal: Skin Health and Integrity  Outcome: Progressing  Intervention: Plan: Nurse Driven Intervention: Moisture Management  Recent Flowsheet Documentation  Taken 6/6/2025 0800 by Yajaira Acuña RN  Moisture Interventions: Encourage regular toileting  Bathing/Skin Care:   dressed/undressed   electrode patches/site rotation  Intervention: Plan: Nurse Driven Intervention: Friction and Shear  Recent Flowsheet Documentation  Taken 6/6/2025 0800 by Yajaira Acuña RN  Friction/Shear Interventions:   HOB 30 degrees or less   Silicone foam sacral dressing     Problem: Risk for Delirium  Goal: Optimal  Coping  Outcome: Progressing  Goal: Improved Behavioral Control  Outcome: Progressing  Goal: Improved Attention and Thought Clarity  Outcome: Progressing  Goal: Improved Sleep  Outcome: Progressing     Problem: Cardiac Surgery Discharge Checklist  Goal: Medically stable  Description: No IV medication for pain  No IV medication for BP  No IV medication from blood surgery  No IV medication for diuresis  Outcome: Progressing  Flowsheets (Taken 6/6/2025 0954)  Medically stable, no IV medication for pain, bp, blood surgery, and/or diuresis: met  Goal: Euvolemic  Outcome: Progressing  Flowsheets (Taken 6/6/2025 0954)  Euvolemic: met  Goal: Voiding  Description: Voiding or has returned to intermittent dialysis  Outcome: Progressing  Flowsheets (Taken 6/6/2025 0954)  Voiding or has returned to intermittent dialysis: met  Goal: Adequate Nutritional Intake  Description: Tolerating adequate nutritional intake  Outcome: Progressing  Flowsheets (Taken 6/6/2025 0954)  Tolerating adequate nutritional intake: met  Goal: Room air or tested to qualify for home O2  Description: Home discharge, on room air or has had testing to qualify for home oxygen  Outcome: Progressing  Flowsheets (Taken 6/6/2025 0954)  Home discharge, on room air or has had testing to qualify for home oxygen: met  Goal: Device training  Outcome: Progressing  Flowsheets (Taken 6/6/2025 0954)  Device training complete, rehab services: not applicable  Goal: Adequate anti-coagulation  Outcome: Progressing  Flowsheets (Taken 6/6/2025 0954)  Adequate anti-coagulation: met  Goal: Drains and chest tubes removed  Outcome: Progressing  Flowsheets (Taken 6/6/2025 0954)  Drains and chest tubes removed: met  Goal: Post-op imaging and echo completed  Description: (Not applicable to all cases)  Outcome: Progressing  Flowsheets (Taken 6/6/2025 0954)  Post-Op Imaging: met  Goal: Wound Vac  Description: Transitioned to ambulatory wound vac (not applicable to all cases)  Outcome:  Progressing  Flowsheets (Taken 6/6/2025 2750)  Transitioned to ambulatory wound vac: not applicable     Goal Outcome Evaluation:      Plan of Care Reviewed With: patient    Overall Patient Progress: improving

## 2025-06-06 NOTE — PROGRESS NOTES
PRE PERITONEAL DIALYSIS TREATMENT NOTE      Date:  6/5/2025  Time:  8:15 PM    Data:   Total Treatment Volume mL: 93337    Total Duration of Therapy hours: 12   Fill Volume mL: 2600 mL  Total Number of Cycles: 6  Heater Bag: Volume:6000 mL, Dextrose 2.5%, Ca 2.5mEq with no additives  Side Bag # 1: [id number] mL, Dextrose 1.5%, Ca 2.5mEq with no additives  Side Bag #2: Volume 6000ml, Dextrose 2.5%, Ca (2.5mEq with no additives  Final Fill:Volume 0 mL, No Final Fill with no additives:     PD Dressing Change: Yes, Gentamicin 0.1% Cream: Yes  Weight:76.6 kg (168 lb 14.4 oz)kg Standing  Patient is constipated and being given bowel medications.     Assessment/Interventions:   Patient stable, VSS, Patient assessed and patient knowledgeable and is able to safely connect and disconnect PD independently.  Patient is very knowledgeable about PD therapy and managing it at home. He is also experienced with managing his chronic constipation and he would like to be discharged home tomorrow if possible. Will continue to assess and educate patient about PD cares and therapy as needed         Plan:      Will follow per renal team. Continue to assess how patient tolerates PD therapy. PD RN available for questions at pager: [phone].

## 2025-06-06 NOTE — PROGRESS NOTES
POST PERITONEAL DIALYSIS TREATMENT NOTE      Date:  6/6/2025  Time:  10:43 AM    Data: Overnight PD Therapy:   Total Treatment Volume: 88081 mL   Total Treatment Time: 12 Hours   Fill Volume: 2600 mL  Total Number of Cycles: 6  Final Fill Volume: No Final Fill   PD Dressing Change: No, Gentamicin 0.1% Cream: No  Weight: 78.2 kg (172 lb 8 oz) (172.5 lbs) Standing     Initial Drain: 56 mL  UF Fluid Removed: 925 mL  UF Fluid Added: 0 mL  Average Dwell Time: 84 minutes  Lost Dwell Time Minutes: 15 minutes  Effluent description: Clear, Yellow, Fibrin     Assessment/Interventions:   Patient tolerated PD therapy well, vitally stable. Performing physical therapy in the room when writer arrived. Knowledgeable about PD in general and is able to safely connect and disconnect PD independently. Fibrin noted in both effluent bags at the end of therapy, no verbalized concerns. Will continue to assess and educate patient about PD cares and therapy as needed.     Plan:     Will follow per renal team. Continue to assess how patient tolerates PD therapy. PD RN available for questions at pager: [phone].

## 2025-06-06 NOTE — DISCHARGE SUMMARY
Dialysis Discharge Summary Brief    [facility]  Division of Nephrology  Nephrology Discharge Dialysis Orders  Ph: [phone]  Fax: [phone]    Cesar Rashid  MRN: [id number]  YOB: 1950    Dialysis Unit: [facility]   Primary Nephrologist: Dr. Valdivia     Date of Admission: 6/2/2025  Date of Discharge:     Hospital dialysis course:  Underwent CABG on 6/2, did not do PD that evening  Resumed CCPD on evening of 6/3, using lower volumes: 12h, 6 exchanges, 2L fill volume   Same lower volume treatment on evening of 6/4  Evening of 6/5 transitioned back to home PD Rx    At no time during his hospitalization did he any any major or acute issues related to his PD, tolerated it very well    Discharge Diagnosis:    ICD-10-CM    1. Coronary artery disease involving native coronary artery of native heart without angina pectoris  I25.10 chlorhexidine (PERIDEX) 0.12 % solution 10 mL     metoprolol tartrate (LOPRESSOR) half-tab 12.5 mg     aspirin (ASA) chewable tablet 162 mg     aspirin (ASA) chewable tablet 81 mg     aminocaproic acid 5 g in 0.9% NaCl 100 mL bolus SOLN 5 g     ceFAZolin Sodium (ANCEF) injection 2 g     acetaminophen (TYLENOL) tablet 975 mg     gabapentin (NEURONTIN) capsule 100 mg     famotidine (PEPCID) tablet 20 mg     Cardiac Rehab  Referral     DISCONTINUED: lidocaine 1 % 0.1-1 mL     DISCONTINUED: lidocaine (LMX4) cream     DISCONTINUED: sodium chloride (PF) 0.9% PF flush 3 mL     DISCONTINUED: sodium chloride (PF) 0.9% PF flush 3 mL     DISCONTINUED: HOLD:  All AM Medications     DISCONTINUED: aminocaproic acid (AMICAR) 5 g in sodium chloride 0.9 % 100 mL infusion     DISCONTINUED: EPINEPHrine (ADRENALIN) 5 mg in  mL infusion     DISCONTINUED: norepinephrine (LEVOPHED) 16 mg in  mL infusion MAX CONC CENTRAL LINE     DISCONTINUED: phenylephrine (RANDI-SYNEPHRINE) 50 mg in NaCl 0.9 % 250 mL infusion     DISCONTINUED: niCARdipine 40  mg in 200 mL NS (CARDENE) infusion     DISCONTINUED: dexmedeTOMIDine (PRECEDEX) 4 mcg/mL in sodium chloride 0.9 % 100 mL infusion     DISCONTINUED: insulin regular (MYXREDLIN) 1 unit/mL infusion     DISCONTINUED: potassium chloride 60 mEq, mannitol 25 % 7.5 g, magnesium sulfate 1.2 g, sodium bicarbonate 15 mEq in sodium chloride 0.9 % CARDIOPLEGIA solution     DISCONTINUED: lidocaine 200 mg, magnesium sulfate 1 g, mannitol 12.5 g, sodium bicarbonate 20 mEq CARDIOPLEGIA (Hot Shot) solution     DISCONTINUED: potassium chloride 11.6 mEq, mannitol 25 % 7.2 g, magnesium sulfate 1.2 g, sodium bicarbonate 14.5 mEq in sodium chloride 0.9 % CARDIOPLEGIA solution     DISCONTINUED: heparin (porcine) 10,000 Units, magnesium sulfate 1 g, mannitol 12.5 g, sodium bicarbonate 50 mEq in sodium chloride 0.9 % 239.5 mL PUMP PRIME solution     DISCONTINUED: heparin (porcine) 30,000 Units in sodium chloride 0.9% 1000 mL PERFUSION solution (cell saver)     DISCONTINUED: ceFAZolin Sodium (ANCEF) injection 2 g          [] New initiation, new dialysis orders will be faxed.    [x] Resume all previous dialysis orders with exception as noted below    New Orders (if not applicable put NA):  Estimated Dry Weight    Dialysis Duration    Dialysis Access    Antibiotics (dose per dialysis, end date)            Labs to be drawn at dialysis    Other major changes to dialysis prescription (e.g. Dialysate bath, heparin, blood flow rate, etc)      Medication changes (also fax the unit a copy of the discharge summary)              Name of physician completing this form: Nasir Alcantar MD, MD

## 2025-06-06 NOTE — DISCHARGE INSTRUCTIONS
AFTER YOU GO HOME FROM YOUR HEART SURGERY  (CABG x3- 6/2/2025)    You had a sternotomy, avoid lifting anything greater than ten pounds for 8 weeks after surgery and then less than 20 pounds for an additional 4 weeks.   Please try to sleep on your back for the first 4-6 weeks to avoid extra stress on your sternum (breastbone) while it is healing.   Do not reach backwards or use arms to push out of chair.   Do not let people pull on your arms to assist with standing.   Avoid twisting or reaching too far across your body.    Avoid strenuous activities such as bowling, vacuuming, raking, shoveling, golf or tennis for 12 weeks after your surgery.   It is okay to resume sex if you feel comfortable in doing so. You may have to try different positions with your partner.    Splint your chest incision by hugging a pillow or bringing your arms across your chest when coughing or sneezing.     No driving for 4 weeks after surgery or while on pain medication.    Shower or wash your incisions daily with soap and water (or as instructed), pat dry.   Keep wound clean and dry, showers are okay after discharge, but don't let spray hit directly on incision.   No baths or swimming for 1 month.   Cover chest tube sites with dry gauze until they stop draining, then leave open to air. It is not abnormal for chest tube sites to drain yellowish/clear fluid for up to 2-3 weeks after surgery.   Watch for signs of infection: increased redness, tenderness, warmth or any drainage that appears infected (pus like) or is persistent.  Also a temperature > 100.5 F or chills. Call your surgeon or primary care provider's office immediately.   Remove any skin glue left on incisions after 10-14 days. This will not affect your incision and can speed up healing.    Exercise is very important in your recovery. Please follow the guidelines set up for you in your cardiac rehab classes at the hospital. If outpatient cardiac rehab was ordered for you, we  highly recommend you participate. If you have problems arranging your cardiac rehab, please call [phone] for all locations, with the exception of [place], please call [phone] and [facility] [place], please call [phone].    Avoid sitting for prolonged periods of time, try to walk every hour during the day. If you have a leg incision, elevate your leg often when you are not walking.    Check your weight when you get home from the hospital and continue to check it daily through your recovery for at least a month. If you notice a weight gain of 2-3 pounds in a week, notify your primary care physician, cardiologist or surgeon.    Bowel activity may be slow after surgery. If necessary, you may take an over the counter laxative such as milk of magnesia or Miralax. You may have stool softeners prescribed (docusate sodium, Senokot). We recommend using stool softeners while using narcotics for pain (oxycodone/percocet, hydrocodone/vicodin, hydromorphone/dilaudid).      Wean OFF of narcotics (oxycodone, dilaudid, hydrocodone) as soon as possible. You should continue taking acetaminophen as long as you have any surgical pain as the first choice for pain control and add narcotics as necessary for pain to be tolerable.      DO NOT SMOKE.  IF YOU NEED HELP QUITTING, PLEASE TALK WITH YOUR CARDIOLOGIST OR PRIMARY DOCTOR.    REGARDING PRESCRIPTION REFILLS.  If you need a refill on your pain medication contact us to discuss your pain and a possible one time refill.   All other medications will be adjusted, discontinued and re-filled by your primary care physician and/or your cardiologist as they were prior to your surgery. We have given you enough for one to three month with possibly one refill.    POST-OPERATIVE CLINIC VISITS  You will have a follow up visit in [facility] Provider Clinic on at the [facility] on [facility].  You will then return to the care of your primary provider and your  cardiologist. Future medication refills should come from your PCP or Cardiologist.   You should see your primary care provider about 1-2 weeks after discharge.   It is important to see your cardiologist about 4 weeks after discharge.    If you do not hear from a  in 7 days, please call [phone] (choose option 1) and request to be seen with a general cardiologist or someone that you have seen in the past.   If there is a need to return to see CT Surgery please call our  at [phone].    SURGICAL QUESTIONS  Please call on of the RN Coordinators listed below with surgical recovery and medication questions.  They will assist you with your needs and contact other surgery care team members as indicated.    On weekends or after hours, please call [phone] and ask the  to page the Cardiothoracic Surgery fellow on call.      Thank you,    Your Cardiothoracic Surgery Team   Jess Mccray, RN Care Coordinator - [phone]  Kiersten Fajardo RN Care Coordinator - [phone]  Zoila Irwin, RN Care Coordinator - [phone]  Lisa Ronquillo, RN Care Coordinator - [phone]  Zoila Aggarwal, RN Care Coordinator - [phone]

## 2025-06-06 NOTE — DISCHARGE SUMMARY
[facility], [place]   Cardiothoracic Surgery Hospital Discharge Summary     Cesar Rashid MRN# [id number]   Age: 74 year old YOB: 1950     Admitting Physician:  Ridge Huerta MD  Discharge Physician:   MARY ELLEN Bermudez CNP  Primary Care Physician: Steffi Hatch      DATE OF ADMISSION: 6/2/2025      DATE OF DISCHARGE: 6/6/25    Admit Wt: 167 lbs 5.3 oz  Discharge Wt: 172 lbs 8 oz         Primary Diagnoses:   CAD s/p CABG  Hx  VF arrest, s/p ICD  ESRD, on PD          Secondary Diagnoses:   Acute post-operative pain  Stress-induced hyperglycemia  Stress-induced leukocytosis  Acute blood loss anemia  HTN  HLD  Pulmonary nodules  Chronic iron deficiency anemia    PROCEDURES PERFORMED:   Date: 6/3/2025   Surgeon: Dr. Huerta    Procedure/Surgery Information   Procedure: Procedure(s):  Median Sternotomy, CORONARY ARTERY BYPASS GRAFT x 3, Left Internal Mammary [place], Left Endoscopic Saphenous Vein [place], on Cardiopulmonary Bypass, Transesophageal Echocardiogram by Anesthesia   Surgeon(s): Surgeons and Role:     * Ridge Huerta MD - Primary     * Zafar Allan MD - Fellow - Assisting     * Dana Davis PA-C   Specimens: * No specimens in log *         INTRAOPERATIVE FINDINGS:    The patient's sternum had good quality  Veins obtained were adequate for bypass grafting.  LIMA was adequate quality with good flow.  Vessels bypassed included the  left anterior descending artery 2 mm vessel with proximal stenosis, and the PDA and OM which was 2 mm diameter. A lower extremity ultrasound was done was to confirm adequate quality and location of vein conduit.       CONSULTS:    PT/OT  Intensivist  Nephrology  Care Management    BRIEF HISTORY OF ILLNESS:  Cesar Rashid is a 74 y.o. male who was referred to CV Surgery for follow-up of CAD and cardiovascular evaluation in anticipation renal transplant.  He has history of end-stage renal disease on peritoneal  dialysis, anemia of chronic disease, hypertension, hypothyroidism, CAD with inferior STEMI and VF cardiac arrest 9/6/23 s/p shockwave lithotripsy and SURESH to RCA and SURESH x1 to RPLA, ischemic cardiomyopathy with LVEF of 45%, on amiodarone for VT.  Follow-up echo 11/18/2024 showed preserved LV systolic function, EF 60%. Amiodarone was reduced to 100 mg daily per EP recommendations at last visit given no recurrent VT on device checks.     When he was last seen in May 2024, he had taken himself off of amiodarone, isosorbide mononitrate, hydralazine and clopidogrel. He has done well since then.  He is currently being considered for kidney transplant at the [facility]. 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 He developed significant cramping in his hands to the point where he was unable to tie and neck tie while on atorvastatin. This all resolved when he stopped the medications.     He underwent coronary angiogram that showed showed severe CAD.    HOSPITAL COURSE: Cesar Rashid is a 74 year old male who on 6/2/2025 underwent the above-named procedures and tolerated the operation well.     Postoperatively was admitted to the CVICU.  Patient was extubated on POD 0.  Blood pressure and cardiac index were managed with vasopressors and inotropic agents which were continuously weaned until no longer needed.       Patient was transiently hyperglycemic and treated with insulin infusion then transitioned to sliding scale insulin per protocol. Blood sugars remained stable such that exogenous insulin was no longer required and no further glycemic control agents will be required at discharge.    Patient was subsequently transferred to the surgical telemetry floor on POD 1    While on the surgical unit, the patient continued to progress well. Chest tubes and temporary pacemaker wires were removed when deemed appropriate.     Given his ESRD, Nephrology was consulted. He  "resumed PD on POD 1. He will discharge 5 lb above his admission weight. Will continue to manage with his home PD regimen at the direction of nephrology.     Prior to discharge, his pain was controlled well, he was working well with therapies, able to perform most ADLs, ambulate without difficulty, and had full return of bowel and bladder function.  On 6/6/25 he was discharged home in stable condition.     Sternal precautions will continue for 12 weeks post-operatively with a upper extremity weight limit of 10 lb (pushing/pulling/lifting) for the first 8 weeks followed by an additional 4 weeks with a limit of 20 lb.  Follow up with Cardiology and Cardiac Surgery have been arranged. Pt encouraged to follow up with PCP and Cardiac Rehab upon discharge.    Patient discharged on aspirin:   yes 162 mg  Patient discharged on beta blocker:  yes - carvedilol (at reduced dose due to symptomatic orthostatic HoTN (90s).   Patient discharged on ACEI/ARB:  no      Patient discharged on statin:   yes - rosuvastatin         Discharge Disposition:     Discharged to home            Condition on Discharge:     Discharge condition: Stable   Discharge vitals: /79 (BP Location: Left arm, Cuff Size: Adult Regular)   Pulse 64   Temp 97.5  F (36.4  C) (Oral)   Resp 16   Ht 1.753 m (5' 9\")   Wt 78.2 kg (172 lb 8 oz)   SpO2 95%   BMI 25.47 kg/m     Code status on discharge: Full Code     Vitals:    06/02/25 0515 06/04/25 0500 06/06/25 1000   Weight: 75.9 kg (167 lb 5.3 oz) 76.6 kg (168 lb 14.4 oz) 78.2 kg (172 lb 8 oz)         DAY OF DISCHARGE PHYSICAL EXAM:    Gen: NAD, calm, cooperative on exam  Neuro: A&Ox4, no focal deficits   CV: RRR, S1 S2, no murmurs, rubs or gallops  Pulm: CTA, no wheezing or rhonchi, unlabored breathing on RA  Abd: SNTND, active BS  Ext: trace peripheral edema, incision C/D/I  Incision: clean, dry, intact, no erythema, sternum stable  Tubes/drain sites: scabbed over, no significant drainage or " surrounding erythema    CBC RESULTS:   Recent Labs   Lab Test 06/06/25  0530 06/04/25  0550 06/03/25  0505 06/02/25  1818   WBC  --  8.5 7.3 9.1   HGB 10.3* 10.8* 10.9* 10.9*   HCT  --  33.8* 33.8* 32.7*   PLT  --  224 227 224     CMP RESULTS:  Recent Labs   Lab Test 06/06/25  0530 06/04/25  0717 06/04/25  0550 06/03/25  0617 06/03/25  0505 06/02/25  1619 06/02/25  1334 06/02/25  0629 05/28/25  1050   *  --  135  --  135   < > 135   < > 134*   POTASSIUM 4.1  --  4.3  --  4.9   < > 4.3   < > 3.3*   CHLORIDE 94*  --  96*  --  97*   < > 97*  --  94*   CO2 23  --  22  --  19*   < > 23  --  24   ANIONGAP 16*  --  17*  --  19*   < > 15  --  16*   * 103* 103*   < > 112*   < > 117*  114*   < > 97   BUN 60.4*  --  48.8*  --  42.8*   < > 34.6*  --  39.4*   CR 11.70*  --  11.40*  --  10.40*   < > 9.24*  --  10.80*   GFRESTIMATED 4*  --  4*  --  5*   < > 6*  --  5*   ISIDRO 7.8*  --  8.5*  --  8.9   < > 9.1  --  9.0   BILITOTAL  --   --   --   --  0.3  --  0.5  --  0.3   ALKPHOS  --   --   --   --  69  --  59  --  96   ALT  --   --   --   --  6  --  13  --  16   AST  --   --   --   --  28  --  29  --  24    < > = values in this interval not displayed.     Recent Labs   Lab Test 06/02/25  1334 06/02/25  1213 05/28/25  1050   INR 1.56* 1.76* 1.03     Recent Labs   Lab 06/06/25  0530 06/04/25  0717 06/04/25  0550 06/03/25  2107 06/03/25  1817 06/03/25  1256   * 103* 103* 161* 137* 112*       CXR:  No results found for this or any previous visit (from the past 24 hours).        PRE-ADMISSION MEDICATIONS:  Medications Prior to Admission   Medication Sig Dispense Refill Last Dose/Taking    Coenzyme Q10 (COQ10 PO) Take by mouth every morning.   5/26/2025 Morning    levothyroxine (SYNTHROID/LEVOTHROID) 200 MCG tablet Take 1 tablet by mouth every morning (before breakfast).   6/2/2025 at  4:00 AM    methoxy PEG-epoetin beta (MIRCERA) 200 MCG/0.3ML SOSY injectable syringe Inject 0.6 mcg/kg subcutaneously once. Given  once ~1 month ago and once ~1.5 weeks ago following low Hgb labs   Past Month Morning    multivitamin w/minerals (MULTI-VITAMIN) tablet Take 1 tablet by mouth daily.   Past Week Morning    Naltrexone HCl, Pain, 4.5 MG CAPS Take 4.5 mg by mouth at bedtime.   5/26/2025 Bedtime    nitroGLYcerin (NITROSTAT) 0.4 MG sublingual tablet Place 0.4 mg under the tongue every 5 minutes as needed for chest pain.   More than a month    Omega-3 Fatty Acids (OMEGA-3 FISH OIL PO) Take 600 mg by mouth daily.   Past Week    rosuvastatin (CRESTOR) 10 MG tablet Take 10 mg by mouth at bedtime.   6/1/2025    sevelamer HCl (RENAGEL) 800 MG tablet Take 800 mg by mouth 3 times daily (with meals)   5/30/2025    UNABLE TO FIND Take 600 mg by mouth 3 times daily (with meals). Take 2 tablets by mouth three times daily with meals  MEDICATION NAME: Calcium aminoate 300 mg tablets   6/1/2025 Evening    vitamin E 400 units TABS Take 400 Units by mouth every morning.   5/26/2025    [DISCONTINUED] aspirin (ASA) 81 MG chewable tablet Take 81 mg by mouth at bedtime.   6/1/2025    [DISCONTINUED] carvedilol (COREG) 6.25 MG tablet Take 6.25 mg by mouth 2 times daily (with meals)   6/1/2025 Bedtime    [DISCONTINUED] lisinopril (ZESTRIL) 10 MG tablet Take 10 mg by mouth at bedtime.   5/30/2025 Bedtime    [DISCONTINUED] UNABLE TO FIND Take 2 capsules by mouth 2 times daily. MEDICATION NAME: arterosil   Past Week    [DISCONTINUED] UNABLE TO FIND Take 3 tablets by mouth 2 times daily. MEDICATION NAME: [employer] supplement   5/26/2025         DISCHARGE MEDICATIONS:      Review of your medicines        START taking        Dose / Directions   acetaminophen 500 MG tablet  Commonly known as: TYLENOL  Used for: S/P CABG (coronary artery bypass graft)      Dose: 500-1,000 mg  Take 1-2 tablets (500-1,000 mg) by mouth every 6 hours as needed for mild pain.  Quantity: 20 tablet  Refills: 0     oxyCODONE 5 MG tablet  Commonly known as: ROXICODONE  Used for: S/P CABG  (coronary artery bypass graft)      Dose: 5 mg  Take 1 tablet (5 mg) by mouth every 4 hours as needed for moderate pain.  Quantity: 8 tablet  Refills: 0     polyethylene glycol 17 GM/Dose powder  Commonly known as: MIRALAX  Used for: S/P CABG (coronary artery bypass graft)      Dose: 17 g  Take 17 g by mouth daily.  Quantity: 510 g  Refills: 0            CONTINUE these medicines which may have CHANGED, or have new prescriptions. If we are uncertain of the size of tablets/capsules you have at home, strength may be listed as something that might have changed.        Dose / Directions   aspirin 81 MG chewable tablet  Commonly known as: ASA  This may have changed:   how much to take  when to take this  Used for: Coronary artery disease involving autologous artery coronary bypass graft without angina pectoris      Dose: 162 mg  Start taking on: June 7, 2025  Take 2 tablets (162 mg) by mouth daily.  Quantity: 60 tablet  Refills: 0     carvedilol 6.25 MG tablet  Commonly known as: COREG  This may have changed: how much to take  Used for: Hypertension secondary to other renal disorders      Dose: 3.125 mg  Take 0.5 tablets (3.125 mg) by mouth 2 times daily (with meals).  Quantity: 30 tablet  Refills: 0            CONTINUE these medicines which have NOT CHANGED        Dose / Directions   COQ10 PO      Take by mouth every morning.  Refills: 0     levothyroxine 200 MCG tablet  Commonly known as: SYNTHROID/LEVOTHROID      Dose: 1 tablet  Take 1 tablet by mouth every morning (before breakfast).  Refills: 0     Mircera 200 MCG/0.3ML Sosy injectable syringe  Generic drug: methoxy PEG-epoetin beta      Dose: 0.6 mcg/kg  Inject 0.6 mcg/kg subcutaneously once. Given once ~1 month ago and once ~1.5 weeks ago following low Hgb labs  Refills: 0     Multi-vitamin per tablet  Generic drug: multivitamin w/minerals      Dose: 1 tablet  Take 1 tablet by mouth daily.  Refills: 0     Naltrexone HCl (Pain) 4.5 MG Caps      Dose: 4.5 mg  Take  "4.5 mg by mouth at bedtime.  Refills: 0     nitroGLYcerin 0.4 MG sublingual tablet  Commonly known as: NITROSTAT      Dose: 0.4 mg  Place 0.4 mg under the tongue every 5 minutes as needed for chest pain.  Refills: 0     OMEGA-3 FISH OIL PO      Dose: 600 mg  Take 600 mg by mouth daily.  Refills: 0     rosuvastatin 10 MG tablet  Commonly known as: CRESTOR      Dose: 10 mg  Take 10 mg by mouth at bedtime.  Refills: 0     sevelamer HCl 800 MG tablet  Commonly known as: RENAGEL      Dose: 800 mg  Take 800 mg by mouth 3 times daily (with meals)  Refills: 0     UNABLE TO FIND      Dose: 600 mg  Take 600 mg by mouth 3 times daily (with meals). Take 2 tablets by mouth three times daily with meals  MEDICATION NAME: Calcium aminoate 300 mg tablets  Refills: 0     vitamin E 400 units Tabs      Dose: 400 Units  Take 400 Units by mouth every morning.  Refills: 0            STOP taking      lisinopril 10 MG tablet  Commonly known as: ZESTRIL        UNABLE TO FIND        UNABLE TO FIND                  Where to get your medicines        These medications were sent to [facility] [place], [place] - [address] [place] [id number]      Phone: [phone]   acetaminophen 500 MG tablet  aspirin 81 MG chewable tablet  carvedilol 6.25 MG tablet  oxyCODONE 5 MG tablet  polyethylene glycol 17 GM/Dose powder           Steffi Murray, MARY ELLEN [facility]   Cardiothoracic Surgery      Clinically Significant Risk Factors     # Overweight: Estimated body mass index is 25.47 kg/m  as calculated from the following:    Height as of this encounter: 1.753 m (5' 9\").    Weight as of this encounter: 78.2 kg (172 lb 8 oz).         "

## 2025-06-06 NOTE — PROGRESS NOTES
"CLINICAL NUTRITION SERVICES    Reason for Assessment:  Heart-healthy nutrition education, pt s/p CABG    Diet History:  Pt assessed by outpt RD 7/29/2024 for kidney transplant evaluation.     Pt reports no history of receiving heart-healthy nutrition education in the past. Pt is on PD and states he watches his sodium intake. Agreeable to nutrition education.     Nutrition Diagnosis:  Food- and nutrition-related knowledge deficit r/t knowledge deficit of heart-healthy diet AEB pt report of no previous formal heart-healthy nutrition education.    Nutrition Prescription/Recs:  Continue heart-healthy diet.      Interventions:  Nutrition Education: Provided brief verbal instruction on a heart-healthy diet as multiple providers came to see pt. Rec pt continue to follow any restrictions needed with dialysis. Reinforced sodium in foods. Provided handouts: Making Sense of Food Labels, Your Heart Healthy Diet, and Tips for a Low Sodium Diet.        Goals:    Pt will list at least two interventions to make current meal plan more heart-healthy.     Follow-up:   Patient to ask any further nutrition-related questions before discharge. In addition, pt may request outpatient RD appointment.     Lo Berman, MS, RD, LD, CNSC      No longer available via pager  6C (beds [id number] and [phone]): Vocera \"6C Clinical Dietitian\"   Weekend/Holiday: Vocera \"Weekend Clinical Dietitian\"   "

## 2025-06-06 NOTE — PLAN OF CARE
Neuro: A&O x 4. Afebrile. Pleasant affect. Calls appropriately. Appeared to sleep well overnight.   Cardiac: SR 60s-70s. VSS.   Respiratory: Sating well on RA.  GI/: Oliguric on peritoneal dialysis. LBM 6/1/25.  Diet/Appetite: Regular diet.  Skin: Sternal incision WDL, old CT sites CDI, ecchymotic L leg graft sites x 2.  LDA: R PIV x 2, SL.   Activity: SBA w/ gb.  Pain: Denies.

## 2025-06-06 NOTE — PROGRESS NOTES
Care Coordinator  D/I: Pt discharged and I have fax'd to:: [facility] ([facility])  Phone: [phone]  Fax: [phone]  Called and updated nurseZoila of anticipated discharge in 1-2 days. Zoila requested an update once date confirmed and to have discharge summary fax.  AVS/summary 2 PD runs sheets and neph discharge summary     [facility] (Outpatient Cardiac Rehab)  Phone: [phone]  Fax: [phone]  Faxed cardiac rehab referral and placed contact info on AVS.I have fax'd AVS and summary.

## 2025-06-09 ENCOUNTER — TELEPHONE (OUTPATIENT)
Dept: CARDIOLOGY | Facility: CLINIC | Age: 75
End: 2025-06-09
Payer: COMMERCIAL

## 2025-06-09 DIAGNOSIS — C67.2 MALIGNANT NEOPLASM OF LATERAL WALL OF URINARY BLADDER (H): Primary | ICD-10-CM

## 2025-06-09 NOTE — TELEPHONE ENCOUNTER
CARDIOTHORACIC SURGERY  Discharge Follow Up Phone Call    POST OP MONITORING  How is your pain on a 0-10 scale, how are you managing your pain? Going well, hasn't taken anything for pain    ACTIVITY  How is your activity tolerance? Going well  Are you still doing sternal precautions? Yes  Do you hear any clicking when you are moving or taking a deep breath? No  Are you using your incentive spirometer? Does not have one, encouraged to take long slow breaths  Are you weighing yourself daily? Weight has gone down to 166 lbs    SIGNS AND SYMPTOMS OF INFECTION  INCREASE IN PAIN - No  FEVER - No  DRAINAGE - No   REDNESS - No  SWELLING - No    ASSISTANCE  Do you have someone at home to assist you with your daily activities? Yes, wife    MEDICATIONS  Is someone helping you to set up your medications? Yes  Do you have any questions about your medications? No    FOLLOW UP  You are scheduled to see your primary care physician on Patient will reach out to schedule.  You are scheduled to see our surgery advanced practive provider for post operative follow up on 6/20.    You are scheduled for cardiac rehab on Patient will get scheduled [place].  You are scheduled to see your cardiologist on Patient would like to follow up in [place]. RNCC sent referral to Dr. Montes De Oca (patients previous cardiologist).    CONTACT INFORMATION  Please feel free to call us with any questions or symptoms that are concerning for you at [phone]. If it is after 4:30 in the afternoon, or a weekend please call [phone] and ask for the on call specialist. We want to do everything we can to help prevent you needing to return to the ED, so please do not hesitate to call us.    Jess Mccray, RNCC  Cardiothoracic Surgery  [phone]

## 2025-06-19 NOTE — PROGRESS NOTES
CARDIOTHORACIC SURGERY FOLLOW-UP VISIT     Cesar Rashid   1950   [id number]      Reason for visit:  Post-op follow up    ASSESSMENT/PLAN:  Cesar Rashid is a 74 year old male status post CABG x3 on 6/3/2025 with Dr. Huerta.      Surgically doing well at home. Pain is overall controlled. Incision healing well without signs of infection. Increasing activity and strength.  Appears euvolemic.    Hemodynamics are stable. Medications reviewed and no changes were needed today.  Follow up with your PCP and cardiologist as scheduled.  Complete outpatient Cardiac Rehab.  Continue sternal precautions for 12 weeks from surgery date.   No driving for 4 weeks from surgery date.     Postoperative restrictions have been reviewed and all of the patient's questions were answered. Our contact information was given to the patient if he should have any further questions/concerns. No further follow up is needed with us.  The total time spent with the patient was 25 minutes, > 50% of which was spent in counseling and coordination of care.    Vi Grant PA-C  Cardiothoracic Surgery  Pager: [phone]   _____________________________________________________________________________________________________________    HPI: Cesar Rashid is a 74 y.o. male who was referred to CV Surgery for follow-up of CAD and cardiovascular evaluation in anticipation renal transplant.  He has history of end-stage renal disease on peritoneal dialysis, anemia of chronic disease, hypertension, hypothyroidism, CAD with inferior STEMI and VF cardiac arrest 9/6/23 s/p shockwave lithotripsy and SURESH to RCA and SURESH x1 to RPLA, ischemic cardiomyopathy with LVEF of 45%.  Hospital course was generally unremarkable; he was followed by Nephrology for his ESRD and he resumed on PD on POD 1.  Patient was discharged home on 6/6/24 with weight 5 lb above his admission weight.  Presents to clinic for a routine follow-up appointment after  surgery.    Since discharge, has been recovering well ***.    Discharge weight 172 lbs; weight today 171 lbs  States pain is well controlled. He continues to take very occasionally for pain management.   Denies *** sternal popping or clicking or ***incisional drainage/erythema.  Sleep is ***.   ***No psychiatric concerns.  Breathing has been ***stable/improving. Continues to work on C&DB, able to reach *** on IS. Denies SOB at rest, PND, orthopnea. Non-productive ***cough.   Participating in therapy at ***.   *** Has had a *** appetite. Denies N/V/D/C. ***    ROS:  Review of symptoms otherwise negative unless commented on in HPI.     CBC RESULTS:   Recent Labs   Lab Test 06/06/25  0530 06/04/25  0550 06/03/25  0505 06/02/25  1818   WBC  --  8.5 7.3 9.1   HGB 10.3* 10.8* 10.9* 10.9*   HCT  --  33.8* 33.8* 32.7*   PLT  --  224 227 224     CMP RESULTS:  Recent Labs   Lab Test 06/06/25  0530 06/04/25  0717 06/04/25  0550 06/03/25  0617 06/03/25  0505 06/02/25  1619 06/02/25  1334 06/02/25  0629 05/28/25  1050   *  --  135  --  135   < > 135   < > 134*   POTASSIUM 4.1  --  4.3  --  4.9   < > 4.3   < > 3.3*   CHLORIDE 94*  --  96*  --  97*   < > 97*  --  94*   CO2 23  --  22  --  19*   < > 23  --  24   ANIONGAP 16*  --  17*  --  19*   < > 15  --  16*   * 103* 103*   < > 112*   < > 117*  114*   < > 97   BUN 60.4*  --  48.8*  --  42.8*   < > 34.6*  --  39.4*   CR 11.70*  --  11.40*  --  10.40*   < > 9.24*  --  10.80*   GFRESTIMATED 4*  --  4*  --  5*   < > 6*  --  5*   ISIDRO 7.8*  --  8.5*  --  8.9   < > 9.1  --  9.0   BILITOTAL  --   --   --   --  0.3  --  0.5  --  0.3   ALKPHOS  --   --   --   --  69  --  59  --  96   ALT  --   --   --   --  6  --  13  --  16   AST  --   --   --   --  28  --  29  --  24    < > = values in this interval not displayed.     Recent Labs   Lab Test 06/02/25  1334 06/02/25  1213 05/28/25  1050 04/29/25  1530 11/05/24  1420   INR 1.56* 1.76* 1.03 1.09 1.14       IMAGING:   None    PHYSICAL EXAM:   BP (!) 161/89 (BP Location: Right arm, Patient Position: Chair, Cuff Size: Adult Regular)   Pulse 68   Wt 78.1 kg (172 lb 1.6 oz)   SpO2 99%   BMI 25.41 kg/m      General: NAD, pleasant, normal mood and affect  Neuro: alert & oriented x 3, no focal deficits   CV: S1 S2, no murmurs, rubs or gallops, regular rate and rhythm, no peripheral edema  Pulm: bilateral breath sounds, clear to auscultation, unlabored breathing  Incision: incisions clean dry and intact without erythema, swelling or drainage    PROCEDURES: None       CURRENT MEDICATIONS:   Current Outpatient Medications   Medication Sig Dispense Refill    acetaminophen (TYLENOL) 500 MG tablet Take 1-2 tablets (500-1,000 mg) by mouth every 6 hours as needed for mild pain. 20 tablet 0    aspirin (ASA) 81 MG chewable tablet Take 2 tablets (162 mg) by mouth daily. 60 tablet 0    carvedilol (COREG) 6.25 MG tablet Take 0.5 tablets (3.125 mg) by mouth 2 times daily (with meals). 30 tablet 0    Coenzyme Q10 (COQ10 PO) Take by mouth every morning.      levothyroxine (SYNTHROID/LEVOTHROID) 200 MCG tablet Take 1 tablet by mouth every morning (before breakfast).      methoxy PEG-epoetin beta (MIRCERA) 200 MCG/0.3ML SOSY injectable syringe Inject 0.6 mcg/kg subcutaneously once. Given once ~1 month ago and once ~1.5 weeks ago following low Hgb labs      multivitamin w/minerals (MULTI-VITAMIN) tablet Take 1 tablet by mouth daily.      Naltrexone HCl, Pain, 4.5 MG CAPS Take 4.5 mg by mouth at bedtime.      nitroGLYcerin (NITROSTAT) 0.4 MG sublingual tablet Place 0.4 mg under the tongue every 5 minutes as needed for chest pain.      Omega-3 Fatty Acids (OMEGA-3 FISH OIL PO) Take 600 mg by mouth daily.      oxyCODONE (ROXICODONE) 5 MG tablet Take 1 tablet (5 mg) by mouth every 4 hours as needed for moderate pain. 8 tablet 0    polyethylene glycol (MIRALAX) 17 GM/Dose powder Take 17 g by mouth daily. 510 g 0    rosuvastatin (CRESTOR) 10 MG tablet Take  10 mg by mouth at bedtime.      UNABLE TO FIND Take 600 mg by mouth 3 times daily (with meals). Take 2 tablets by mouth three times daily with meals  MEDICATION NAME: Calcium aminoate 300 mg tablets      vitamin E 400 units TABS Take 400 Units by mouth every morning.      sevelamer HCl (RENAGEL) 800 MG tablet Take 800 mg by mouth 3 times daily (with meals)       No current facility-administered medications for this visit.       MAYA Hatch

## 2025-06-20 ENCOUNTER — OFFICE VISIT (OUTPATIENT)
Dept: CARDIOLOGY | Facility: CLINIC | Age: 75
End: 2025-06-20
Attending: SURGERY
Payer: COMMERCIAL

## 2025-06-20 VITALS
WEIGHT: 172.1 LBS | DIASTOLIC BLOOD PRESSURE: 89 MMHG | OXYGEN SATURATION: 99 % | SYSTOLIC BLOOD PRESSURE: 161 MMHG | HEART RATE: 68 BPM | BODY MASS INDEX: 25.41 KG/M2

## 2025-06-20 DIAGNOSIS — C67.2 MALIGNANT NEOPLASM OF LATERAL WALL OF URINARY BLADDER (H): Primary | ICD-10-CM

## 2025-06-20 PROCEDURE — 99024 POSTOP FOLLOW-UP VISIT: CPT | Performed by: PHYSICIAN ASSISTANT

## 2025-06-20 PROCEDURE — G0463 HOSPITAL OUTPT CLINIC VISIT: HCPCS

## 2025-06-20 ASSESSMENT — PAIN SCALES - GENERAL: PAINLEVEL_OUTOF10: NO PAIN (0)

## 2025-06-20 NOTE — NURSING NOTE
Chief Complaint   Patient presents with    Follow Up     POST-OP - Cabg Dr Bradley Cisneros were taken and medications reconciled.    Kirby Chávez, EMT  2:13 PM

## 2025-06-20 NOTE — LETTER
6/20/2025      RE: Cesar Rashid  [address] [facility] [address]       Dear Colleague,    Thank you for the opportunity to participate in the care of your patient, Cesar Rashid, at the [facility] [place] [facility]. Please see a copy of my visit note below.    CARDIOTHORACIC SURGERY FOLLOW-UP VISIT     Cesar Rashid   1950   [id number]      Reason for visit:  Post-op follow up    ASSESSMENT/PLAN:  Cesar Rashid is a 74 year old male status post CABG x3 on 6/3/2025 with Dr. Huerta.      Surgically doing well at home. Pain is overall controlled. Incision healing well without signs of infection. Increasing activity and strength.  Appears euvolemic.    Hemodynamics are stable. Medications reviewed and no changes were needed today.  Follow up with your PCP and cardiologist as scheduled.  Complete outpatient Cardiac Rehab.  Continue sternal precautions for 12 weeks from surgery date.   No driving for 4 weeks from surgery date.     Postoperative restrictions have been reviewed and all of the patient's questions were answered. Our contact information was given to the patient if he should have any further questions/concerns. No further follow up is needed with us.  The total time spent with the patient was 25 minutes, > 50% of which was spent in counseling and coordination of care.    Vi Grant PA-C  Cardiothoracic Surgery  Pager: [phone]   _____________________________________________________________________________________________________________    HPI: Cesar Rashid is a 74 y.o. male who was referred to CV Surgery for follow-up of CAD and cardiovascular evaluation in anticipation renal transplant.  He has history of end-stage renal disease on peritoneal dialysis, anemia of chronic disease, hypertension, hypothyroidism, CAD with inferior STEMI and VF cardiac arrest 9/6/23 s/p shockwave lithotripsy  and SURESH to RCA and SURESH x1 to RPLA, ischemic cardiomyopathy with LVEF of 45%.  Hospital course was generally unremarkable; he was followed by Nephrology for his ESRD and he resumed on PD on POD 1.  Patient was discharged home on 6/6/24 with weight 5 lb above his admission weight.  Presents to clinic for a routine follow-up appointment after surgery.    Since discharge, has been recovering well at home.    Discharge weight 172 lbs; weight today 171 lbs  States pain is well controlled. He continues to take very occasionally for pain management.   Denies sternal popping or clicking or incisional drainage/erythema.  Sleep is going ok at home but hard to sleep on back at all times so has been on side.   No psychiatric concerns.  Breathing has been stable/improving. Continues to work on C&DB. Denies SOB at rest, PND, orthopnea. No cough.   Participating in therapy at outside facility.    Has had a good appetite. Denies N/V/D/C.     ROS:  Review of symptoms otherwise negative unless commented on in HPI.     CBC RESULTS:   Recent Labs   Lab Test 06/06/25  0530 06/04/25  0550 06/03/25  0505 06/02/25  1818   WBC  --  8.5 7.3 9.1   HGB 10.3* 10.8* 10.9* 10.9*   HCT  --  33.8* 33.8* 32.7*   PLT  --  224 227 224     CMP RESULTS:  Recent Labs   Lab Test 06/06/25  0530 06/04/25  0717 06/04/25  0550 06/03/25  0617 06/03/25  0505 06/02/25  1619 06/02/25  1334 06/02/25  0629 05/28/25  1050   *  --  135  --  135   < > 135   < > 134*   POTASSIUM 4.1  --  4.3  --  4.9   < > 4.3   < > 3.3*   CHLORIDE 94*  --  96*  --  97*   < > 97*  --  94*   CO2 23  --  22  --  19*   < > 23  --  24   ANIONGAP 16*  --  17*  --  19*   < > 15  --  16*   * 103* 103*   < > 112*   < > 117*  114*   < > 97   BUN 60.4*  --  48.8*  --  42.8*   < > 34.6*  --  39.4*   CR 11.70*  --  11.40*  --  10.40*   < > 9.24*  --  10.80*   GFRESTIMATED 4*  --  4*  --  5*   < > 6*  --  5*   ISIDRO 7.8*  --  8.5*  --  8.9   < > 9.1  --  9.0   BILITOTAL  --   --   --    --  0.3  --  0.5  --  0.3   ALKPHOS  --   --   --   --  69  --  59  --  96   ALT  --   --   --   --  6  --  13  --  16   AST  --   --   --   --  28  --  29  --  24    < > = values in this interval not displayed.     Recent Labs   Lab Test 06/02/25  1334 06/02/25  1213 05/28/25  1050 04/29/25  1530 11/05/24  1420   INR 1.56* 1.76* 1.03 1.09 1.14       IMAGING:  None    PHYSICAL EXAM:   BP (!) 161/89 (BP Location: Right arm, Patient Position: Chair, Cuff Size: Adult Regular)   Pulse 68   Wt 78.1 kg (172 lb 1.6 oz)   SpO2 99%   BMI 25.41 kg/m      General: NAD, pleasant, normal mood and affect  Neuro: alert & oriented x 3, no focal deficits   CV: S1 S2, no murmurs, rubs or gallops, regular rate and rhythm, no peripheral edema  Pulm: bilateral breath sounds, clear to auscultation, unlabored breathing  Incision: incisions clean dry and intact without erythema, swelling or drainage    PROCEDURES: None       CURRENT MEDICATIONS:   Current Outpatient Medications   Medication Sig Dispense Refill     acetaminophen (TYLENOL) 500 MG tablet Take 1-2 tablets (500-1,000 mg) by mouth every 6 hours as needed for mild pain. 20 tablet 0     aspirin (ASA) 81 MG chewable tablet Take 2 tablets (162 mg) by mouth daily. 60 tablet 0     carvedilol (COREG) 6.25 MG tablet Take 0.5 tablets (3.125 mg) by mouth 2 times daily (with meals). 30 tablet 0     Coenzyme Q10 (COQ10 PO) Take by mouth every morning.       levothyroxine (SYNTHROID/LEVOTHROID) 200 MCG tablet Take 1 tablet by mouth every morning (before breakfast).       methoxy PEG-epoetin beta (MIRCERA) 200 MCG/0.3ML SOSY injectable syringe Inject 0.6 mcg/kg subcutaneously once. Given once ~1 month ago and once ~1.5 weeks ago following low Hgb labs       multivitamin w/minerals (MULTI-VITAMIN) tablet Take 1 tablet by mouth daily.       Naltrexone HCl, Pain, 4.5 MG CAPS Take 4.5 mg by mouth at bedtime.       nitroGLYcerin (NITROSTAT) 0.4 MG sublingual tablet Place 0.4 mg under the  tongue every 5 minutes as needed for chest pain.       Omega-3 Fatty Acids (OMEGA-3 FISH OIL PO) Take 600 mg by mouth daily.       oxyCODONE (ROXICODONE) 5 MG tablet Take 1 tablet (5 mg) by mouth every 4 hours as needed for moderate pain. 8 tablet 0     polyethylene glycol (MIRALAX) 17 GM/Dose powder Take 17 g by mouth daily. 510 g 0     rosuvastatin (CRESTOR) 10 MG tablet Take 10 mg by mouth at bedtime.       UNABLE TO FIND Take 600 mg by mouth 3 times daily (with meals). Take 2 tablets by mouth three times daily with meals  MEDICATION NAME: Calcium aminoate 300 mg tablets       vitamin E 400 units TABS Take 400 Units by mouth every morning.       sevelamer HCl (RENAGEL) 800 MG tablet Take 800 mg by mouth 3 times daily (with meals)       No current facility-administered medications for this visit.       MAYA Hatch       Please do not hesitate to contact me if you have any questions/concerns.     Sincerely,     Cardiovascular Thoracic Surgery

## 2025-07-02 ENCOUNTER — VIRTUAL VISIT (OUTPATIENT)
Dept: CARDIOLOGY | Facility: CLINIC | Age: 75
End: 2025-07-02
Payer: COMMERCIAL

## 2025-07-02 ENCOUNTER — TELEPHONE (OUTPATIENT)
Dept: CARDIOLOGY | Facility: CLINIC | Age: 75
End: 2025-07-02
Payer: COMMERCIAL

## 2025-07-02 VITALS
WEIGHT: 168 LBS | HEIGHT: 69 IN | BODY MASS INDEX: 24.88 KG/M2 | DIASTOLIC BLOOD PRESSURE: 101 MMHG | SYSTOLIC BLOOD PRESSURE: 197 MMHG

## 2025-07-02 DIAGNOSIS — Z99.2 ESRD (END STAGE RENAL DISEASE) ON DIALYSIS (H): ICD-10-CM

## 2025-07-02 DIAGNOSIS — Z95.1 S/P CABG (CORONARY ARTERY BYPASS GRAFT): Primary | ICD-10-CM

## 2025-07-02 DIAGNOSIS — I15.1 HYPERTENSION SECONDARY TO OTHER RENAL DISORDERS: ICD-10-CM

## 2025-07-02 DIAGNOSIS — I25.10 CORONARY ARTERY DISEASE INVOLVING NATIVE CORONARY ARTERY OF NATIVE HEART WITHOUT ANGINA PECTORIS: ICD-10-CM

## 2025-07-02 DIAGNOSIS — N18.6 ESRD (END STAGE RENAL DISEASE) ON DIALYSIS (H): ICD-10-CM

## 2025-07-02 PROCEDURE — 98006 SYNCH AUDIO-VIDEO EST MOD 30: CPT | Performed by: INTERNAL MEDICINE

## 2025-07-02 ASSESSMENT — PAIN SCALES - GENERAL: PAINLEVEL_OUTOF10: MODERATE PAIN (4)

## 2025-07-02 NOTE — NURSING NOTE
Current patient location: In [place]     Is the patient currently in the state of [place]? YES    Visit mode: VIDEO    If the visit is dropped, the patient can be reconnected by:VIDEO VISIT: Text to cell phone:   Telephone Information:   Mobile [phone]       Will anyone else be joining the visit? Pts spouse Rafaela   (If patient encounters technical issues they should call [phone]    Are changes needed to the allergy or medication list? No    Patient denies any changes and states that all information remains accurate since last reviewed/verified.     Are refills needed on medications prescribed by this physician? NO, does not believe so    Rooming Documentation:  Not applicable    Reason for visit: MICHELLE Whitaker MA VVF

## 2025-07-02 NOTE — PATIENT INSTRUCTIONS
Thank you for coming to the [facility] at [place]; please note the following instructions:    1. Follow up with Cardiology JIAN in 1 year    2. Recommendation to reach out to Primary Care Provider regarding Shingles         If you have any questions regarding your visit, please contact your care team:     CARDIOLOGY  TELEPHONE NUMBER   Deysi PEREZ, Registered Nurse  Angella COMER, Registered Nurse  Sonia GASPAR, Registered Medical Assistant  Zoila JOSE, Clinic Assistant  Zoila COMER, Visit Facilitator  Doyle GASPAR, Visit Facilitator [phone] (select option 1)    *After hours: [phone]   For Scheduling Appts:     [phone] (select option 1)    *After hours: [phone]   For the Device Clinic (Pacemakers and ICD's)  Yuridia ASHBY, Registered Nurse   During business hours: [phone]    *After business hours:  [phone] (select option 4)      Normal test result notifications will be released via [employer] or mailed within 7 business days.  All other test results, will be communicated via telephone once reviewed by your cardiologist.    If you need a medication refill, please contact your pharmacy.  Please allow 3 business days for your refill to be completed.    As always, thank you for trusting us with your health care needs!

## 2025-07-02 NOTE — PROGRESS NOTES
Virtual Visit Details    Type of service:  Video Visit   Video Start Time: 1:40 PM  Video End Time: 1:50 PM    Originating Location (pt. Location): Home    Distant Location (provider location):  On-site  [facility] Visit[facility]      HPI: Mr. Cesar Rashid is a 74 year old  male with PMH significant for    -HTN, well controlled  -CAD with inferior STEMI in September 2023 and VF cardiac arrest 9/6/23 s/p shockwave lithotripsy and SURESH to RCA and SURESH x1 to RPLA, ischemic cardiomyopathy with LVEF of 45% now recovered to 60%  -VT, used to be on amiodarone when he had MI; No longer on amiodarone  -Multivessel obstructive CAD  -ESRD on peritoneal dialysis being planned for kidney transplant  -ANCA vasculitis  -Chronic anemia, Sabianist, not accepting blood tranfusions    He is planned for CABG with Dr. Huerta  on May 22, 2025 and is setting up Cardiology care. He underwent an angiogram as work-up pre-kidney transplant and it showed multivessel obstructive CAD including moderate LM disease, so decision to surgically revascularise was made.   Patient was admitted to [facility] in September 2023 after inferior STEMI, c/b VF arrest,underwent shockwave lithotripsy and SURESH to RCA and SURESH x1 to RPLA, also had ICD placed during that admission. EF was 45% then, it has now recovered to 60%.  He reports doing well since that admission from Cardiology standpoint. No episodes of chest pain. Exercise tolerance is good,he can walk for as long as he likes, can easily go up a flight of stairs. No episodes of VT on most recent device check. BP at home usually 110-120s SBP. He denies SOB, light-headedness, papitations, edema.     Lifelong non-smoker.  No alcohol abuse.    Medications, personal, family, and social history reviewed with patient and revised.    Interval history 7/2/2025:    Patient underwent CABG 6/3/2000 25 x 3 (LIMA to LAD, SVG to PDA and SVG to first OM).   Patient has seen outpatient cardiac thoracic surgery 6/20/2025.  Reported doing well at that time.  Patient's hospital course was unremarkable.  He follows with nephrology with regards to ESRD and hypertension and he resumed on peritoneal dialysis on postop day 1.  He is not on transplant list yet.  He tells me that he developed shingles postop.  He has not reached out to his primary care physician yet.  He tells me that his blood pressure is running high since he had shingles.  He is trying to manage the pain with Tylenol.    PAST MEDICAL HISTORY:  Past Medical History:   Diagnosis Date    Acute ST elevation myocardial infarction (H)     ANCA-associated vasculitis (H)     Bladder cancer (H)     CAD (coronary artery disease)     Dyslipidemia     End stage renal disease (H)     HTN (hypertension)     ELBERT (iron deficiency anemia)     Ischemic cardiomyopathy     Malignant neoplasm of lateral wall of urinary bladder (H)     Pulmonary nodules     Transfusion of blood product refused for [employer]eterian reason     Jehova's witness       CURRENT MEDICATIONS:  Current Outpatient Medications   Medication Sig Dispense Refill    acetaminophen (TYLENOL) 500 MG tablet Take 1-2 tablets (500-1,000 mg) by mouth every 6 hours as needed for mild pain. 20 tablet 0    aspirin (ASA) 81 MG chewable tablet Take 2 tablets (162 mg) by mouth daily. 60 tablet 0    carvedilol (COREG) 6.25 MG tablet Take 0.5 tablets (3.125 mg) by mouth 2 times daily (with meals). 30 tablet 0    Coenzyme Q10 (COQ10 PO) Take by mouth every morning.      levothyroxine (SYNTHROID/LEVOTHROID) 200 MCG tablet Take 1 tablet by mouth every morning (before breakfast).      methoxy PEG-epoetin beta (MIRCERA) 200 MCG/0.3ML SOSY injectable syringe Inject 0.6 mcg/kg subcutaneously once. Given once ~1 month ago and once ~1.5 weeks ago following low Hgb labs      multivitamin w/minerals (MULTI-VITAMIN) tablet Take 1 tablet by mouth daily.      Naltrexone HCl, Pain, 4.5 MG CAPS Take  4.5 mg by mouth at bedtime.      nitroGLYcerin (NITROSTAT) 0.4 MG sublingual tablet Place 0.4 mg under the tongue every 5 minutes as needed for chest pain.      Omega-3 Fatty Acids (OMEGA-3 FISH OIL PO) Take 600 mg by mouth daily.      oxyCODONE (ROXICODONE) 5 MG tablet Take 1 tablet (5 mg) by mouth every 4 hours as needed for moderate pain. 8 tablet 0    polyethylene glycol (MIRALAX) 17 GM/Dose powder Take 17 g by mouth daily. 510 g 0    rosuvastatin (CRESTOR) 10 MG tablet Take 10 mg by mouth at bedtime.      sevelamer HCl (RENAGEL) 800 MG tablet Take 800 mg by mouth 3 times daily (with meals)      UNABLE TO FIND Take 600 mg by mouth 3 times daily (with meals). Take 2 tablets by mouth three times daily with meals  MEDICATION NAME: Calcium aminoate 300 mg tablets      vitamin E 400 units TABS Take 400 Units by mouth every morning.         PAST SURGICAL HISTORY:  Past Surgical History:   Procedure Laterality Date    BYPASS GRAFT ARTERY CORONARY N/A 6/2/2025    Procedure: Median Sternotomy, CORONARY ARTERY BYPASS GRAFT x 3, Left Internal Mammary [place], Left Endoscopic Saphenous Vein [place], on Cardiopulmonary Bypass, Transesophageal Echocardiogram by Anesthesia;  Surgeon: Ridge Huerta MD;  Location: [facility] OR    CV CORONARY ANGIOGRAM N/A 3/20/2025    Procedure: Coronary Angiogram;  Surgeon: Pepito Helton MD;  Location:  [facility]ARD[facility] CATH LAB    repaired ruptured globe Right     TOTAL HIP ARTHROPLASTY Left 04/29/2014    TOTAL HIP ARTHROPLASTY Right 09/02/2014    VASCULAR SURGERY      chest port, peritoneal dialysis catheter       ALLERGIES:     Allergies   Allergen Reactions    Atorvastatin Muscle Pain (Myalgia)     Myalgias (Muscle pain)    Levofloxacin Other (See Comments)    Omeprazole Nausea and Vomiting     Patient denied - no intolerance or allergy to this    Amlodipine Palpitations       FAMILY HISTORY:  Family History   Problem Relation Age of Onset    Colon Cancer Mother 80    Coronary Artery  Disease Mother     Diabetes Mother     Coronary Artery Disease Father     Thyroid Disease Father     Alcoholism Brother     Diabetes Brother     Anesthesia Reaction No family hx of          SOCIAL HISTORY:  Social History     Tobacco Use    Smoking status: Never     Passive exposure: Never    Smokeless tobacco: Never   Substance Use Topics    Alcohol use: Not Currently     Comment: Very little - maybe 2 drinks a year    Drug use: Never       ROS:   Constitutional: No fever, chills, or sweats. Weight stable.   Cardiovascular: As per HPI.     Exam:  Physical Exam Elements attainable via telehealth:     ? Constitutional - alert and no distress  ? Eyes - no redness, no discharge  ? Respiratory - no cough, no labored breathing  ? Skin - no discoloration or lesions on the face or the arm.  ? Neurological -alert, normal speech,and affect, no tremor.     I have reviewed the labs and personally reviewed the imaging below and made my comment in the assessment and plan.    Labs:  CBC RESULTS:   Lab Results   Component Value Date    WBC 8.5 06/04/2025    RBC 3.51 (L) 06/04/2025    HGB 10.3 (L) 06/06/2025    HCT 33.8 (L) 06/04/2025    MCV 94 06/06/2025    MCH 30.8 06/04/2025    MCHC 32.0 06/04/2025    RDW 16.9 (H) 06/04/2025     06/04/2025       BMP RESULTS:  Lab Results   Component Value Date     (L) 06/06/2025    POTASSIUM 4.1 06/06/2025    POTASSIUM 4.3 06/02/2025    CHLORIDE 94 (L) 06/06/2025    CO2 23 06/06/2025    ANIONGAP 16 (H) 06/06/2025     (H) 06/06/2025     (H) 06/04/2025    BUN 60.4 (H) 06/06/2025    CR 11.70 (H) 06/06/2025    GFRESTIMATED 4 (L) 06/06/2025    ISIDRO 7.8 (L) 06/06/2025      EKG 5/1/2025:        Echocardiogram 7/29/2024:  Global and regional left ventricular function is normal with an EF of 55-60%.  Global right ventricular function is normal. The right ventricle is normal  size.  No significant valvular abnormalities present.  There is mild dilation at the level of the  ascending aorta (4.2 cm, indexed  value 2.21 cm/m2).  IVC diameter <2.1 cm collapsing >50% with sniff suggests a normal RA pressure  of 3 mmHg.  There is no prior study for direct comparison.      Angiogram 3/20/25:    Ost LM to Mid LM lesion is 40% stenosed.    Ost LAD to Prox LAD lesion is 40% stenosed.    Prox LAD to Mid LAD lesion is 70% stenosed.    Mid LAD lesion is 70% stenosed.    1st Diag lesion is 50% stenosed.    Prox Cx lesion is 80% stenosed.    1st Mrg lesion is 40% stenosed.    Mid Cx to Dist Cx lesion is 70% stenosed.    Prox RCA lesion is 70% stenosed.    Prox RCA to Mid RCA lesion is 100% stenosed.     Moderate lesion of the proximal left main coronary artery.  Moderate diffuse disease of the left anterior descending artery. There are severe tandem 70% obstructive lesions distal to the takeoff of the first diagonal.  There is an 80% obstructive lesion of the proximal left circumflex artery as well as a 70% obstructive lesion distal to the takeoff of OM2.  There is a focal 70% obstructive lesion of the proximal right coronary artery. The previously placed drug-eluting stent has a 100% chronic in-stent restenosis. The distal vessel fills via R-R and L-R collaterals.  Successful uncomplicated right radial arterial access with hemostasis by TR band placement.    Device check 12/11/24:    Routine remote transmission for this [employer] single chamber ICD.   -Programmed: VVI 40 bpm   -Presenting rhythm: VS 73 bpm   -Battery Status: 12.5 years   -Lead Status: Stable lead trending, within normal limits.  No concerns.   -Heart rate trending graphs show good rate variation.   -Pacing: <0.1%    -Atrial high rates: 0   -Ventricular high rates: 0   -ICD therapies: 0 since implant   -Short interval count: 0   -Optivol:  Stable lung fluid status.   Plan: No device concerns, home remote check every 3 months and return to clinic 10/2025 .   Indication: Cardiac Arrest       Operative note 6/3/2025  Surgeon  Ridge Huerta  1. Coronary bypass grafting x 3 (left internal mammary artery to left anterior descending artery, saphenous vein graft to PDA, saphenous vein graft to 1st OM artery).     Assessment and Plan:   The patient is a 74 y.o M with PMHx of ESRD on peritoneal dialysis being planned for kidney transplant and multivessel obstructive CAD,  underwent CABG x 3 on 6/3/2025.  Following up with us today.    #CAD with inferior STEMI in September 2023 and VF cardiac arrest 9/6/23 s/p shockwave lithotripsy and SURESH to RCA and SURESH x1 to RPLA  # Status post CABG x 3 6/3/2025  #Multivessel obstructive CAD on most recent angiogram including moderate LM disease, being planned for CABG  # Ischemic cardiomyopathy with LVEF of 45% now recovered to 60%  # VT, used to be on amiodarone, now status post ICD placement  #ESRD on PD  # Hypertension     Patient currently does not report any symptoms from a cardiac standpoint, no chest pain or shortness of breath, good exercise tolerance.  Blood pressure is not well-controlled.  Patient has recently reached out to nephrology and carvedilol dose was increased.  He is on higher dose of aspirin since CABG.  On Crestor 10 mg, and LDL is well-controlled 55. echo from July 2024 showing normal biventricular function without major valvular abnormalities.    Plan summary:  - Patient is on 162 mg of aspirin since CABG.  Recommended to stay on the higher dose for at least 3 months and then he can drop the dose to once a day 81 mg  - Recommend to reach out to PCP with regards to recent shingles  - He follows with nephrology with regards to blood pressure control  - Since he is revascularized now he is he can proceed to planned transplant from cardiac standpoint if he is deemed a good candidate.    Return to clinic 1 year or earlier as needed.    I have personally spent 22 minutes including precharting, medical documentation, and video visit     Letty WALL MD  [facility] Division of  Cardiology  Securely message with [employer]

## 2025-07-02 NOTE — TELEPHONE ENCOUNTER
Spoke with pt who is able to come in for an earlier appt today, but reports he has shingles.    Angella IVY approved virtual visit for 1:30pm today.    Kept pt's October appt just in case we need another 3mo follow up after this virtual today.    Ginette Lucas on 7/2/2025 at 8:05 AM

## 2025-07-02 NOTE — LETTER
7/2/2025      RE: Cesar Rashid  [address] [facility] [address]       Dear Colleague,    Thank you for the opportunity to participate in the care of your patient, Cesar Rashid, at the [facility] HEART [facility]. Please see a copy of my visit note below.    Virtual Visit Details    Type of service:  Video Visit   Video Start Time: 1:40 PM  Video End Time: 1:50 PM    Originating Location (pt. Location): Home    Distant Location (provider location):  On-site  Platform used for Video Visit: [facility]      HPI: Mr. Cesar Rashid is a 74 year old  male with PMH significant for    -HTN, well controlled  -CAD with inferior STEMI in September 2023 and VF cardiac arrest 9/6/23 s/p shockwave lithotripsy and SURESH to RCA and SURESH x1 to RPLA, ischemic cardiomyopathy with LVEF of 45% now recovered to 60%  -VT, used to be on amiodarone when he had MI; No longer on amiodarone  -Multivessel obstructive CAD  -ESRD on peritoneal dialysis being planned for kidney transplant  -ANCA vasculitis  -Chronic anemia, Denominational, not accepting blood tranfusions    He is planned for CABG with Dr. Huerta  on May 22, 2025 and is setting up Cardiology care. He underwent an angiogram as work-up pre-kidney transplant and it showed multivessel obstructive CAD including moderate LM disease, so decision to surgically revascularise was made.   Patient was admitted to [facility] in September 2023 after inferior STEMI, c/b VF arrest,underwent shockwave lithotripsy and SURESH to RCA and SURESH x1 to RPLA, also had ICD placed during that admission. EF was 45% then, it has now recovered to 60%.  He reports doing well since that admission from Cardiology standpoint. No episodes of chest pain. Exercise tolerance is good,he can walk for as long as he likes, can easily go up a flight of stairs. No episodes of VT on most recent  device check. BP at home usually 110-120s SBP. He denies SOB, light-headedness, papitations, edema.     Lifelong non-smoker.  No alcohol abuse.    Medications, personal, family, and social history reviewed with patient and revised.    Interval history 7/2/2025:    Patient underwent CABG 6/3/2000 25 x 3 (LIMA to LAD, SVG to PDA and SVG to first OM).  Patient has seen outpatient cardiac thoracic surgery 6/20/2025.  Reported doing well at that time.  Patient's hospital course was unremarkable.  He follows with nephrology with regards to ESRD and hypertension and he resumed on peritoneal dialysis on postop day 1.  He is not on transplant list yet.  He tells me that he developed shingles postop.  He has not reached out to his primary care physician yet.  He tells me that his blood pressure is running high since he had shingles.  He is trying to manage the pain with Tylenol.    PAST MEDICAL HISTORY:  Past Medical History:   Diagnosis Date     Acute ST elevation myocardial infarction (H)      ANCA-associated vasculitis (H)      Bladder cancer (H)      CAD (coronary artery disease)      Dyslipidemia      End stage renal disease (H)      HTN (hypertension)      ELBERT (iron deficiency anemia)      Ischemic cardiomyopathy      Malignant neoplasm of lateral wall of urinary bladder (H)      Pulmonary nodules      Transfusion of blood product refused for Denominational reason     Jehova's witness       CURRENT MEDICATIONS:  Current Outpatient Medications   Medication Sig Dispense Refill     acetaminophen (TYLENOL) 500 MG tablet Take 1-2 tablets (500-1,000 mg) by mouth every 6 hours as needed for mild pain. 20 tablet 0     aspirin (ASA) 81 MG chewable tablet Take 2 tablets (162 mg) by mouth daily. 60 tablet 0     carvedilol (COREG) 6.25 MG tablet Take 0.5 tablets (3.125 mg) by mouth 2 times daily (with meals). 30 tablet 0     Coenzyme Q10 (COQ10 PO) Take by mouth every morning.       levothyroxine (SYNTHROID/LEVOTHROID) 200 MCG tablet  Take 1 tablet by mouth every morning (before breakfast).       methoxy PEG-epoetin beta (MIRCERA) 200 MCG/0.3ML SOSY injectable syringe Inject 0.6 mcg/kg subcutaneously once. Given once ~1 month ago and once ~1.5 weeks ago following low Hgb labs       multivitamin w/minerals (MULTI-VITAMIN) tablet Take 1 tablet by mouth daily.       Naltrexone HCl, Pain, 4.5 MG CAPS Take 4.5 mg by mouth at bedtime.       nitroGLYcerin (NITROSTAT) 0.4 MG sublingual tablet Place 0.4 mg under the tongue every 5 minutes as needed for chest pain.       Omega-3 Fatty Acids (OMEGA-3 FISH OIL PO) Take 600 mg by mouth daily.       oxyCODONE (ROXICODONE) 5 MG tablet Take 1 tablet (5 mg) by mouth every 4 hours as needed for moderate pain. 8 tablet 0     polyethylene glycol (MIRALAX) 17 GM/Dose powder Take 17 g by mouth daily. 510 g 0     rosuvastatin (CRESTOR) 10 MG tablet Take 10 mg by mouth at bedtime.       sevelamer HCl (RENAGEL) 800 MG tablet Take 800 mg by mouth 3 times daily (with meals)       UNABLE TO FIND Take 600 mg by mouth 3 times daily (with meals). Take 2 tablets by mouth three times daily with meals  MEDICATION NAME: Calcium aminoate 300 mg tablets       vitamin E 400 units TABS Take 400 Units by mouth every morning.         PAST SURGICAL HISTORY:  Past Surgical History:   Procedure Laterality Date     BYPASS GRAFT ARTERY CORONARY N/A 6/2/2025    Procedure: Median Sternotomy, CORONARY ARTERY BYPASS GRAFT x 3, Left Internal Mammary Maljamar, Left Endoscopic Saphenous Vein Maljamar, on Cardiopulmonary Bypass, Transesophageal Echocardiogram by Anesthesia;  Surgeon: Ridge Huerta MD;  Location: [facility] OR     CV CORONARY ANGIOGRAM N/A 3/20/2025    Procedure: Coronary Angiogram;  Surgeon: Pepito Helton MD;  Location:  HEART CARDIAC CATH LAB     repaired ruptured globe Right      TOTAL HIP ARTHROPLASTY Left 04/29/2014     TOTAL HIP ARTHROPLASTY Right 09/02/2014     VASCULAR SURGERY      chest port, peritoneal dialysis catheter        ALLERGIES:     Allergies   Allergen Reactions     Atorvastatin Muscle Pain (Myalgia)     Myalgias (Muscle pain)     Levofloxacin Other (See Comments)     Omeprazole Nausea and Vomiting     Patient denied - no intolerance or allergy to this     Amlodipine Palpitations       FAMILY HISTORY:  Family History   Problem Relation Age of Onset     Colon Cancer Mother 80     Coronary Artery Disease Mother      Diabetes Mother      Coronary Artery Disease Father      Thyroid Disease Father      Alcoholism Brother      Diabetes Brother      Anesthesia Reaction No family hx of          SOCIAL HISTORY:  Social History     Tobacco Use     Smoking status: Never     Passive exposure: Never     Smokeless tobacco: Never   Substance Use Topics     Alcohol use: Not Currently     Comment: Very little - maybe 2 drinks a year     Drug use: Never       ROS:   Constitutional: No fever, chills, or sweats. Weight stable.   Cardiovascular: As per HPI.     Exam:  Physical Exam Elements attainable via telehealth:     ? Constitutional - alert and no distress  ? Eyes - no redness, no discharge  ? Respiratory - no cough, no labored breathing  ? Skin - no discoloration or lesions on the face or the arm.  ? Neurological -alert, normal speech,and affect, no tremor.     I have reviewed the labs and personally reviewed the imaging below and made my comment in the assessment and plan.    Labs:  CBC RESULTS:   Lab Results   Component Value Date    WBC 8.5 06/04/2025    RBC 3.51 (L) 06/04/2025    HGB 10.3 (L) 06/06/2025    HCT 33.8 (L) 06/04/2025    MCV 94 06/06/2025    MCH 30.8 06/04/2025    MCHC 32.0 06/04/2025    RDW 16.9 (H) 06/04/2025     06/04/2025       BMP RESULTS:  Lab Results   Component Value Date     (L) 06/06/2025    POTASSIUM 4.1 06/06/2025    POTASSIUM 4.3 06/02/2025    CHLORIDE 94 (L) 06/06/2025    CO2 23 06/06/2025    ANIONGAP 16 (H) 06/06/2025     (H) 06/06/2025     (H) 06/04/2025    BUN 60.4 (H)  06/06/2025    CR 11.70 (H) 06/06/2025    GFRESTIMATED 4 (L) 06/06/2025    ISIDRO 7.8 (L) 06/06/2025      EKG 5/1/2025:        Echocardiogram 7/29/2024:  Global and regional left ventricular function is normal with an EF of 55-60%.  Global right ventricular function is normal. The right ventricle is normal  size.  No significant valvular abnormalities present.  There is mild dilation at the level of the ascending aorta (4.2 cm, indexed  value 2.21 cm/m2).  IVC diameter <2.1 cm collapsing >50% with sniff suggests a normal RA pressure  of 3 mmHg.  There is no prior study for direct comparison.      Angiogram 3/20/25:     Ost LM to Mid LM lesion is 40% stenosed.     Ost LAD to Prox LAD lesion is 40% stenosed.     Prox LAD to Mid LAD lesion is 70% stenosed.     Mid LAD lesion is 70% stenosed.     1st Diag lesion is 50% stenosed.     Prox Cx lesion is 80% stenosed.     1st Mrg lesion is 40% stenosed.     Mid Cx to Dist Cx lesion is 70% stenosed.     Prox RCA lesion is 70% stenosed.     Prox RCA to Mid RCA lesion is 100% stenosed.     Moderate lesion of the proximal left main coronary artery.  Moderate diffuse disease of the left anterior descending artery. There are severe tandem 70% obstructive lesions distal to the takeoff of the first diagonal.  There is an 80% obstructive lesion of the proximal left circumflex artery as well as a 70% obstructive lesion distal to the takeoff of OM2.  There is a focal 70% obstructive lesion of the proximal right coronary artery. The previously placed drug-eluting stent has a 100% chronic in-stent restenosis. The distal vessel fills via R-R and L-R collaterals.  Successful uncomplicated right radial arterial access with hemostasis by TR band placement.    Device check 12/11/24:    Routine remote transmission for this [employer] single chamber ICD.   -Programmed: VVI 40 bpm   -Presenting rhythm: VS 73 bpm   -Battery Status: 12.5 years   -Lead Status: Stable lead trending, within  normal limits.  No concerns.   -Heart rate trending graphs show good rate variation.   -Pacing: <0.1%    -Atrial high rates: 0   -Ventricular high rates: 0   -ICD therapies: 0 since implant   -Short interval count: 0   -Optivol:  Stable lung fluid status.   Plan: No device concerns, home remote check every 3 months and return to clinic 10/2025 .   Indication: Cardiac Arrest       Operative note 6/3/2025  Surgeon Ridge Huerta  1. Coronary bypass grafting x 3 (left internal mammary artery to left anterior descending artery, saphenous vein graft to PDA, saphenous vein graft to 1st OM artery).     Assessment and Plan:   The patient is a 74 y.o M with PMHx of ESRD on peritoneal dialysis being planned for kidney transplant and multivessel obstructive CAD,  underwent CABG x 3 on 6/3/2025.  Following up with us today.    #CAD with inferior STEMI in September 2023 and VF cardiac arrest 9/6/23 s/p shockwave lithotripsy and SURESH to RCA and SURESH x1 to RPLA  # Status post CABG x 3 6/3/2025  #Multivessel obstructive CAD on most recent angiogram including moderate LM disease, being planned for CABG  # Ischemic cardiomyopathy with LVEF of 45% now recovered to 60%  # VT, used to be on amiodarone, now status post ICD placement  #ESRD on PD  # Hypertension     Patient currently does not report any symptoms from a cardiac standpoint, no chest pain or shortness of breath, good exercise tolerance.  Blood pressure is not well-controlled.  Patient has recently reached out to nephrology and carvedilol dose was increased.  He is on higher dose of aspirin since CABG.  On Crestor 10 mg, and LDL is well-controlled 55. echo from July 2024 showing normal biventricular function without major valvular abnormalities.    Plan summary:  - Patient is on 162 mg of aspirin since CABG.  Recommended to stay on the higher dose for at least 3 months and then he can drop the dose to once a day 81 mg  - Recommend to reach out to PCP with regards to recent  shingles  - He follows with nephrology with regards to blood pressure control  - Since he is revascularized now he is he can proceed to planned transplant from cardiac standpoint if he is deemed a good candidate.    Return to clinic 1 year or earlier as needed.    I have personally spent 22 minutes including precharting, medical documentation, and video visit     Letty WALL MD  [facility] Division of Cardiology  Securely message with M2G         Please do not hesitate to contact me if you have any questions/concerns.     Sincerely,     Letty Wall MD

## 2025-08-08 ENCOUNTER — MYC MEDICAL ADVICE (OUTPATIENT)
Dept: CARDIOLOGY | Facility: CLINIC | Age: 75
End: 2025-08-08
Payer: COMMERCIAL

## 2025-08-21 ENCOUNTER — TELEPHONE (OUTPATIENT)
Dept: TRANSPLANT | Facility: CLINIC | Age: 75
End: 2025-08-21
Payer: COMMERCIAL

## 2025-08-21 DIAGNOSIS — C67.2 MALIGNANT NEOPLASM OF LATERAL WALL OF URINARY BLADDER (H): Primary | ICD-10-CM

## 2025-08-27 ENCOUNTER — COMMITTEE REVIEW (OUTPATIENT)
Dept: TRANSPLANT | Facility: CLINIC | Age: 75
End: 2025-08-27
Payer: COMMERCIAL

## 2025-08-27 DIAGNOSIS — C67.2 MALIGNANT NEOPLASM OF LATERAL WALL OF URINARY BLADDER (H): Primary | ICD-10-CM

## (undated) DEVICE — SOL WATER IRRIG 1000ML BOTTLE 2F7114

## (undated) DEVICE — CLIP HORIZON MED BLUE 002200

## (undated) DEVICE — ANTIFOG SOLUTION W/FOAM PAD 31142527

## (undated) DEVICE — TUBING SUCTION DRAINAGE PLEURAL DUAL 8884714200

## (undated) DEVICE — DRAPE NEW SLUSH/WARMER HUSHSLUSH 2.0 ESD340 ESD340

## (undated) DEVICE — SU PROLENE 4-0 SHDA 36" 8521H

## (undated) DEVICE — SPONGE LAP 12X12" X8425

## (undated) DEVICE — SUCTION DRY CHEST DRAIN OASIS 3600-100

## (undated) DEVICE — RX SURGIFLO HEMOSTATIC MATRIX W/THROMBIN 8ML 2994

## (undated) DEVICE — SU STEEL MYO/WIRE II STERNOTOMY 8 BE-1 3X14" 048-217

## (undated) DEVICE — BNDG ELASTIC 4"X5YDS STERILE 6611-4S

## (undated) DEVICE — LEAD PACER MYOCARDIAL BIPOLAR TEMPORARY 53CM 6495F

## (undated) DEVICE — TUBING INSUFFLATION PNEUMOCLEAR 0620050100

## (undated) DEVICE — PUNCH AORTIC 4.0MMX8" RCB40

## (undated) DEVICE — TOURNIQUET VASCULAR KIT 7 1/2" 79012

## (undated) DEVICE — SLEEVE TR BAND RADIAL COMPRESSION DEVICE 24CM TRB24-REG

## (undated) DEVICE — CLIP SPRING FOGARTY SOFTJAW CSOFT6

## (undated) DEVICE — CATH ANGIO SUPERTORQUE PLUS JL4 6FRX100CM 533620

## (undated) DEVICE — SU ETHIBOND 0 CT-1 CR 8X18" CX21D

## (undated) DEVICE — DEVICE TISSUE STABILIZATION OCTOBASE 28707

## (undated) DEVICE — SYR 01ML 27GA 0.5" NDL TBC 309623

## (undated) DEVICE — SURGICEL HEMOSTAT 4X8" 1952

## (undated) DEVICE — SU VICRYL 0 CTX 36" J370H

## (undated) DEVICE — PACK HEART LEFT CUSTOM

## (undated) DEVICE — DRSG PRIMAPORE 02X3" 7133

## (undated) DEVICE — GW VASC .035IN DIA 260CML 7CML 3 MM RADIUS J CURVE 502455

## (undated) DEVICE — BNDG ELASTIC 6"X5YDS STERILE 6611-6S

## (undated) DEVICE — CANNULA VESSEL 3ML BEVELED DPL 30000

## (undated) DEVICE — TUBING PRESSURE 30"

## (undated) DEVICE — DRAPE IOBAN INCISE 23X17" 6650EZ

## (undated) DEVICE — DRAIN CHEST TUBE 36FR STR 8036

## (undated) DEVICE — SURGICEL POWDER ABSORBABLE HEMOSTAT 3GM 3013SP

## (undated) DEVICE — BLADE KNIFE BEAVER MICROSHARP GREEN 377515

## (undated) DEVICE — PROTECTOR ARM LG FOAM TRENDELENBURG TAP200

## (undated) DEVICE — BLADE KNIFE BEAVER MINI STR BEAVER6900

## (undated) DEVICE — TIES BANDING T50R

## (undated) DEVICE — CATH ANGIO SUPERTORQUE PLUS JR4 6FRX100CM 533621

## (undated) DEVICE — ESU ELEC BLADE E-SEP INSULATED NEPTUNE 70MM 0703-070-002

## (undated) DEVICE — SU SILK 0 TIE 6X30" A306H

## (undated) DEVICE — TAPE MEDIPORE 4"X2YD 2864

## (undated) DEVICE — SU PROLENE 6-0 C-1DA 4X24" M8726

## (undated) DEVICE — KIT HAND CONTROL ACIST 014644 AR-P54

## (undated) DEVICE — SOL NACL 0.9% INJ 1000ML BAG 2B1324X

## (undated) DEVICE — SUCTION MANIFOLD NEPTUNE 2 SYS 4 PORT 0702-020-000

## (undated) DEVICE — DRSG ABDOMINAL 07 1/2X8" 7197D

## (undated) DEVICE — ESU ELEC BLADE E-SEP INSULATED NEPTUNE 165MM 0703-165-002

## (undated) DEVICE — KIT ENDO VASOVIEW HEMOPRO 2 VH-4000

## (undated) DEVICE — LINEN TOWEL PACK X6 WHITE 5487

## (undated) DEVICE — SU DERMABOND ADVANCED .7ML DNX12

## (undated) DEVICE — DRAIN CHEST TUBE RIGHT ANGLED 28FR 8128

## (undated) DEVICE — DRSG TELFA 3X8" 1238

## (undated) DEVICE — WIPES FOLEY CARE SURESTEP PROVON DFC100

## (undated) DEVICE — SU RETRACT-O-TAPE 1041

## (undated) DEVICE — SU PROLENE 4-0 RB-1DA 36" 8557H

## (undated) DEVICE — PACK ADULT HEART UMMC PV15CG92D

## (undated) DEVICE — SUCTION CATH AIRLIFE TRI-FLO W/CONTROL PORT 14FR  T60C

## (undated) DEVICE — SU ETHIBOND 2-0 SHDA 30" X563H

## (undated) DEVICE — DEFIB PRO-PADZ LVP LQD GEL ADULT 8900-2105-01

## (undated) DEVICE — SU VICRYL 2-0 CT-1 27" UND J259H

## (undated) DEVICE — SU STEEL 6 CCS 4X18" M654G

## (undated) DEVICE — DRSG DRAIN 4X4" 7086

## (undated) DEVICE — SU PLEDGET SOFT TFE 3/8"X3/26"X1/16" PCP40

## (undated) DEVICE — SU PROLENE 5-0 RB-2DA 30" 8710H

## (undated) DEVICE — BLADE SAW STRK STERNAL 6207-97-101

## (undated) DEVICE — CABLE MYO/LEAD PACING WHITE DISP 019-530

## (undated) DEVICE — BONE WAX 2.5GM W31G

## (undated) DEVICE — BLADE KNIFE SURG 15C 371716

## (undated) DEVICE — DECANTER BAG 2002S

## (undated) DEVICE — CLIP HORIZON SM RED WIDE SLOT 001201

## (undated) DEVICE — SPECIMEN CONTAINER 5OZ STERILE 2600SA

## (undated) DEVICE — SU VICRYL+ 3-0 FS1 27IN UND VCP442H

## (undated) DEVICE — GLOVE BIOGEL PI SZ 7.0 40870

## (undated) DEVICE — SOL NACL 0.9% IRRIG 3000ML BAG 2B7127

## (undated) DEVICE — SU PROLENE 6-0 C-1DA 30" 8706H

## (undated) DEVICE — PREP CHLORAPREP 26ML TINTED HI-LITE ORANGE 930815

## (undated) DEVICE — SU ETHIBOND 3-0 BBDA 36" X588H

## (undated) DEVICE — LINEN TOWEL PACK X30 5481

## (undated) DEVICE — MANIFOLD KIT ANGIO AUTOMATED 014613

## (undated) DEVICE — SOL NACL 0.9% IRRIG 1000ML BOTTLE 2F7124

## (undated) DEVICE — WAND SUCTION LP SOFT 15.2CM SU-22702

## (undated) DEVICE — CLIP HORIZON LG ORANGE 004200

## (undated) DEVICE — SHTH INTRO 0.021IN ID 6FR DIA

## (undated) DEVICE — Device

## (undated) DEVICE — SU PROLENE 7-0 BV-1DA 4X24" M8702

## (undated) RX ORDER — CHLORHEXIDINE GLUCONATE ORAL RINSE 1.2 MG/ML
SOLUTION DENTAL
Status: DISPENSED
Start: 2025-06-02

## (undated) RX ORDER — GABAPENTIN 100 MG/1
CAPSULE ORAL
Status: DISPENSED
Start: 2025-06-02

## (undated) RX ORDER — HEPARIN SODIUM 1000 [USP'U]/ML
INJECTION, SOLUTION INTRAVENOUS; SUBCUTANEOUS
Status: DISPENSED
Start: 2025-06-02

## (undated) RX ORDER — FENTANYL CITRATE 50 UG/ML
INJECTION, SOLUTION INTRAMUSCULAR; INTRAVENOUS
Status: DISPENSED
Start: 2025-06-02

## (undated) RX ORDER — PROPOFOL 10 MG/ML
INJECTION, EMULSION INTRAVENOUS
Status: DISPENSED
Start: 2025-06-02

## (undated) RX ORDER — ALBUMIN (HUMAN) 12.5 G/50ML
SOLUTION INTRAVENOUS
Status: DISPENSED

## (undated) RX ORDER — HYDROMORPHONE HYDROCHLORIDE 1 MG/ML
INJECTION, SOLUTION INTRAMUSCULAR; INTRAVENOUS; SUBCUTANEOUS
Status: DISPENSED
Start: 2025-06-02

## (undated) RX ORDER — CEFAZOLIN SODIUM 1 G/3ML
INJECTION, POWDER, FOR SOLUTION INTRAMUSCULAR; INTRAVENOUS
Status: DISPENSED
Start: 2025-06-02

## (undated) RX ORDER — HEPARIN SODIUM 1000 [USP'U]/ML
INJECTION, SOLUTION INTRAVENOUS; SUBCUTANEOUS
Status: DISPENSED
Start: 2025-03-20

## (undated) RX ORDER — FENTANYL CITRATE-0.9 % NACL/PF 10 MCG/ML
PLASTIC BAG, INJECTION (ML) INTRAVENOUS
Status: DISPENSED
Start: 2025-06-02

## (undated) RX ORDER — ACETAMINOPHEN 325 MG/1
TABLET ORAL
Status: DISPENSED
Start: 2025-06-02

## (undated) RX ORDER — ASPIRIN 81 MG/1
TABLET ORAL
Status: DISPENSED
Start: 2025-06-02

## (undated) RX ORDER — CALCIUM CHLORIDE 100 MG/ML
INJECTION INTRAVENOUS; INTRAVENTRICULAR
Status: DISPENSED

## (undated) RX ORDER — NITROGLYCERIN 5 MG/ML
VIAL (ML) INTRAVENOUS
Status: DISPENSED
Start: 2025-03-20

## (undated) RX ORDER — PAPAVERINE HYDROCHLORIDE 30 MG/ML
INJECTION INTRAMUSCULAR; INTRAVENOUS
Status: DISPENSED
Start: 2025-06-02

## (undated) RX ORDER — FENTANYL CITRATE 50 UG/ML
INJECTION, SOLUTION INTRAMUSCULAR; INTRAVENOUS
Status: DISPENSED
Start: 2025-03-20

## (undated) RX ORDER — NICARDIPINE HCL-0.9% SOD CHLOR 1 MG/10 ML
SYRINGE (ML) INTRAVENOUS
Status: DISPENSED
Start: 2025-03-20

## (undated) RX ORDER — FENTANYL CITRATE-0.9 % NACL/PF 10 MCG/ML
PLASTIC BAG, INJECTION (ML) INTRAVENOUS
Status: DISPENSED

## (undated) RX ORDER — FAMOTIDINE 20 MG/1
TABLET, FILM COATED ORAL
Status: DISPENSED
Start: 2025-06-02

## (undated) RX ORDER — PROTAMINE SULFATE 10 MG/ML
INJECTION, SOLUTION INTRAVENOUS
Status: DISPENSED

## (undated) RX ORDER — CEFAZOLIN SODIUM/WATER 2 G/20 ML
SYRINGE (ML) INTRAVENOUS
Status: DISPENSED
Start: 2025-06-02

## (undated) RX ORDER — CALCIUM CHLORIDE 100 MG/ML
INJECTION INTRAVENOUS; INTRAVENTRICULAR
Status: DISPENSED
Start: 2025-06-02

## (undated) RX ORDER — HEPARIN SODIUM 200 [USP'U]/100ML
INJECTION, SOLUTION INTRAVENOUS
Status: DISPENSED
Start: 2025-03-20

## (undated) RX ORDER — LIDOCAINE HYDROCHLORIDE 10 MG/ML
INJECTION, SOLUTION EPIDURAL; INFILTRATION; INTRACAUDAL; PERINEURAL
Status: DISPENSED
Start: 2025-03-20